# Patient Record
Sex: MALE | HISPANIC OR LATINO | Employment: PART TIME | URBAN - METROPOLITAN AREA
[De-identification: names, ages, dates, MRNs, and addresses within clinical notes are randomized per-mention and may not be internally consistent; named-entity substitution may affect disease eponyms.]

---

## 2017-06-27 ENCOUNTER — HOSPITAL ENCOUNTER (OUTPATIENT)
Dept: LAB | Facility: MEDICAL CENTER | Age: 77
End: 2017-06-27
Attending: FAMILY MEDICINE
Payer: COMMERCIAL

## 2017-06-27 LAB
ALBUMIN SERPL BCP-MCNC: 4.1 G/DL (ref 3.2–4.9)
ALBUMIN/GLOB SERPL: 1.3 G/DL
ALP SERPL-CCNC: 80 U/L (ref 30–99)
ALT SERPL-CCNC: 12 U/L (ref 2–50)
ANION GAP SERPL CALC-SCNC: 8 MMOL/L (ref 0–11.9)
AST SERPL-CCNC: 17 U/L (ref 12–45)
BASOPHILS # BLD AUTO: 0.6 % (ref 0–1.8)
BASOPHILS # BLD: 0.04 K/UL (ref 0–0.12)
BILIRUB SERPL-MCNC: 0.8 MG/DL (ref 0.1–1.5)
BUN SERPL-MCNC: 18 MG/DL (ref 8–22)
CALCIUM SERPL-MCNC: 8.9 MG/DL (ref 8.5–10.5)
CHLORIDE SERPL-SCNC: 100 MMOL/L (ref 96–112)
CHOLEST SERPL-MCNC: 151 MG/DL (ref 100–199)
CO2 SERPL-SCNC: 28 MMOL/L (ref 20–33)
CREAT SERPL-MCNC: 1.06 MG/DL (ref 0.5–1.4)
EOSINOPHIL # BLD AUTO: 0.19 K/UL (ref 0–0.51)
EOSINOPHIL NFR BLD: 2.8 % (ref 0–6.9)
ERYTHROCYTE [DISTWIDTH] IN BLOOD BY AUTOMATED COUNT: 45.4 FL (ref 35.9–50)
EST. AVERAGE GLUCOSE BLD GHB EST-MCNC: 105 MG/DL
GFR SERPL CREATININE-BSD FRML MDRD: >60 ML/MIN/1.73 M 2
GLOBULIN SER CALC-MCNC: 3.2 G/DL (ref 1.9–3.5)
GLUCOSE SERPL-MCNC: 95 MG/DL (ref 65–99)
HBA1C MFR BLD: 5.3 % (ref 0–5.6)
HCT VFR BLD AUTO: 46.2 % (ref 42–52)
HDLC SERPL-MCNC: 45 MG/DL
HGB BLD-MCNC: 15.8 G/DL (ref 14–18)
IMM GRANULOCYTES # BLD AUTO: 0.02 K/UL (ref 0–0.11)
IMM GRANULOCYTES NFR BLD AUTO: 0.3 % (ref 0–0.9)
LDLC SERPL CALC-MCNC: 90 MG/DL
LYMPHOCYTES # BLD AUTO: 2.71 K/UL (ref 1–4.8)
LYMPHOCYTES NFR BLD: 39.6 % (ref 22–41)
MCH RBC QN AUTO: 30.9 PG (ref 27–33)
MCHC RBC AUTO-ENTMCNC: 34.2 G/DL (ref 33.7–35.3)
MCV RBC AUTO: 90.2 FL (ref 81.4–97.8)
MONOCYTES # BLD AUTO: 0.59 K/UL (ref 0–0.85)
MONOCYTES NFR BLD AUTO: 8.6 % (ref 0–13.4)
NEUTROPHILS # BLD AUTO: 3.3 K/UL (ref 1.82–7.42)
NEUTROPHILS NFR BLD: 48.1 % (ref 44–72)
NRBC # BLD AUTO: 0 K/UL
NRBC BLD AUTO-RTO: 0 /100 WBC
PLATELET # BLD AUTO: 230 K/UL (ref 164–446)
PMV BLD AUTO: 11.8 FL (ref 9–12.9)
POTASSIUM SERPL-SCNC: 3.4 MMOL/L (ref 3.6–5.5)
PROT SERPL-MCNC: 7.3 G/DL (ref 6–8.2)
PSA SERPL-MCNC: 1.53 NG/ML (ref 0–4)
RBC # BLD AUTO: 5.12 M/UL (ref 4.7–6.1)
SODIUM SERPL-SCNC: 136 MMOL/L (ref 135–145)
T3FREE SERPL-MCNC: 2.7 PG/ML (ref 2.4–4.2)
T4 FREE SERPL-MCNC: 1.11 NG/DL (ref 0.53–1.43)
TRIGL SERPL-MCNC: 81 MG/DL (ref 0–149)
TSH SERPL DL<=0.005 MIU/L-ACNC: 2.26 UIU/ML (ref 0.3–3.7)
WBC # BLD AUTO: 6.9 K/UL (ref 4.8–10.8)

## 2017-06-27 PROCEDURE — 83036 HEMOGLOBIN GLYCOSYLATED A1C: CPT | Mod: GA

## 2017-06-27 PROCEDURE — 84439 ASSAY OF FREE THYROXINE: CPT

## 2017-06-27 PROCEDURE — 85025 COMPLETE CBC W/AUTO DIFF WBC: CPT

## 2017-06-27 PROCEDURE — 80061 LIPID PANEL: CPT

## 2017-06-27 PROCEDURE — 84481 FREE ASSAY (FT-3): CPT

## 2017-06-27 PROCEDURE — 84443 ASSAY THYROID STIM HORMONE: CPT

## 2017-06-27 PROCEDURE — 36415 COLL VENOUS BLD VENIPUNCTURE: CPT

## 2017-06-27 PROCEDURE — 84153 ASSAY OF PSA TOTAL: CPT | Mod: GA

## 2017-06-27 PROCEDURE — 80053 COMPREHEN METABOLIC PANEL: CPT

## 2018-04-30 ENCOUNTER — HOSPITAL ENCOUNTER (OUTPATIENT)
Dept: LAB | Facility: MEDICAL CENTER | Age: 78
End: 2018-04-30
Attending: FAMILY MEDICINE
Payer: COMMERCIAL

## 2018-04-30 LAB
ALBUMIN SERPL BCP-MCNC: 4 G/DL (ref 3.2–4.9)
ALBUMIN/GLOB SERPL: 1.1 G/DL
ALP SERPL-CCNC: 68 U/L (ref 30–99)
ALT SERPL-CCNC: 13 U/L (ref 2–50)
ANION GAP SERPL CALC-SCNC: 12 MMOL/L (ref 0–11.9)
APPEARANCE UR: CLEAR
AST SERPL-CCNC: 20 U/L (ref 12–45)
BASOPHILS # BLD AUTO: 0.8 % (ref 0–1.8)
BASOPHILS # BLD: 0.06 K/UL (ref 0–0.12)
BILIRUB SERPL-MCNC: 0.8 MG/DL (ref 0.1–1.5)
BILIRUB UR QL STRIP.AUTO: NEGATIVE
BNP SERPL-MCNC: 32 PG/ML (ref 0–100)
BUN SERPL-MCNC: 15 MG/DL (ref 8–22)
CALCIUM SERPL-MCNC: 9.1 MG/DL (ref 8.5–10.5)
CHLORIDE SERPL-SCNC: 95 MMOL/L (ref 96–112)
CHOLEST SERPL-MCNC: 151 MG/DL (ref 100–199)
CO2 SERPL-SCNC: 30 MMOL/L (ref 20–33)
COLOR UR: YELLOW
CREAT SERPL-MCNC: 0.88 MG/DL (ref 0.5–1.4)
EOSINOPHIL # BLD AUTO: 0.24 K/UL (ref 0–0.51)
EOSINOPHIL NFR BLD: 3.4 % (ref 0–6.9)
ERYTHROCYTE [DISTWIDTH] IN BLOOD BY AUTOMATED COUNT: 48.2 FL (ref 35.9–50)
EST. AVERAGE GLUCOSE BLD GHB EST-MCNC: 120 MG/DL
GLOBULIN SER CALC-MCNC: 3.6 G/DL (ref 1.9–3.5)
GLUCOSE SERPL-MCNC: 98 MG/DL (ref 65–99)
GLUCOSE UR STRIP.AUTO-MCNC: NEGATIVE MG/DL
HBA1C MFR BLD: 5.8 % (ref 0–5.6)
HCT VFR BLD AUTO: 48.1 % (ref 42–52)
HDLC SERPL-MCNC: 42 MG/DL
HGB BLD-MCNC: 16 G/DL (ref 14–18)
IMM GRANULOCYTES # BLD AUTO: 0.02 K/UL (ref 0–0.11)
IMM GRANULOCYTES NFR BLD AUTO: 0.3 % (ref 0–0.9)
KETONES UR STRIP.AUTO-MCNC: NEGATIVE MG/DL
LDLC SERPL CALC-MCNC: 87 MG/DL
LEUKOCYTE ESTERASE UR QL STRIP.AUTO: NEGATIVE
LYMPHOCYTES # BLD AUTO: 2.61 K/UL (ref 1–4.8)
LYMPHOCYTES NFR BLD: 36.7 % (ref 22–41)
MCH RBC QN AUTO: 30.2 PG (ref 27–33)
MCHC RBC AUTO-ENTMCNC: 33.3 G/DL (ref 33.7–35.3)
MCV RBC AUTO: 90.8 FL (ref 81.4–97.8)
MICRO URNS: NORMAL
MONOCYTES # BLD AUTO: 0.57 K/UL (ref 0–0.85)
MONOCYTES NFR BLD AUTO: 8 % (ref 0–13.4)
NEUTROPHILS # BLD AUTO: 3.62 K/UL (ref 1.82–7.42)
NEUTROPHILS NFR BLD: 50.8 % (ref 44–72)
NITRITE UR QL STRIP.AUTO: NEGATIVE
NRBC # BLD AUTO: 0 K/UL
NRBC BLD-RTO: 0 /100 WBC
PH UR STRIP.AUTO: 7.5 [PH]
PLATELET # BLD AUTO: 259 K/UL (ref 164–446)
PMV BLD AUTO: 11.4 FL (ref 9–12.9)
POTASSIUM SERPL-SCNC: 3.1 MMOL/L (ref 3.6–5.5)
PROT SERPL-MCNC: 7.6 G/DL (ref 6–8.2)
PROT UR QL STRIP: NEGATIVE MG/DL
PSA SERPL-MCNC: 1.89 NG/ML (ref 0–4)
RBC # BLD AUTO: 5.3 M/UL (ref 4.7–6.1)
RBC UR QL AUTO: NEGATIVE
SODIUM SERPL-SCNC: 137 MMOL/L (ref 135–145)
SP GR UR STRIP.AUTO: 1.01
TRIGL SERPL-MCNC: 108 MG/DL (ref 0–149)
UROBILINOGEN UR STRIP.AUTO-MCNC: 0.2 MG/DL
WBC # BLD AUTO: 7.1 K/UL (ref 4.8–10.8)

## 2018-04-30 PROCEDURE — 80053 COMPREHEN METABOLIC PANEL: CPT

## 2018-04-30 PROCEDURE — 85025 COMPLETE CBC W/AUTO DIFF WBC: CPT

## 2018-04-30 PROCEDURE — 80061 LIPID PANEL: CPT

## 2018-04-30 PROCEDURE — 84153 ASSAY OF PSA TOTAL: CPT | Mod: GA

## 2018-04-30 PROCEDURE — 83880 ASSAY OF NATRIURETIC PEPTIDE: CPT | Mod: GA

## 2018-04-30 PROCEDURE — 36415 COLL VENOUS BLD VENIPUNCTURE: CPT | Mod: GA

## 2018-04-30 PROCEDURE — 83036 HEMOGLOBIN GLYCOSYLATED A1C: CPT | Mod: GA

## 2018-04-30 PROCEDURE — 81003 URINALYSIS AUTO W/O SCOPE: CPT

## 2018-11-29 ENCOUNTER — HOSPITAL ENCOUNTER (OUTPATIENT)
Dept: LAB | Facility: MEDICAL CENTER | Age: 78
End: 2018-11-29
Attending: FAMILY MEDICINE
Payer: MEDICARE

## 2018-11-29 LAB
ALBUMIN SERPL BCP-MCNC: 4.1 G/DL (ref 3.2–4.9)
ALBUMIN/GLOB SERPL: 1.2 G/DL
ALP SERPL-CCNC: 80 U/L (ref 30–99)
ALT SERPL-CCNC: 17 U/L (ref 2–50)
ANION GAP SERPL CALC-SCNC: 10 MMOL/L (ref 0–11.9)
AST SERPL-CCNC: 22 U/L (ref 12–45)
BILIRUB SERPL-MCNC: 0.6 MG/DL (ref 0.1–1.5)
BUN SERPL-MCNC: 16 MG/DL (ref 8–22)
CALCIUM SERPL-MCNC: 9.2 MG/DL (ref 8.5–10.5)
CHLORIDE SERPL-SCNC: 99 MMOL/L (ref 96–112)
CO2 SERPL-SCNC: 27 MMOL/L (ref 20–33)
CREAT SERPL-MCNC: 1.02 MG/DL (ref 0.5–1.4)
EST. AVERAGE GLUCOSE BLD GHB EST-MCNC: 117 MG/DL
GLOBULIN SER CALC-MCNC: 3.5 G/DL (ref 1.9–3.5)
GLUCOSE SERPL-MCNC: 97 MG/DL (ref 65–99)
HBA1C MFR BLD: 5.7 % (ref 0–5.6)
POTASSIUM SERPL-SCNC: 3.1 MMOL/L (ref 3.6–5.5)
PROT SERPL-MCNC: 7.6 G/DL (ref 6–8.2)
SODIUM SERPL-SCNC: 136 MMOL/L (ref 135–145)

## 2018-11-29 PROCEDURE — 36415 COLL VENOUS BLD VENIPUNCTURE: CPT

## 2018-11-29 PROCEDURE — 80053 COMPREHEN METABOLIC PANEL: CPT

## 2018-11-29 PROCEDURE — 83036 HEMOGLOBIN GLYCOSYLATED A1C: CPT | Mod: GA

## 2021-01-14 DIAGNOSIS — Z23 NEED FOR VACCINATION: ICD-10-CM

## 2021-03-31 ENCOUNTER — IMMUNIZATION (OUTPATIENT)
Dept: FAMILY PLANNING/WOMEN'S HEALTH CLINIC | Facility: IMMUNIZATION CENTER | Age: 81
End: 2021-03-31
Attending: INTERNAL MEDICINE
Payer: MEDICARE

## 2021-03-31 DIAGNOSIS — Z23 ENCOUNTER FOR VACCINATION: Primary | ICD-10-CM

## 2021-03-31 DIAGNOSIS — Z23 NEED FOR VACCINATION: ICD-10-CM

## 2021-03-31 PROCEDURE — 0001A PFIZER SARS-COV-2 VACCINE: CPT | Performed by: INTERNAL MEDICINE

## 2021-03-31 PROCEDURE — 91300 PFIZER SARS-COV-2 VACCINE: CPT | Performed by: INTERNAL MEDICINE

## 2021-04-22 ENCOUNTER — PATIENT OUTREACH (OUTPATIENT)
Dept: SCHEDULING | Facility: IMAGING CENTER | Age: 81
End: 2021-04-22

## 2021-04-22 ENCOUNTER — IMMUNIZATION (OUTPATIENT)
Dept: FAMILY PLANNING/WOMEN'S HEALTH CLINIC | Facility: IMMUNIZATION CENTER | Age: 81
End: 2021-04-22
Payer: MEDICARE

## 2021-04-22 DIAGNOSIS — Z23 ENCOUNTER FOR VACCINATION: Primary | ICD-10-CM

## 2021-04-22 PROCEDURE — 91300 PFIZER SARS-COV-2 VACCINE: CPT | Performed by: INTERNAL MEDICINE

## 2021-04-22 PROCEDURE — 0002A PFIZER SARS-COV-2 VACCINE: CPT | Performed by: INTERNAL MEDICINE

## 2021-04-22 NOTE — PROGRESS NOTES
Attempt #1    Outreach for COVID vaccine    [facility] already had 2nd dose    Transfer to [phone] if patient calls back to schedule appointment.

## 2022-10-18 ENCOUNTER — PHARMACY VISIT (OUTPATIENT)
Dept: PHARMACY | Facility: MEDICAL CENTER | Age: 82
End: 2022-10-18
Payer: COMMERCIAL

## 2022-10-18 PROCEDURE — RXMED WILLOW AMBULATORY MEDICATION CHARGE: Performed by: INTERNAL MEDICINE

## 2022-10-18 RX ORDER — BNT162B2 ORIGINAL AND OMICRON BA.4/BA.5 .1125; .1125 MG/2.25ML; MG/2.25ML
0.3 INJECTION, SUSPENSION INTRAMUSCULAR
Qty: 0.3 ML | Refills: 0 | OUTPATIENT
Start: 2022-10-18 | End: 2022-10-19

## 2023-11-10 ENCOUNTER — APPOINTMENT (OUTPATIENT)
Dept: RADIOLOGY | Facility: MEDICAL CENTER | Age: 83
End: 2023-11-10
Attending: EMERGENCY MEDICINE
Payer: MEDICARE

## 2023-11-10 ENCOUNTER — HOSPITAL ENCOUNTER (EMERGENCY)
Facility: MEDICAL CENTER | Age: 83
End: 2023-11-10
Attending: EMERGENCY MEDICINE
Payer: MEDICARE

## 2023-11-10 VITALS
OXYGEN SATURATION: 95 % | BODY MASS INDEX: 26.98 KG/M2 | TEMPERATURE: 98.6 F | SYSTOLIC BLOOD PRESSURE: 168 MMHG | HEART RATE: 58 BPM | HEIGHT: 68 IN | WEIGHT: 178 LBS | RESPIRATION RATE: 16 BRPM | DIASTOLIC BLOOD PRESSURE: 68 MMHG
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
## 2023-11-10 DIAGNOSIS — M79.605 PAIN OF LEFT LOWER EXTREMITY: ICD-10-CM

## 2023-11-10 LAB
ALBUMIN SERPL BCP-MCNC: 4.3 G/DL (ref 3.2–4.9)
ALBUMIN/GLOB SERPL: 1.1 G/DL
ALP SERPL-CCNC: 75 U/L (ref 30–99)
ALT SERPL-CCNC: 24 U/L (ref 2–50)
ANION GAP SERPL CALC-SCNC: 13 MMOL/L (ref 7–16)
AST SERPL-CCNC: 24 U/L (ref 12–45)
BASOPHILS # BLD AUTO: 0.5 % (ref 0–1.8)
BASOPHILS # BLD: 0.05 K/UL (ref 0–0.12)
BILIRUB SERPL-MCNC: 0.7 MG/DL (ref 0.1–1.5)
BUN SERPL-MCNC: 13 MG/DL (ref 8–22)
CALCIUM ALBUM COR SERPL-MCNC: 9.2 MG/DL (ref 8.5–10.5)
CALCIUM SERPL-MCNC: 9.4 MG/DL (ref 8.5–10.5)
CHLORIDE SERPL-SCNC: 100 MMOL/L (ref 96–112)
CO2 SERPL-SCNC: 23 MMOL/L (ref 20–33)
CREAT SERPL-MCNC: 0.88 MG/DL (ref 0.5–1.4)
EOSINOPHIL # BLD AUTO: 0.13 K/UL (ref 0–0.51)
EOSINOPHIL NFR BLD: 1.4 % (ref 0–6.9)
ERYTHROCYTE [DISTWIDTH] IN BLOOD BY AUTOMATED COUNT: 49.1 FL (ref 35.9–50)
GFR SERPLBLD CREATININE-BSD FMLA CKD-EPI: 84 ML/MIN/1.73 M 2
GLOBULIN SER CALC-MCNC: 3.8 G/DL (ref 1.9–3.5)
GLUCOSE SERPL-MCNC: 92 MG/DL (ref 65–99)
HCT VFR BLD AUTO: 50.1 % (ref 42–52)
HGB BLD-MCNC: 17.1 G/DL (ref 14–18)
IMM GRANULOCYTES # BLD AUTO: 0.04 K/UL (ref 0–0.11)
IMM GRANULOCYTES NFR BLD AUTO: 0.4 % (ref 0–0.9)
LYMPHOCYTES # BLD AUTO: 2.64 K/UL (ref 1–4.8)
LYMPHOCYTES NFR BLD: 28.5 % (ref 22–41)
MCH RBC QN AUTO: 30.8 PG (ref 27–33)
MCHC RBC AUTO-ENTMCNC: 34.1 G/DL (ref 32.3–36.5)
MCV RBC AUTO: 90.3 FL (ref 81.4–97.8)
MONOCYTES # BLD AUTO: 0.66 K/UL (ref 0–0.85)
MONOCYTES NFR BLD AUTO: 7.1 % (ref 0–13.4)
NEUTROPHILS # BLD AUTO: 5.74 K/UL (ref 1.82–7.42)
NEUTROPHILS NFR BLD: 62.1 % (ref 44–72)
NRBC # BLD AUTO: 0 K/UL
NRBC BLD-RTO: 0 /100 WBC (ref 0–0.2)
PLATELET # BLD AUTO: 251 K/UL (ref 164–446)
PMV BLD AUTO: 10.6 FL (ref 9–12.9)
POTASSIUM SERPL-SCNC: 3.8 MMOL/L (ref 3.6–5.5)
PROT SERPL-MCNC: 8.1 G/DL (ref 6–8.2)
RBC # BLD AUTO: 5.55 M/UL (ref 4.7–6.1)
SODIUM SERPL-SCNC: 136 MMOL/L (ref 135–145)
WBC # BLD AUTO: 9.3 K/UL (ref 4.8–10.8)

## 2023-11-10 PROCEDURE — 96374 THER/PROPH/DIAG INJ IV PUSH: CPT

## 2023-11-10 PROCEDURE — 36415 COLL VENOUS BLD VENIPUNCTURE: CPT

## 2023-11-10 PROCEDURE — 700111 HCHG RX REV CODE 636 W/ 250 OVERRIDE (IP): Mod: JZ | Performed by: EMERGENCY MEDICINE

## 2023-11-10 PROCEDURE — 80053 COMPREHEN METABOLIC PANEL: CPT

## 2023-11-10 PROCEDURE — 85025 COMPLETE CBC W/AUTO DIFF WBC: CPT

## 2023-11-10 PROCEDURE — 96375 TX/PRO/DX INJ NEW DRUG ADDON: CPT

## 2023-11-10 PROCEDURE — 73552 X-RAY EXAM OF FEMUR 2/>: CPT | Mod: LT

## 2023-11-10 PROCEDURE — 94760 N-INVAS EAR/PLS OXIMETRY 1: CPT

## 2023-11-10 PROCEDURE — 99285 EMERGENCY DEPT VISIT HI MDM: CPT

## 2023-11-10 PROCEDURE — 72170 X-RAY EXAM OF PELVIS: CPT

## 2023-11-10 RX ORDER — ONDANSETRON 2 MG/ML
4 INJECTION INTRAMUSCULAR; INTRAVENOUS ONCE
Status: COMPLETED | OUTPATIENT
Start: 2023-11-10 | End: 2023-11-10

## 2023-11-10 RX ORDER — HYDROMORPHONE HYDROCHLORIDE 1 MG/ML
0.5 INJECTION, SOLUTION INTRAMUSCULAR; INTRAVENOUS; SUBCUTANEOUS
Status: DISCONTINUED | OUTPATIENT
Start: 2023-11-10 | End: 2023-11-10 | Stop reason: HOSPADM

## 2023-11-10 RX ADMIN — ONDANSETRON 4 MG: 2 INJECTION INTRAMUSCULAR; INTRAVENOUS at 14:25

## 2023-11-10 RX ADMIN — HYDROMORPHONE HYDROCHLORIDE 0.5 MG: 1 INJECTION, SOLUTION INTRAMUSCULAR; INTRAVENOUS; SUBCUTANEOUS at 14:24

## 2023-11-10 NOTE — ED PROVIDER NOTES
"ER Provider Note    Scribed for Joseluis Norton M.D. by Femi Avilez. 11/10/2023   1:53 PM    Primary Care Provider: Susi Gregorio M.D.    CHIEF COMPLAINT  Chief Complaint   Patient presents with    Leg Pain     The pt fell off of the 3rd step and landed on left leg. The pt reports left leg pain right above the knee. The pt denies head injury or any other trauma. No loc, no thinners.    Fall     EXTERNAL RECORDS REVIEWED  Other No recent pertinent records available for review.    HPI/ROS  LIMITATION TO HISTORY   Select: : None    OUTSIDE HISTORIAN(S):  None    Victoriano Vasquez is a 83 y.o. male who presents to the ED via EMS for evaluation of left leg pain after a ground level fall, onset prior to arrival. The patient states he was walking on the stairs in his house when he fell off the third step and landed on his left leg. He notes the pain is most significant right above the knee. He denies any loss of consciousness, head injury, chest pain, or shortness of breath. There are no known alleviating factors. He is not currently on blood thinners and reports no major past medical history.    PAST MEDICAL HISTORY  History reviewed. No pertinent past medical history.    SURGICAL HISTORY  History reviewed. No pertinent surgical history.    FAMILY HISTORY  History reviewed. No pertinent family history.    SOCIAL HISTORY   reports that he has never smoked. He has never used smokeless tobacco. He reports that he does not drink alcohol and does not use drugs.    CURRENT MEDICATIONS  Previous Medications    NON SPECIFIED    Medication for high cholersterol        ALLERGIES  No Known Allergies     PHYSICAL EXAM  BP (!) 180/81   Pulse 71   Resp 18   Ht 1.727 m (5' 8\")   Wt 80.7 kg (178 lb)   SpO2 98%   BMI 27.06 kg/m²    Constitutional: Well developed, Well nourished, Moderate distress,    HENT: Normocephalic, Atraumatic   Eyes: PERRLA, EOMI, Conjunctiva normal, No discharge.   Neck: No C-spine tenderness, Supple, " No stridor.   Cardiovascular: Normal heart rate, Normal rhythm.   Thorax & Lungs: Clear to auscultation bilaterally, No respiratory distress, No wheezing, No crackles.   Abdomen: Soft, No tenderness, No masses, No pulsatile masses.   Skin: Warm, Dry, No erythema, No rash.    Musculoskeletal: Tenderness around the left mid femur without any obvious deformity. No major deformities noted.  Intact distal pulses  Neurologic: Awake, alert. Moves all extremities spontaneously.  Psychiatric: Affect normal, Judgment normal, Mood normal.       DIAGNOSTIC STUDIES    Labs:   Results for orders placed or performed during the hospital encounter of 11/10/23   CBC w/ Differential   Result Value Ref Range    WBC 9.3 4.8 - 10.8 K/uL    RBC 5.55 4.70 - 6.10 M/uL    Hemoglobin 17.1 14.0 - 18.0 g/dL    Hematocrit 50.1 42.0 - 52.0 %    MCV 90.3 81.4 - 97.8 fL    MCH 30.8 27.0 - 33.0 pg    MCHC 34.1 32.3 - 36.5 g/dL    RDW 49.1 35.9 - 50.0 fL    Platelet Count 251 164 - 446 K/uL    MPV 10.6 9.0 - 12.9 fL    Neutrophils-Polys 62.10 44.00 - 72.00 %    Lymphocytes 28.50 22.00 - 41.00 %    Monocytes 7.10 0.00 - 13.40 %    Eosinophils 1.40 0.00 - 6.90 %    Basophils 0.50 0.00 - 1.80 %    Immature Granulocytes 0.40 0.00 - 0.90 %    Nucleated RBC 0.00 0.00 - 0.20 /100 WBC    Neutrophils (Absolute) 5.74 1.82 - 7.42 K/uL    Lymphs (Absolute) 2.64 1.00 - 4.80 K/uL    Monos (Absolute) 0.66 0.00 - 0.85 K/uL    Eos (Absolute) 0.13 0.00 - 0.51 K/uL    Baso (Absolute) 0.05 0.00 - 0.12 K/uL    Immature Granulocytes (abs) 0.04 0.00 - 0.11 K/uL    NRBC (Absolute) 0.00 K/uL   Complete Metabolic Panel (CMP)   Result Value Ref Range    Sodium 136 135 - 145 mmol/L    Potassium 3.8 3.6 - 5.5 mmol/L    Chloride 100 96 - 112 mmol/L    Co2 23 20 - 33 mmol/L    Anion Gap 13.0 7.0 - 16.0    Glucose 92 65 - 99 mg/dL    Bun 13 8 - 22 mg/dL    Creatinine 0.88 0.50 - 1.40 mg/dL    Calcium 9.4 8.5 - 10.5 mg/dL    Correct Calcium 9.2 8.5 - 10.5 mg/dL    AST(SGOT) 24 12 -  45 U/L    ALT(SGPT) 24 2 - 50 U/L    Alkaline Phosphatase 75 30 - 99 U/L    Total Bilirubin 0.7 0.1 - 1.5 mg/dL    Albumin 4.3 3.2 - 4.9 g/dL    Total Protein 8.1 6.0 - 8.2 g/dL    Globulin 3.8 (H) 1.9 - 3.5 g/dL    A-G Ratio 1.1 g/dL   ESTIMATED GFR   Result Value Ref Range    GFR (CKD-EPI) 84 >60 mL/min/1.73 m 2       Radiology:   This attending emergency physician has independently interpreted the diagnostic imaging associated with this visit and is awaiting the final reading from the radiologist.   Preliminary interpretation is a follows: No fracture  Radiologist interpretation:   DX-FEMUR-2+ LEFT   Final Result      Negative femur series.      DX-PELVIS-1 OR 2 VIEWS   Final Result      1.  Unremarkable AP view of the pelvis.             COURSE & MEDICAL DECISION MAKING     ED Observation Status?  No  INITIAL ASSESSMENT, COURSE AND PLAN    Care Narrative: Status post fall, leg pain, x-rays negative, patient was able to ambulate with a walker.  We will give the patient a walker, will have the patient follow-up with orthopedics, have the patient return with worsening symptoms.    1:53 PM - Patient was evaluated at bedside. Ordered for DX-Pelvis, DX-Femur 2+ left, CBC with differential, and CMP to evaluate. The patient will be medicated with Dilaudid injection 0.5 mg and Zofran injection 4 mg for his pain and nausea. Patient verbalizes understanding and support with my plan of care.         DISPOSITION AND DISCUSSIONS      Discussion of management with other [facility] or appropriate source(s): Social Work        Barriers to care at this time, including but not limited to:  None known at this time .     Decision tools and prescription drugs considered including, but not limited to: Pain Medications   .     The patient will return for new or worsening symptoms and is stable at the time of discharge.    The patient is referred to a primary physician for blood pressure management, diabetic screening, and for all other  preventative health concerns.        DISPOSITION:  Patient will be discharged home in stable condition.    FOLLOW UP:  [facility], Emergency Dept  [address] [facility] [address] [phone]    If symptoms worsen    Ramon Ma M.D.  [address] [place] [id number] [phone]            OUTPATIENT MEDICATIONS:  New Prescriptions    No medications on file         FINAL DIAGNOSIS  1. Fall, initial encounter    2. Pain of left lower extremity         IFemi (Scribe), am scribing for, and in the presence of, Joseluis Norton M.D..    Electronically signed by: Femi Avilez (Lennieibmarvel), 11/10/2023    IJoseluis M.D. personally performed the services described in this documentation, as scribed by Femi Avilez in my presence, and it is both accurate and complete.      The note accurately reflects work and decisions made by me.  Joseluis Norton M.D.  11/10/2023  4:31 PM

## 2023-11-10 NOTE — ED NOTES
The pt is alert and oriented. Vitals stable on the monitor. The pt reports pain, RN to medicate per mar.

## 2023-11-10 NOTE — ED NOTES
The pt is alert and oriented sitting in bed. The pt reports a pain reduction after medicating per mar. Vitals stable on the monitor. The pt ambulates with a walker down the henry with RN at standby assist. ERP notified

## 2023-11-10 NOTE — ED NOTES
The pt is alert and oriented sitting in bed. The pt reports leg pain. RN hooked up the pt on the monitor, vitals stable on the monitor.

## 2023-11-10 NOTE — ED TRIAGE NOTES
"Chief Complaint   Patient presents with    Leg Pain     The pt fell off of the 3rd step and landed on left leg. The pt reports left leg pain right above the knee. The pt denies head injury or any other trauma. No loc, no thinners.    Fall       BIB EMS to 16, pt on monitor and in gown, labs drawn and sent. Pt consists of: for the above complaint.    Medications given en route: nitrous oxide    BP (!) 180/81   Pulse 71   Resp 18   Ht 1.727 m (5' 8\")   Wt 80.7 kg (178 lb)   SpO2 98%   BMI 27.06 kg/m²     "

## 2024-04-26 ENCOUNTER — APPOINTMENT (OUTPATIENT)
Dept: RADIOLOGY | Facility: MEDICAL CENTER | Age: 84
DRG: 419 | End: 2024-04-26
Attending: EMERGENCY MEDICINE
Payer: MEDICARE

## 2024-04-26 ENCOUNTER — APPOINTMENT (OUTPATIENT)
Dept: RADIOLOGY | Facility: MEDICAL CENTER | Age: 84
DRG: 419 | End: 2024-04-26
Attending: STUDENT IN AN ORGANIZED HEALTH CARE EDUCATION/TRAINING PROGRAM
Payer: MEDICARE

## 2024-04-26 ENCOUNTER — HOSPITAL ENCOUNTER (INPATIENT)
Facility: MEDICAL CENTER | Age: 84
LOS: 4 days | DRG: 419 | End: 2024-04-30
Attending: EMERGENCY MEDICINE | Admitting: STUDENT IN AN ORGANIZED HEALTH CARE EDUCATION/TRAINING PROGRAM
Payer: MEDICARE

## 2024-04-26 DIAGNOSIS — I48.0 PAROXYSMAL ATRIAL FIBRILLATION (HCC): ICD-10-CM

## 2024-04-26 DIAGNOSIS — I35.0 AORTIC STENOSIS, MODERATE: ICD-10-CM

## 2024-04-26 DIAGNOSIS — K81.0 ACUTE CHOLECYSTITIS: ICD-10-CM

## 2024-04-26 DIAGNOSIS — R10.13 EPIGASTRIC PAIN: ICD-10-CM

## 2024-04-26 DIAGNOSIS — K83.8 DILATION OF BILIARY TRACT: ICD-10-CM

## 2024-04-26 DIAGNOSIS — I10 PRIMARY HYPERTENSION: ICD-10-CM

## 2024-04-26 DIAGNOSIS — E78.5 HYPERLIPIDEMIA, UNSPECIFIED HYPERLIPIDEMIA TYPE: ICD-10-CM

## 2024-04-26 PROBLEM — E87.6 HYPOKALEMIA: Status: ACTIVE | Noted: 2024-04-26

## 2024-04-26 PROBLEM — R74.8 ELEVATED LIVER ENZYMES: Status: ACTIVE | Noted: 2024-04-26

## 2024-04-26 PROBLEM — E03.9 HYPOTHYROIDISM: Status: ACTIVE | Noted: 2024-04-26

## 2024-04-26 PROBLEM — Z71.89 ACP (ADVANCE CARE PLANNING): Status: ACTIVE | Noted: 2024-04-26

## 2024-04-26 PROBLEM — I48.91 AF (ATRIAL FIBRILLATION) (HCC): Status: ACTIVE | Noted: 2024-04-26

## 2024-04-26 PROBLEM — R10.9 ABDOMINAL PAIN: Status: ACTIVE | Noted: 2024-04-26

## 2024-04-26 LAB
ALBUMIN SERPL BCP-MCNC: 4.2 G/DL (ref 3.2–4.9)
ALBUMIN/GLOB SERPL: 1.1 G/DL
ALP SERPL-CCNC: 121 U/L (ref 30–99)
ALT SERPL-CCNC: 49 U/L (ref 2–50)
ANION GAP SERPL CALC-SCNC: 14 MMOL/L (ref 7–16)
APPEARANCE UR: CLEAR
AST SERPL-CCNC: 93 U/L (ref 12–45)
BACTERIA #/AREA URNS HPF: NEGATIVE /HPF
BASOPHILS # BLD AUTO: 0.2 % (ref 0–1.8)
BASOPHILS # BLD: 0.02 K/UL (ref 0–0.12)
BILIRUB SERPL-MCNC: 1.3 MG/DL (ref 0.1–1.5)
BILIRUB UR QL STRIP.AUTO: NEGATIVE
BUN SERPL-MCNC: 19 MG/DL (ref 8–22)
CALCIUM ALBUM COR SERPL-MCNC: 9.2 MG/DL (ref 8.5–10.5)
CALCIUM SERPL-MCNC: 9.4 MG/DL (ref 8.5–10.5)
CHLORIDE SERPL-SCNC: 103 MMOL/L (ref 96–112)
CO2 SERPL-SCNC: 20 MMOL/L (ref 20–33)
COLOR UR: YELLOW
CREAT SERPL-MCNC: 0.83 MG/DL (ref 0.5–1.4)
EKG IMPRESSION: NORMAL
EOSINOPHIL # BLD AUTO: 0.07 K/UL (ref 0–0.51)
EOSINOPHIL NFR BLD: 0.8 % (ref 0–6.9)
EPI CELLS #/AREA URNS HPF: NEGATIVE /HPF
ERYTHROCYTE [DISTWIDTH] IN BLOOD BY AUTOMATED COUNT: 53.7 FL (ref 35.9–50)
GFR SERPLBLD CREATININE-BSD FMLA CKD-EPI: 85 ML/MIN/1.73 M 2
GLOBULIN SER CALC-MCNC: 3.7 G/DL (ref 1.9–3.5)
GLUCOSE SERPL-MCNC: 114 MG/DL (ref 65–99)
GLUCOSE UR STRIP.AUTO-MCNC: NEGATIVE MG/DL
HCT VFR BLD AUTO: 49.1 % (ref 42–52)
HGB BLD-MCNC: 16.4 G/DL (ref 14–18)
HYALINE CASTS #/AREA URNS LPF: ABNORMAL /LPF
IMM GRANULOCYTES # BLD AUTO: 0.02 K/UL (ref 0–0.11)
IMM GRANULOCYTES NFR BLD AUTO: 0.2 % (ref 0–0.9)
KETONES UR STRIP.AUTO-MCNC: 15 MG/DL
LEUKOCYTE ESTERASE UR QL STRIP.AUTO: NEGATIVE
LIPASE SERPL-CCNC: 52 U/L (ref 11–82)
LYMPHOCYTES # BLD AUTO: 2.65 K/UL (ref 1–4.8)
LYMPHOCYTES NFR BLD: 30.3 % (ref 22–41)
MCH RBC QN AUTO: 30.4 PG (ref 27–33)
MCHC RBC AUTO-ENTMCNC: 33.4 G/DL (ref 32.3–36.5)
MCV RBC AUTO: 91.1 FL (ref 81.4–97.8)
MICRO URNS: ABNORMAL
MONOCYTES # BLD AUTO: 0.42 K/UL (ref 0–0.85)
MONOCYTES NFR BLD AUTO: 4.8 % (ref 0–13.4)
NEUTROPHILS # BLD AUTO: 5.58 K/UL (ref 1.82–7.42)
NEUTROPHILS NFR BLD: 63.7 % (ref 44–72)
NITRITE UR QL STRIP.AUTO: NEGATIVE
NRBC # BLD AUTO: 0 K/UL
NRBC BLD-RTO: 0 /100 WBC (ref 0–0.2)
PH UR STRIP.AUTO: 6 [PH] (ref 5–8)
PLATELET # BLD AUTO: 259 K/UL (ref 164–446)
PMV BLD AUTO: 10.8 FL (ref 9–12.9)
POTASSIUM SERPL-SCNC: 3.5 MMOL/L (ref 3.6–5.5)
PROT SERPL-MCNC: 7.9 G/DL (ref 6–8.2)
PROT UR QL STRIP: NEGATIVE MG/DL
RBC # BLD AUTO: 5.39 M/UL (ref 4.7–6.1)
RBC # URNS HPF: ABNORMAL /HPF
RBC UR QL AUTO: ABNORMAL
SODIUM SERPL-SCNC: 137 MMOL/L (ref 135–145)
SP GR UR STRIP.AUTO: 1.02
TROPONIN T SERPL-MCNC: 21 NG/L (ref 6–19)
TROPONIN T SERPL-MCNC: 25 NG/L (ref 6–19)
UROBILINOGEN UR STRIP.AUTO-MCNC: 1 MG/DL
WBC # BLD AUTO: 8.8 K/UL (ref 4.8–10.8)
WBC #/AREA URNS HPF: ABNORMAL /HPF

## 2024-04-26 PROCEDURE — 81001 URINALYSIS AUTO W/SCOPE: CPT

## 2024-04-26 PROCEDURE — 84484 ASSAY OF TROPONIN QUANT: CPT | Mod: 91

## 2024-04-26 PROCEDURE — 99285 EMERGENCY DEPT VISIT HI MDM: CPT

## 2024-04-26 PROCEDURE — 85025 COMPLETE CBC W/AUTO DIFF WBC: CPT

## 2024-04-26 PROCEDURE — 700105 HCHG RX REV CODE 258: Performed by: EMERGENCY MEDICINE

## 2024-04-26 PROCEDURE — 700102 HCHG RX REV CODE 250 W/ 637 OVERRIDE(OP): Performed by: STUDENT IN AN ORGANIZED HEALTH CARE EDUCATION/TRAINING PROGRAM

## 2024-04-26 PROCEDURE — 99223 1ST HOSP IP/OBS HIGH 75: CPT | Mod: 25,AI | Performed by: STUDENT IN AN ORGANIZED HEALTH CARE EDUCATION/TRAINING PROGRAM

## 2024-04-26 PROCEDURE — 770006 HCHG ROOM/CARE - MED/SURG/GYN SEMI*

## 2024-04-26 PROCEDURE — 36415 COLL VENOUS BLD VENIPUNCTURE: CPT

## 2024-04-26 PROCEDURE — 83690 ASSAY OF LIPASE: CPT

## 2024-04-26 PROCEDURE — 96374 THER/PROPH/DIAG INJ IV PUSH: CPT

## 2024-04-26 PROCEDURE — 80053 COMPREHEN METABOLIC PANEL: CPT

## 2024-04-26 PROCEDURE — 99497 ADVNCD CARE PLAN 30 MIN: CPT | Performed by: STUDENT IN AN ORGANIZED HEALTH CARE EDUCATION/TRAINING PROGRAM

## 2024-04-26 PROCEDURE — 700111 HCHG RX REV CODE 636 W/ 250 OVERRIDE (IP): Mod: JZ,JG | Performed by: EMERGENCY MEDICINE

## 2024-04-26 PROCEDURE — 700117 HCHG RX CONTRAST REV CODE 255: Performed by: EMERGENCY MEDICINE

## 2024-04-26 PROCEDURE — 93005 ELECTROCARDIOGRAM TRACING: CPT | Performed by: EMERGENCY MEDICINE

## 2024-04-26 PROCEDURE — A9270 NON-COVERED ITEM OR SERVICE: HCPCS | Performed by: STUDENT IN AN ORGANIZED HEALTH CARE EDUCATION/TRAINING PROGRAM

## 2024-04-26 RX ORDER — LEVOTHYROXINE SODIUM 0.1 MG/1
100 TABLET ORAL
Status: DISCONTINUED | OUTPATIENT
Start: 2024-04-26 | End: 2024-04-30 | Stop reason: HOSPADM

## 2024-04-26 RX ORDER — LEVOTHYROXINE SODIUM 0.1 MG/1
100 TABLET ORAL
COMMUNITY

## 2024-04-26 RX ORDER — ACETAMINOPHEN 325 MG/1
650 TABLET ORAL EVERY 6 HOURS PRN
Status: DISCONTINUED | OUTPATIENT
Start: 2024-04-26 | End: 2024-04-30 | Stop reason: HOSPADM

## 2024-04-26 RX ORDER — ACETAMINOPHEN 10 MG/ML
1000 INJECTION, SOLUTION INTRAVENOUS ONCE
Status: COMPLETED | OUTPATIENT
Start: 2024-04-26 | End: 2024-04-26

## 2024-04-26 RX ORDER — METOPROLOL SUCCINATE 25 MG/1
25 TABLET, EXTENDED RELEASE ORAL 2 TIMES DAILY
Status: DISCONTINUED | OUTPATIENT
Start: 2024-04-26 | End: 2024-04-30 | Stop reason: HOSPADM

## 2024-04-26 RX ORDER — POTASSIUM CHLORIDE 20 MEQ/1
40 TABLET, EXTENDED RELEASE ORAL ONCE
Status: COMPLETED | OUTPATIENT
Start: 2024-04-26 | End: 2024-04-26

## 2024-04-26 RX ORDER — SODIUM CHLORIDE, SODIUM LACTATE, POTASSIUM CHLORIDE, CALCIUM CHLORIDE 600; 310; 30; 20 MG/100ML; MG/100ML; MG/100ML; MG/100ML
1000 INJECTION, SOLUTION INTRAVENOUS ONCE
Status: COMPLETED | OUTPATIENT
Start: 2024-04-26 | End: 2024-04-26

## 2024-04-26 RX ORDER — METOPROLOL SUCCINATE 25 MG/1
25 TABLET, EXTENDED RELEASE ORAL 2 TIMES DAILY
COMMUNITY

## 2024-04-26 RX ORDER — ONDANSETRON 4 MG/1
4 TABLET, ORALLY DISINTEGRATING ORAL EVERY 4 HOURS PRN
Status: DISCONTINUED | OUTPATIENT
Start: 2024-04-26 | End: 2024-04-30 | Stop reason: HOSPADM

## 2024-04-26 RX ORDER — HYDROMORPHONE HYDROCHLORIDE 1 MG/ML
0.5 INJECTION, SOLUTION INTRAMUSCULAR; INTRAVENOUS; SUBCUTANEOUS
Status: DISCONTINUED | OUTPATIENT
Start: 2024-04-26 | End: 2024-04-30 | Stop reason: HOSPADM

## 2024-04-26 RX ORDER — AMOXICILLIN 250 MG
2 CAPSULE ORAL EVERY EVENING
Status: DISCONTINUED | OUTPATIENT
Start: 2024-04-26 | End: 2024-04-30 | Stop reason: HOSPADM

## 2024-04-26 RX ORDER — OXYCODONE HYDROCHLORIDE 10 MG/1
10 TABLET ORAL
Status: DISCONTINUED | OUTPATIENT
Start: 2024-04-26 | End: 2024-04-30 | Stop reason: HOSPADM

## 2024-04-26 RX ORDER — LABETALOL HYDROCHLORIDE 5 MG/ML
10 INJECTION, SOLUTION INTRAVENOUS EVERY 4 HOURS PRN
Status: DISCONTINUED | OUTPATIENT
Start: 2024-04-26 | End: 2024-04-30 | Stop reason: HOSPADM

## 2024-04-26 RX ORDER — AMLODIPINE BESYLATE 5 MG/1
5 TABLET ORAL DAILY
Status: DISCONTINUED | OUTPATIENT
Start: 2024-04-26 | End: 2024-04-30 | Stop reason: HOSPADM

## 2024-04-26 RX ORDER — ONDANSETRON 2 MG/ML
4 INJECTION INTRAMUSCULAR; INTRAVENOUS EVERY 4 HOURS PRN
Status: DISCONTINUED | OUTPATIENT
Start: 2024-04-26 | End: 2024-04-30 | Stop reason: HOSPADM

## 2024-04-26 RX ORDER — AMLODIPINE BESYLATE 5 MG/1
5 TABLET ORAL DAILY
COMMUNITY

## 2024-04-26 RX ORDER — OXYCODONE HYDROCHLORIDE 5 MG/1
5 TABLET ORAL
Status: DISCONTINUED | OUTPATIENT
Start: 2024-04-26 | End: 2024-04-30 | Stop reason: HOSPADM

## 2024-04-26 RX ORDER — POLYETHYLENE GLYCOL 3350 17 G/17G
1 POWDER, FOR SOLUTION ORAL
Status: DISCONTINUED | OUTPATIENT
Start: 2024-04-26 | End: 2024-04-30 | Stop reason: HOSPADM

## 2024-04-26 RX ADMIN — POTASSIUM CHLORIDE 40 MEQ: 1500 TABLET, EXTENDED RELEASE ORAL at 11:40

## 2024-04-26 RX ADMIN — ACETAMINOPHEN 1000 MG: 1000 INJECTION INTRAVENOUS at 05:39

## 2024-04-26 RX ADMIN — AMLODIPINE BESYLATE 5 MG: 5 TABLET ORAL at 11:40

## 2024-04-26 RX ADMIN — METOPROLOL SUCCINATE 25 MG: 25 TABLET, FILM COATED, EXTENDED RELEASE ORAL at 11:41

## 2024-04-26 RX ADMIN — IOHEXOL 100 ML: 350 INJECTION, SOLUTION INTRAVENOUS at 05:36

## 2024-04-26 RX ADMIN — SODIUM CHLORIDE, POTASSIUM CHLORIDE, SODIUM LACTATE AND CALCIUM CHLORIDE 1000 ML: 600; 310; 30; 20 INJECTION, SOLUTION INTRAVENOUS at 05:39

## 2024-04-26 RX ADMIN — LEVOTHYROXINE SODIUM 100 MCG: 0.1 TABLET ORAL at 11:41

## 2024-04-26 ASSESSMENT — COGNITIVE AND FUNCTIONAL STATUS - GENERAL
SUGGESTED CMS G CODE MODIFIER DAILY ACTIVITY: CH
MOBILITY SCORE: 24
SUGGESTED CMS G CODE MODIFIER MOBILITY: CH
DAILY ACTIVITIY SCORE: 24

## 2024-04-26 ASSESSMENT — LIFESTYLE VARIABLES
DOES PATIENT WANT TO STOP DRINKING: NO
CONSUMPTION TOTAL: NEGATIVE
EVER FELT BAD OR GUILTY ABOUT YOUR DRINKING: NO
TOTAL SCORE: 0
TOTAL SCORE: 0
HOW MANY TIMES IN THE PAST YEAR HAVE YOU HAD 5 OR MORE DRINKS IN A DAY: 0
TOTAL SCORE: 0
HAVE PEOPLE ANNOYED YOU BY CRITICIZING YOUR DRINKING: NO
ALCOHOL_USE: NO
HAVE YOU EVER FELT YOU SHOULD CUT DOWN ON YOUR DRINKING: NO
EVER HAD A DRINK FIRST THING IN THE MORNING TO STEADY YOUR NERVES TO GET RID OF A HANGOVER: NO
ON A TYPICAL DAY WHEN YOU DRINK ALCOHOL HOW MANY DRINKS DO YOU HAVE: 0
AVERAGE NUMBER OF DAYS PER WEEK YOU HAVE A DRINK CONTAINING ALCOHOL: 0

## 2024-04-26 ASSESSMENT — PATIENT HEALTH QUESTIONNAIRE - PHQ9
2. FEELING DOWN, DEPRESSED, IRRITABLE, OR HOPELESS: NOT AT ALL
SUM OF ALL RESPONSES TO PHQ9 QUESTIONS 1 AND 2: 0
1. LITTLE INTEREST OR PLEASURE IN DOING THINGS: NOT AT ALL

## 2024-04-26 ASSESSMENT — PAIN DESCRIPTION - PAIN TYPE
TYPE: ACUTE PAIN

## 2024-04-26 ASSESSMENT — FIBROSIS 4 INDEX: FIB4 SCORE: 1.68

## 2024-04-26 NOTE — ASSESSMENT & PLAN NOTE
EKG at admission showed A-fib, with rate controlled.  No ischemic ST-T changes.   Continue home metoprolol  [id number] score 4  After discussion started Eliquis  Cardiology consulted, Zio patch on discharge

## 2024-04-26 NOTE — ED NOTES
Unable to complete med rec at this time   Pt unsure of the names of his medications   Home pharmacy is closed at this time

## 2024-04-26 NOTE — ED NOTES
ERP @ bedside    Pt given instructions for clean catch urine sample. Pt verbalized understanding.  Pt ambulatory to bathroom.

## 2024-04-26 NOTE — ED TRIAGE NOTES
"Victorianorafia Vasquez  84 y.o.  male    Chief Complaint   Patient presents with    Abdominal Pain     Periumbilical pain x 4-5 hrs. Pt reports he was sitting on couch watching tv when pain started. Pt denies any abdominal surgeries or constipation. Denies fevers at home, +chills. Reports pain is unchanged with eating food.         Pt to triage for above compliant. ABD protocol ordered.     Pt placed in lobby and educated on triage process. Pt encouraged to alert staff for any changes, pt verbalized understanding.     BP (!) 143/81   Pulse 88   Temp 36.1 °C (96.9 °F) (Temporal)   Resp 14   Ht 1.727 m (5' 8\")   Wt 84.5 kg (186 lb 4.6 oz)   SpO2 95%   BMI 28.33 kg/m²           "

## 2024-04-26 NOTE — ED PROVIDER NOTES
ED Provider Note    CHIEF COMPLAINT  Chief Complaint   Patient presents with    Abdominal Pain     Periumbilical pain x 4-5 hrs. Pt reports he was sitting on couch watching tv when pain started. Pt denies any abdominal surgeries or constipation. Denies fevers at home, +chills. Reports pain is unchanged with eating food.         EXTERNAL RECORDS REVIEWED  Inpatient Notes from prior admission in 2009 for chest pain unremarkable workup with troponins and stress test    HPI/ROS  LIMITATION TO HISTORY   Select: Language Estonian,  Used   OUTSIDE HISTORIAN(S):  none    Victoriano Vasquez is a 84 y.o. male who presents with abdominal pain.  Patient reports that last night around midnight he gradually began noticing some central abdominal pain, seems to be isolated to the upper middle part of his abdomen without radiation to his back or otherwise.  It has been fairly constant without real alleviating or aggravating factors.  He reports no associated nausea or vomiting or diarrhea.  No real change with eating.  He reports no chest pain or shortness of breath, no leg pain or swelling.  No focal weakness or numbness.  He does state he felt cold with the pain but no known fevers.  No urinary complaints    PAST MEDICAL HISTORY       SURGICAL HISTORY  patient denies any surgical history    FAMILY HISTORY  History reviewed. No pertinent family history.    SOCIAL HISTORY  Social History     Tobacco Use    Smoking status: Never    Smokeless tobacco: Never   Vaping Use    Vaping Use: Never used   Substance and Sexual Activity    Alcohol use: Never    Drug use: Never    Sexual activity: Not on file       CURRENT MEDICATIONS  Home Medications       Reviewed by Bora Chacko (Pharmacy Tech) on 04/26/24 at 0934  Med List Status: Unable to Obtain     Medication Last Dose Status   NON SPECIFIED  Active                    ALLERGIES  No Known Allergies    PHYSICAL EXAM  VITAL SIGNS: /55   Pulse 86   Temp 37.3 °C (99.1 °F)  "(Temporal)   Resp 18   Ht 1.727 m (5' 8\")   Wt 84.5 kg (186 lb 4.6 oz)   SpO2 91%   BMI 28.33 kg/m²      Pulse ox interpretation: I interpret this pulse ox as normal.  Constitutional: Alert   HENT: No signs of trauma, Bilateral external ears normal, Nose normal.   Eyes: Pupils are equal and reactive, Conjunctiva normal, Non-icteric.   Neck: Normal range of motion, No tenderness, Supple, No stridor.   Cardiovascular: Regular rate and rhythm, no murmurs.   Thorax & Lungs: Normal breath sounds, No respiratory distress, No wheezing, No chest tenderness.   Abdomen Soft, epigastric tenderness, No masses, No pulsatile masses. No peritoneal signs.  Skin: Warm, Dry, No erythema, No rash.   Back: No bony tenderness, No CVA tenderness.   Extremities: Intact distal pulses, No edema, No tenderness, No cyanosis,  Negative Delfino's sign.   Musculoskeletal: Good range of motion in all major joints. No tenderness to palpation or major deformities noted.   Neurologic: Alert , Normal motor function, Normal sensory function, No focal deficits noted.   Psychiatric: Affect normal, Judgment normal, Mood normal.               EKG/LABS  Results for orders placed or performed during the hospital encounter of 04/26/24   CBC WITH DIFFERENTIAL   Result Value Ref Range    WBC 8.8 4.8 - 10.8 K/uL    RBC 5.39 4.70 - 6.10 M/uL    Hemoglobin 16.4 14.0 - 18.0 g/dL    Hematocrit 49.1 42.0 - 52.0 %    MCV 91.1 81.4 - 97.8 fL    MCH 30.4 27.0 - 33.0 pg    MCHC 33.4 32.3 - 36.5 g/dL    RDW 53.7 (H) 35.9 - 50.0 fL    Platelet Count 259 164 - 446 K/uL    MPV 10.8 9.0 - 12.9 fL    Neutrophils-Polys 63.70 44.00 - 72.00 %    Lymphocytes 30.30 22.00 - 41.00 %    Monocytes 4.80 0.00 - 13.40 %    Eosinophils 0.80 0.00 - 6.90 %    Basophils 0.20 0.00 - 1.80 %    Immature Granulocytes 0.20 0.00 - 0.90 %    Nucleated RBC 0.00 0.00 - 0.20 /100 WBC    Neutrophils (Absolute) 5.58 1.82 - 7.42 K/uL    Lymphs (Absolute) 2.65 1.00 - 4.80 K/uL    Monos (Absolute) 0.42 " 0.00 - 0.85 K/uL    Eos (Absolute) 0.07 0.00 - 0.51 K/uL    Baso (Absolute) 0.02 0.00 - 0.12 K/uL    Immature Granulocytes (abs) 0.02 0.00 - 0.11 K/uL    NRBC (Absolute) 0.00 K/uL   COMP METABOLIC PANEL   Result Value Ref Range    Sodium 137 135 - 145 mmol/L    Potassium 3.5 (L) 3.6 - 5.5 mmol/L    Chloride 103 96 - 112 mmol/L    Co2 20 20 - 33 mmol/L    Anion Gap 14.0 7.0 - 16.0    Glucose 114 (H) 65 - 99 mg/dL    Bun 19 8 - 22 mg/dL    Creatinine 0.83 0.50 - 1.40 mg/dL    Calcium 9.4 8.5 - 10.5 mg/dL    Correct Calcium 9.2 8.5 - 10.5 mg/dL    AST(SGOT) 93 (H) 12 - 45 U/L    ALT(SGPT) 49 2 - 50 U/L    Alkaline Phosphatase 121 (H) 30 - 99 U/L    Total Bilirubin 1.3 0.1 - 1.5 mg/dL    Albumin 4.2 3.2 - 4.9 g/dL    Total Protein 7.9 6.0 - 8.2 g/dL    Globulin 3.7 (H) 1.9 - 3.5 g/dL    A-G Ratio 1.1 g/dL   LIPASE   Result Value Ref Range    Lipase 52 11 - 82 U/L   URINALYSIS    Specimen: Urine   Result Value Ref Range    Color Yellow     Character Clear     Specific Gravity 1.021 <1.035    Ph 6.0 5.0 - 8.0    Glucose Negative Negative mg/dL    Ketones 15 (A) Negative mg/dL    Protein Negative Negative mg/dL    Bilirubin Negative Negative    Urobilinogen, Urine 1.0 Negative    Nitrite Negative Negative    Leukocyte Esterase Negative Negative    Occult Blood Trace (A) Negative    Micro Urine Req Microscopic    ESTIMATED GFR   Result Value Ref Range    GFR (CKD-EPI) 85 >60 mL/min/1.73 m 2   TROPONIN   Result Value Ref Range    Troponin T 25 (H) 6 - 19 ng/L   URINE MICROSCOPIC (W/UA)   Result Value Ref Range    WBC 0-2 (A) /hpf    RBC 5-10 (A) /hpf    Bacteria Negative None /hpf    Epithelial Cells Negative /hpf    Hyaline Cast 0-2 /lpf   TROPONIN   Result Value Ref Range    Troponin T 21 (H) 6 - 19 ng/L   EKG (Now)   Result Value Ref Range    Report       [facility] Emergency Dept.    Test Date:  2024-04-26  Pt Name:    VARGAS WRIGHT               Department: ER  MRN:        [id number]                       Room:       [facility]  Gender:     Male                         Technician: [id number]  :        [id number]                   Requested By:MARILUZ BOYD  Order #:    [id number]                    Reading MD: MARILUZ BOYD MD    Measurements  Intervals                                Axis  Rate:       79                           P:          0  KS:         0                            QRS:        -72  QRSD:       99                           T:          46  QT:         380  QTc:        436    Interpretive Statements  SINUS ARRHYTHMIA  Right ventricular hypertrophy  Inferior infarct, old  Compared to ECG 2009 01:43:46  Right ventricular hypertrophy now present  Myocardial infarct finding now present  Left anterior fascicular block no longer present  Electronically Signed On 2024 09:38:08 PDT by MARILUZ BOYD MD         I have independently interpreted this EKG    RADIOLOGY/PROCEDURES   I have independently interpreted the diagnostic imaging associated with this visit and am waiting the final reading from the radiologist.   My preliminary interpretation is as follows: Gallstones    Radiologist interpretation:  US-RUQ   Final Result         1. Hepatomegaly.   2. Cholelithiasis.   3. There is biliary ductal dilatation. The distal common bile duct and pancreas are obscured by bowel gas. This could be further assessed with ERCP or MRCP.      CT-ABDOMEN-PELVIS WITH   Final Result         1.  Gallbladder distention with calcified gallstones, otherwise unremarkable.   2.  Intrahepatic biliary ductal dilatation, consider causes of biliary obstruction with additional workup as clinically appropriate.   3.  Diverticulosis   4.  Atherosclerosis   5.  Fat-containing bilateral inguinal hernias      [id number]    (Results Pending)       COURSE & MEDICAL DECISION MAKING    ASSESSMENT, COURSE AND PLAN  Care Narrative: 4:51 AM  Patient is evaluated the bedside chart is reviewed.  Differential diagnosis considered as  below.  Order for diagnostic labs, CT, will start IV fluids, offirmiv    Patient is reevaluated, updated on results thus far, pending ultrasound      Patient is reevaluated again, he reports his pain is improved, still mildly present, I discussed all findings and results with him, will plan for hospitalization.    This is discussed with Dr. Choi for admission            PROBLEMS MANAGED  # Epigastric abdominal pain.  Patient with pain beginning last night.  He does not have a surgical abdomen, however does have some abnormalities on his imaging require admission for further evaluation.  Ultrasound did show gallstones, CT with gallstones as well and ductal dilatation is noted or obvious findings of cholecystitis.  There is no elevation in his bilirubin or transaminases and he has no leukocytosis.  While this is considered to be potential gallbladder/biliary in origin, it is unclear the actual source of his pain.  As such patient will be admitted for MRCP as above and further evaluation.  The nature of his pain does not suggest referred cardiac although given his age and risk factors did check ECG troponin x 2 with negative delta    DISPOSITION AND DISCUSSIONS  Patient is admitted in stable condition    FINAL DIAGNOSIS  1. Epigastric pain    2. Dilation of biliary tract           Electronically signed by: Tomas Avila M.D., 4/26/2024 4:50 AM

## 2024-04-26 NOTE — ED NOTES
Bedside report received from off going RN/tech: Paula HAWK , assumed care of patient.  POC discussed with patient. Call light within reach, all needs addressed at this time.       Fall risk interventions in place: Patient's personal possessions are with in their safe reach, Place fall risk sign on patient's door, Give patient urinal if applicable, Keep floor surfaces clean and dry, and Other (comment required) (all applicable per [place] Fall risk assessment)   Continuous monitoring: Cardiac Leads, Pulse Ox, or Blood Pressure  IVF/IV medications: Not Applicable   Oxygen: Room Air  Bedside sitter: Not Applicable   Isolation: Not Applicable    Pts daughter @ bedside

## 2024-04-26 NOTE — H&P
Hospital Medicine History & Physical Note    Date of Service  4/26/2024    Primary Care Physician  Susi Gregorio M.D.    Consultants      Code Status  Full Code    Chief Complaint  Chief Complaint   Patient presents with    Abdominal Pain     Periumbilical pain x 4-5 hrs. Pt reports he was sitting on couch watching tv when pain started. Pt denies any abdominal surgeries or constipation. Denies fevers at home, +chills. Reports pain is unchanged with eating food.         History of Presenting Illness  Victoriano Vasquez is a 84 y.o. male with a history of hypothyroidism, hypertension, who presented 4/26/2024 with abdominal pain.  Patient reported the pain started yesterday, in the middle and lower abdominal area, more on the right side.  Denies nausea vomiting or diarrhea, constipation.     nA 137, K 3.5, , AST/ALT 93/49, total bili 1.3; Wbc 8.8, Hb 16.4, lipase 52  HR 86, /67, RA; Ua negative for UTI    RUQ U/S showed  biliary ductal dilatation. Ct ABD showed Gallbladder distention with calcified gallstones,  Intrahepatic biliary ductal dilatation    I discussed the plan of care with patient, family, and ER physician .    Review of Systems  All 12 systems were reviewed and negative except as mentioned above    Past Medical History   has no past medical history on file.    Surgical History   has no past surgical history on file.     Family History  family history is not on file.   Family history reviewed with patient. There is no family history that is pertinent to the chief complaint.     Social History   reports that he has never smoked. He has never used smokeless tobacco. He reports that he does not drink alcohol and does not use drugs.    Allergies  No Known Allergies    Medications  Prior to Admission Medications   Prescriptions Last Dose Informant Patient Reported? Taking?   NON SPECIFIED   Yes No   Sig: Medication for high cholersterol       Facility-Administered Medications: None       Physical  "Exam  Temp:  [36.1 °C (96.9 °F)-37.3 °C (99.1 °F)] 37.3 °C (99.1 °F)  Pulse:  [] 95  Resp:  [14-26] 15  BP: (110-170)/(53-88) 116/58  SpO2:  [91 %-96 %] 94 %  Blood Pressure : 110/55   Temperature: 37.3 °C (99.1 °F)   Pulse: 86   Respiration: 18   Pulse Oximetry: 91 %       Physical Exam  Constitutional:       Appearance: He is obese. He is ill-appearing.   HENT:      Head: Normocephalic.      Nose: Nose normal.      Mouth/Throat:      Mouth: Mucous membranes are moist.   Eyes:      Extraocular Movements: Extraocular movements intact.      Conjunctiva/sclera: Conjunctivae normal.   Cardiovascular:      Rate and Rhythm: Normal rate. Rhythm irregular.      Pulses: Normal pulses.      Heart sounds: Normal heart sounds.   Pulmonary:      Effort: Pulmonary effort is normal.      Breath sounds: Normal breath sounds. No wheezing or rales.   Abdominal:      General: Bowel sounds are normal.      Palpations: Abdomen is soft.      Tenderness: There is abdominal tenderness.   Musculoskeletal:         General: No swelling or tenderness.      Cervical back: Normal range of motion and neck supple.   Skin:     General: Skin is warm.   Neurological:      Mental Status: He is alert and oriented to person, place, and time. Mental status is at baseline.   Psychiatric:         Mood and Affect: Mood normal.         Laboratory:  Recent Labs     04/26/24 0359   WBC 8.8   RBC 5.39   HEMOGLOBIN 16.4   HEMATOCRIT 49.1   MCV 91.1   MCH 30.4   MCHC 33.4   RDW 53.7*   PLATELETCT 259   MPV 10.8     Recent Labs     04/26/24  0359   SODIUM 137   POTASSIUM 3.5*   CHLORIDE 103   CO2 20   GLUCOSE 114*   BUN 19   CREATININE 0.83   CALCIUM 9.4     Recent Labs     04/26/24  0359   ALTSGPT 49   ASTSGOT 93*   ALKPHOSPHAT 121*   TBILIRUBIN 1.3   LIPASE 52   GLUCOSE 114*         No results for input(s): \"NTPROBNP\" in the last 72 hours.      Recent Labs     04/26/24  0359 04/26/24  0649   TROPONINT 25* 21*       Imaging:  US-RUQ   Final Result       "   1. Hepatomegaly.   2. Cholelithiasis.   3. There is biliary ductal dilatation. The distal common bile duct and pancreas are obscured by bowel gas. This could be further assessed with ERCP or MRCP.      CT-ABDOMEN-PELVIS WITH   Final Result         1.  Gallbladder distention with calcified gallstones, otherwise unremarkable.   2.  Intrahepatic biliary ductal dilatation, consider causes of biliary obstruction with additional workup as clinically appropriate.   3.  Diverticulosis   4.  Atherosclerosis   5.  Fat-containing bilateral inguinal hernias      [id number]    (Results Pending)       X-Ray:  I have personally reviewed the images and compared with prior images.    Assessment/Plan:  Justification for Admission Status  I anticipate this patient will require at least two midnights for appropriate medical management, necessitating inpatient admission because adult attention , suspected choledocholithiasis may need an intervention    Patient will need a Med/Surg bed on EMERGENCY service .  The need is secondary to bile duct dilation suspected choledocholithiasis may need an intervention.    * Abdominal pain- (present on admission)  Assessment & Plan  Abdominal pain for 1 day, more on the RUQ area and RLQ  No nausea vomiting or diarrhea  Trop 25>21; EKG afib, no acute ischemic STT changes    Elevated liver enzymes, AST/ALT 93/49, total bili 1.3  lipase 52      RUQ U/S showed biliary ductal dilatation. The distal common bile duct and pancreas are obscured by bowel gas.   Ct ABD showed gallbladder distention with calcified gallstones, Intrahepatic biliary ductal dilatation    I ordered a MRCP  I ordered a pain management  I ordered am CBC and CMP to monitor      Hypokalemia  Assessment & Plan  Replaced  I ordered mag and Phos    AF (atrial fibrillation) (HCC)  Assessment & Plan  EKG at admission showed A-fib, with rate controlled.  No ischemic ST-T changes.   Continue home metoprolol  [id number] score 3 (age and  hypertension)  Patient is not on home OAC.  Need to discussed with the patient after MRCP done and no procedure planned    Primary hypertension  Assessment & Plan  I resumed the home amlodipine and metoprolol    Hypothyroidism  Assessment & Plan  I resumed the home levothyroxine  I ordered a TSH    Elevated liver enzymes  Assessment & Plan  AST/ALT 93/49, total bili 1.3  Ultrasound and CT showed bile duct dilation and gallbladder stones  MRCP pending  I ordered a.m. CMP to monitor    ACP (advance care planning)  Assessment & Plan  Aox4. patient admitted for abdominal pain, possible choledocholithiasis.  Advanced age of 84.  I discussed the advanced care planning with the patient and the wife at bedside.  We discussed the diagnosis, treatments, benefits and risks.  We discussed the CODE STATUS including CPR and intubation.  The patient is willing to stay full code.  ACP 16 minutes        VTE prophylaxis: SCDs/TEDs  Total time spent on admission 77 minutes.    This included my review with ER physician, review of ED events, patient's history, outside records, recent records, face to face interview, physical examination; my review of lab results (CBC, chemistry panel), imaging review (CXR) and ECG. Also includes counseling patient on admission, treatment plan, and documentation of encounter.

## 2024-04-26 NOTE — ED NOTES
Med Rec completed per patient and home pharmacy ([facility])   Allergies reviewed  No ORAL antibiotics in last 30 days    Patient is not taking anticoagulants     Per [facility] patient is prescribed to takes Metoprolol Succinate TWICE a day

## 2024-04-26 NOTE — CARE PLAN
The patient is Watcher - Medium risk of patient condition declining or worsening    Shift Goals  Clinical Goals: Pt will remain free from pain throughout shift.  Patient Goals: rest  Family Goals: MEET  Pt remained free from falls throughout shift with hourly rounding, use of call light, and treaded socks in place.     Progress made toward(s) clinical / shift goals:    Problem: Pain - Standard  Goal: Alleviation of pain or a reduction in pain to the patient’s comfort goal  Outcome: Progressing  Pt verbalized throughout shift he was not experiencing any pain. Pt able to rest comfortable with even chest rise and fall.      Problem: Knowledge Deficit - Standard  Goal: Patient and family/care givers will demonstrate understanding of plan of care, disease process/condition, diagnostic tests and medications  Outcome: Progressing   Pt verbalized understanding of MRI purpose and his current full liquid diet.     Patient is not progressing towards the following goals:NA

## 2024-04-26 NOTE — ASSESSMENT & PLAN NOTE
Abdominal pain for 1 day, more on the RUQ area and RLQ  No nausea vomiting or diarrhea  Trop 25>21; EKG afib, no acute ischemic STT changes    Elevated liver enzymes, AST/ALT 93/49, total bili 1.3  lipase 52      RUQ U/S showed biliary ductal dilatation. The distal common bile duct and pancreas are obscured by bowel gas.   Ct ABD showed gallbladder distention with calcified gallstones, Intrahepatic biliary ductal dilatation  MRCP possible early cholecystitis  -started rocephin     HIDA positive   General surgery consulted

## 2024-04-26 NOTE — ASSESSMENT & PLAN NOTE
AST/ALT 93/49, total bili 1.3  Ultrasound and CT showed bile duct dilation and gallbladder stones  MRCP pending  I ordered a.m. CMP to monitor

## 2024-04-26 NOTE — ASSESSMENT & PLAN NOTE
Aox4. patient admitted for abdominal pain, possible choledocholithiasis.  Advanced age of 84.  I discussed the advanced care planning with the patient and the wife at bedside.  We discussed the diagnosis, treatments, benefits and risks.  We discussed the CODE STATUS including CPR and intubation.  The patient is willing to stay full code.  ACP 16 minutes

## 2024-04-26 NOTE — PROGRESS NOTES
4 Eyes Skin Assessment Completed by KENN Garcia and KENN Hull.    Head WDL  Ears WDL  Nose WDL  Mouth WDL  Neck WDL  Breast/Chest WDL  Shoulder Blades WDL  Spine WDL  (R) Arm/Elbow/Hand WDL  (L) Arm/Elbow/Hand WDL  Abdomen WDL  Groin WDL  Scrotum/Coccyx/Buttocks WDL  (R) Leg WDL  (L) Leg WDL  (R) Heel/Foot/Toe WDL  (L) Heel/Foot/Toe WDL      Interventions In Place Pillows    Possible Skin Injury No    Pictures Uploaded Into Epic Yes  Wound Consult Placed N/A  RN Wound Prevention Protocol Ordered No

## 2024-04-27 ENCOUNTER — APPOINTMENT (OUTPATIENT)
Dept: RADIOLOGY | Facility: MEDICAL CENTER | Age: 84
DRG: 419 | End: 2024-04-27
Attending: INTERNAL MEDICINE
Payer: MEDICARE

## 2024-04-27 PROBLEM — R17 ELEVATED BILIRUBIN: Status: ACTIVE | Noted: 2024-04-27

## 2024-04-27 LAB
ALBUMIN SERPL BCP-MCNC: 3.4 G/DL (ref 3.2–4.9)
ALBUMIN/GLOB SERPL: 1.2 G/DL
ALP SERPL-CCNC: 143 U/L (ref 30–99)
ALT SERPL-CCNC: 181 U/L (ref 2–50)
ANION GAP SERPL CALC-SCNC: 12 MMOL/L (ref 7–16)
AST SERPL-CCNC: 134 U/L (ref 12–45)
BILIRUB SERPL-MCNC: 3.6 MG/DL (ref 0.1–1.5)
BUN SERPL-MCNC: 15 MG/DL (ref 8–22)
CALCIUM ALBUM COR SERPL-MCNC: 9.3 MG/DL (ref 8.5–10.5)
CALCIUM SERPL-MCNC: 8.8 MG/DL (ref 8.5–10.5)
CHLORIDE SERPL-SCNC: 106 MMOL/L (ref 96–112)
CO2 SERPL-SCNC: 21 MMOL/L (ref 20–33)
CREAT SERPL-MCNC: 0.76 MG/DL (ref 0.5–1.4)
ERYTHROCYTE [DISTWIDTH] IN BLOOD BY AUTOMATED COUNT: 51.9 FL (ref 35.9–50)
GFR SERPLBLD CREATININE-BSD FMLA CKD-EPI: 88 ML/MIN/1.73 M 2
GLOBULIN SER CALC-MCNC: 2.8 G/DL (ref 1.9–3.5)
GLUCOSE SERPL-MCNC: 83 MG/DL (ref 65–99)
HAV IGM SERPL QL IA: NORMAL
HBV CORE IGM SER QL: NORMAL
HBV SURFACE AG SER QL: NORMAL
HCT VFR BLD AUTO: 42.6 % (ref 42–52)
HCV AB SER QL: NORMAL
HGB BLD-MCNC: 14.7 G/DL (ref 14–18)
MAGNESIUM SERPL-MCNC: 1.8 MG/DL (ref 1.5–2.5)
MCH RBC QN AUTO: 30.4 PG (ref 27–33)
MCHC RBC AUTO-ENTMCNC: 34.5 G/DL (ref 32.3–36.5)
MCV RBC AUTO: 88 FL (ref 81.4–97.8)
PHOSPHATE SERPL-MCNC: 3 MG/DL (ref 2.5–4.5)
PLATELET # BLD AUTO: 199 K/UL (ref 164–446)
PMV BLD AUTO: 11.1 FL (ref 9–12.9)
POTASSIUM SERPL-SCNC: 4 MMOL/L (ref 3.6–5.5)
PROT SERPL-MCNC: 6.2 G/DL (ref 6–8.2)
RBC # BLD AUTO: 4.84 M/UL (ref 4.7–6.1)
SODIUM SERPL-SCNC: 139 MMOL/L (ref 135–145)
TSH SERPL DL<=0.005 MIU/L-ACNC: 2.48 UIU/ML (ref 0.38–5.33)
WBC # BLD AUTO: 11.5 K/UL (ref 4.8–10.8)

## 2024-04-27 PROCEDURE — 36415 COLL VENOUS BLD VENIPUNCTURE: CPT

## 2024-04-27 PROCEDURE — 84100 ASSAY OF PHOSPHORUS: CPT

## 2024-04-27 PROCEDURE — 700111 HCHG RX REV CODE 636 W/ 250 OVERRIDE (IP): Mod: JZ,JG | Performed by: RADIOLOGY

## 2024-04-27 PROCEDURE — 82306 VITAMIN D 25 HYDROXY: CPT

## 2024-04-27 PROCEDURE — 99233 SBSQ HOSP IP/OBS HIGH 50: CPT | Performed by: INTERNAL MEDICINE

## 2024-04-27 PROCEDURE — 700102 HCHG RX REV CODE 250 W/ 637 OVERRIDE(OP): Performed by: STUDENT IN AN ORGANIZED HEALTH CARE EDUCATION/TRAINING PROGRAM

## 2024-04-27 PROCEDURE — 700101 HCHG RX REV CODE 250: Performed by: INTERNAL MEDICINE

## 2024-04-27 PROCEDURE — 700111 HCHG RX REV CODE 636 W/ 250 OVERRIDE (IP): Performed by: INTERNAL MEDICINE

## 2024-04-27 PROCEDURE — 85027 COMPLETE CBC AUTOMATED: CPT

## 2024-04-27 PROCEDURE — 83735 ASSAY OF MAGNESIUM: CPT

## 2024-04-27 PROCEDURE — A9270 NON-COVERED ITEM OR SERVICE: HCPCS | Performed by: STUDENT IN AN ORGANIZED HEALTH CARE EDUCATION/TRAINING PROGRAM

## 2024-04-27 PROCEDURE — 84443 ASSAY THYROID STIM HORMONE: CPT

## 2024-04-27 PROCEDURE — 80074 ACUTE HEPATITIS PANEL: CPT

## 2024-04-27 PROCEDURE — 770006 HCHG ROOM/CARE - MED/SURG/GYN SEMI*

## 2024-04-27 PROCEDURE — 99222 1ST HOSP IP/OBS MODERATE 55: CPT | Mod: 57 | Performed by: SURGERY

## 2024-04-27 PROCEDURE — 80053 COMPREHEN METABOLIC PANEL: CPT

## 2024-04-27 RX ORDER — MORPHINE SULFATE 4 MG/ML
3 INJECTION INTRAVENOUS ONCE
Status: COMPLETED | OUTPATIENT
Start: 2024-04-27 | End: 2024-04-27

## 2024-04-27 RX ORDER — MORPHINE SULFATE 4 MG/ML
INJECTION INTRAVENOUS
Status: DISPENSED
Start: 2024-04-27 | End: 2024-04-28

## 2024-04-27 RX ADMIN — METOPROLOL SUCCINATE 25 MG: 25 TABLET, FILM COATED, EXTENDED RELEASE ORAL at 16:41

## 2024-04-27 RX ADMIN — CEFTRIAXONE SODIUM 2000 MG: 10 INJECTION, POWDER, FOR SOLUTION INTRAVENOUS at 12:18

## 2024-04-27 RX ADMIN — LEVOTHYROXINE SODIUM 100 MCG: 0.1 TABLET ORAL at 05:01

## 2024-04-27 RX ADMIN — MORPHINE SULFATE 3 MG: 4 INJECTION, SOLUTION INTRAMUSCULAR; INTRAVENOUS at 15:24

## 2024-04-27 ASSESSMENT — ENCOUNTER SYMPTOMS
NAUSEA: 0
MYALGIAS: 0
VOMITING: 0
HEADACHES: 0
DEPRESSION: 0
CHILLS: 0
FEVER: 0
DIZZINESS: 0
ABDOMINAL PAIN: 1
SHORTNESS OF BREATH: 0

## 2024-04-27 ASSESSMENT — FIBROSIS 4 INDEX
FIB4 SCORE: 4.3
FIB4 SCORE: 4.3

## 2024-04-27 NOTE — CARE PLAN
The patient is Stable - Low risk of patient condition declining or worsening    Shift Goals  Clinical Goals: HIDA scan, labs  Patient Goals: go home olu.  Family Goals: none present    Progress made toward(s) clinical / shift goals:  pt. Received resting comfortably in bed, has complaints of abdominal pain when moving but otherwise comfortable while resting. HIDA scan done awaiting results. Educated about falls, kept npo for scan. Rocephin iv abx started per order.  services utilized.      Problem: Pain - Standard  Goal: Alleviation of pain or a reduction in pain to the patient’s comfort goal  Outcome: Progressing     Problem: Knowledge Deficit - Standard  Goal: Patient and family/care givers will demonstrate understanding of plan of care, disease process/condition, diagnostic tests and medications  Outcome: Progressing       Patient is not progressing towards the following goals:

## 2024-04-27 NOTE — ASSESSMENT & PLAN NOTE
New Total bili 3.1 as of this morning    MRCP negative yesterday   Discussed with GI, will get HIDA scan

## 2024-04-27 NOTE — PROGRESS NOTES
Hospital Medicine Daily Progress Note    Date of Service  4/27/2024    Chief Complaint  Victoriano Vasquez is a 84 y.o. male admitted 4/26/2024 with abdominal pain     Hospital Course  84 y.o. male with a history of hypothyroidism, hypertension, who presented 4/26/2024 with abdominal pain.  Patient reported the pain started yesterday, in the middle and lower abdominal area, more on the right side.  Denies nausea vomiting or diarrhea, constipation.      nA 137, K 3.5, , AST/ALT 93/49, total bili 1.3; Wbc 8.8, Hb 16.4, lipase 52  HR 86, /67, RA; Ua negative for UTI. RUQ U/S showed  biliary ductal dilatation. Ct ABD showed Gallbladder distention with calcified gallstones,  Intrahepatic biliary ductal dilatation       Interval Problem Update  MRCP showed normal CBD, cholelithiasis and mild gallbladder wall thickening without pericholecystic fluid, possible early cholecystitis   WBC 11.5 this morning. Vitals stable   Liver enzymes worsening, , , t bili 3.6   Patient reports he is mostly having pain with movement in the RUQ. Denies any alcohol history. No fever, chills    Discussed with GI recommended HIDA scan which is pending.     I have discussed this patient's plan of care and discharge plan at IDT rounds today with Case Management, Nursing, Nursing leadership, and other members of the IDT team.    Consultants/Specialty  none    Code Status  Full Code    Disposition  The patient is not medically cleared for discharge to home or a post-acute facility.  Anticipate discharge to: home with close outpatient follow-up    I have placed the appropriate orders for post-discharge needs.    Review of Systems  Review of Systems   Constitutional:  Negative for chills and fever.   Respiratory:  Negative for shortness of breath.    Cardiovascular:  Negative for chest pain.   Gastrointestinal:  Positive for abdominal pain. Negative for nausea and vomiting.   Musculoskeletal:  Negative for myalgias.   Skin:   Negative for rash.   Neurological:  Negative for dizziness and headaches.   Psychiatric/Behavioral:  Negative for depression.    All other systems reviewed and are negative.       Physical Exam  Temp:  [36.1 °C (97 °F)-36.6 °C (97.9 °F)] 36.4 °C (97.6 °F)  Pulse:  [56-82] 56  Resp:  [16-18] 17  BP: ()/(45-66) 111/47  SpO2:  [93 %-95 %] 93 %    Physical Exam  Vitals and nursing note reviewed.   Constitutional:       General: He is not in acute distress.     Appearance: Normal appearance. He is not ill-appearing.   HENT:      Head: Normocephalic and atraumatic.   Eyes:      Conjunctiva/sclera: Conjunctivae normal.      Pupils: Pupils are equal, round, and reactive to light.   Cardiovascular:      Rate and Rhythm: Normal rate and regular rhythm.      Pulses: Normal pulses.   Pulmonary:      Effort: Pulmonary effort is normal. No respiratory distress.      Breath sounds: Normal breath sounds. No wheezing.   Abdominal:      General: Abdomen is flat. There is no distension.      Palpations: Abdomen is soft.      Tenderness: There is abdominal tenderness.   Musculoskeletal:         General: No swelling. Normal range of motion.      Cervical back: Normal range of motion and neck supple.   Skin:     General: Skin is warm and dry.      Findings: No rash.   Neurological:      General: No focal deficit present.      Mental Status: He is alert and oriented to person, place, and time.      Cranial Nerves: No cranial nerve deficit.   Psychiatric:         Mood and Affect: Mood normal.         Behavior: Behavior normal.         Fluids    Intake/Output Summary (Last 24 hours) at 4/27/2024 1408  Last data filed at 4/27/2024 0502  Gross per 24 hour   Intake 150 ml   Output --   Net 150 ml       Laboratory  Recent Labs     04/26/24  0359 04/27/24  0200   WBC 8.8 11.5*   RBC 5.39 4.84   HEMOGLOBIN 16.4 14.7   HEMATOCRIT 49.1 42.6   MCV 91.1 88.0   MCH 30.4 30.4   MCHC 33.4 34.5   RDW 53.7* 51.9*   PLATELETCT 259 199   MPV 10.8 11.1      Recent Labs     04/26/24  0359 04/27/24  0200   SODIUM 137 139   POTASSIUM 3.5* 4.0   CHLORIDE 103 106   CO2 20 21   GLUCOSE 114* 83   BUN 19 15   CREATININE 0.83 0.76   CALCIUM 9.4 8.8                   Imaging  [id number]   Final Result      1.  Normal CBD size for age. No intrahepatic biliary dilatation.   2.  Allowing for limitations of motion degradation, no CBD stones detected.   3.  Cholelithiasis and mild gallbladder wall thickening, without pericholecystic fluid. Early acute cholecystitis can be considered in the appropriate clinical settings.   4.  L2 compression fracture, new since 2015. Please correlate with back pain.         US-RUQ   Final Result         1. Hepatomegaly.   2. Cholelithiasis.   3. There is biliary ductal dilatation. The distal common bile duct and pancreas are obscured by bowel gas. This could be further assessed with ERCP or MRCP.      CT-ABDOMEN-PELVIS WITH   Final Result         1.  Gallbladder distention with calcified gallstones, otherwise unremarkable.   2.  Intrahepatic biliary ductal dilatation, consider causes of biliary obstruction with additional workup as clinically appropriate.   3.  Diverticulosis   4.  Atherosclerosis   5.  Fat-containing bilateral inguinal hernias      NM-BILIARY (HIDA) SCAN WITH CCK    (Results Pending)        Assessment/Plan  * Abdominal pain- (present on admission)  Assessment & Plan  Abdominal pain for 1 day, more on the RUQ area and RLQ  No nausea vomiting or diarrhea  Trop 25>21; EKG afib, no acute ischemic STT changes    Elevated liver enzymes, AST/ALT 93/49, total bili 1.3  lipase 52      RUQ U/S showed biliary ductal dilatation. The distal common bile duct and pancreas are obscured by bowel gas.   Ct ABD showed gallbladder distention with calcified gallstones, Intrahepatic biliary ductal dilatation  MRCP possible early cholecystitis  -started rocephin     HIDA pending       Elevated bilirubin  Assessment & Plan  New Total bili 3.1 as of  this morning    MRCP negative yesterday   Discussed with GI, will get HIDA scan       Hypokalemia  Assessment & Plan  Replaced  I ordered mag and Phos    AF (atrial fibrillation) (HCC)  Assessment & Plan  EKG at admission showed A-fib, with rate controlled.  No ischemic ST-T changes.   Continue home metoprolol  [id number] score 3 (age and hypertension)  Patient is not on home OAC.  Need to discussed with the patient after MRCP done and no procedure planned    Primary hypertension  Assessment & Plan  I resumed the home amlodipine and metoprolol    Hypothyroidism  Assessment & Plan  I resumed the home levothyroxine  I ordered a TSH    Elevated liver enzymes  Assessment & Plan  AST/ALT 93/49, total bili 1.3  Ultrasound and CT showed bile duct dilation and gallbladder stones  MRCP pending  I ordered a.m. CMP to monitor    ACP (advance care planning)  Assessment & Plan  Aox4. patient admitted for abdominal pain, possible choledocholithiasis.  Advanced age of 84.  I discussed the advanced care planning with the patient and the wife at bedside.  We discussed the diagnosis, treatments, benefits and risks.  We discussed the CODE STATUS including CPR and intubation.  The patient is willing to stay full code.  ACP 16 minutes      Total time spent in chart review, at bedside with the patient, discussing with consultants, nursing and case management: 55 minutes    VTE prophylaxis: SCDs/TEDs    I have performed a physical exam and reviewed and updated ROS and Plan today (4/27/2024). In review of yesterday's note (4/26/2024), there are no changes except as documented above.

## 2024-04-27 NOTE — DOCUMENTATION QUERY
[facility]                                                                       Query Response Note      PATIENT:               VARGAS WRIGHT  ACCT #:                  [id number]  MRN:                     [id number]  :                      1940  ADMIT DATE:       2024 4:10 AM  DISCH DATE:          RESPONDING  PROVIDER #:        [id number]           QUERY TEXT:    Atrial fibrillation is documented in the Medical Record.      Please specify the type.      The patient's Clinical Indicators include:  A fib noted in H/P with patient on Metoprolol at home  EKG shows A fib, Pulse   can a fib be further specified?  Options provided:   -- Paroxysmal atrial fibrillation (self-terminating or intermittent spontaneously or with intervention within 7 days of onset)   -- Permanent atrial fibrillation (persistent or longstanding persistent AF where cardioversion cannot or will not be performed   -- Other persistent atrial fibrillation (AF that does not terminate within 7 days or that requires repeat pharmacological or electrical cardioversion.   -- Longstanding persistent atrial fibrillation (AF that is persistent and continuous, lasting longer than 1 year)   -- Chronic atrial fibrillation, unspecified (may refer to persistent, longstanding persistent or permanent AF.  A more specific descriptive term is preferred over the nonspecific AF   -- Atrial fibrillation unspecified   -- Other explanation, (please specify other explanation)      Query created by: Angely Soto on 2024 1:06 PM    RESPONSE TEXT:    Paroxysmal atrial fibrillation (self-terminating or intermittent spontaneously or with intervention within 7 days of onset)          Electronically signed by:  SHEREE CHOWDHURY MD 2024 6:50 PM

## 2024-04-27 NOTE — CARE PLAN
The patient is Watcher - Medium risk of patient condition declining or worsening    Shift Goals  Clinical Goals: Pain control  Patient Goals: go home olu.  Family Goals: none present    Progress made toward(s) clinical / shift goals: Slightly tender abdomen, however, patient denies any pain and discomfort. Encouraged to verbalize feelings. Provided comfort and safety measures.    Patient is not progressing towards the following goals: N/A

## 2024-04-28 ENCOUNTER — ANESTHESIA EVENT (OUTPATIENT)
Dept: SURGERY | Facility: MEDICAL CENTER | Age: 84
End: 2024-04-28
Payer: MEDICARE

## 2024-04-28 ENCOUNTER — APPOINTMENT (OUTPATIENT)
Dept: CARDIOLOGY | Facility: MEDICAL CENTER | Age: 84
DRG: 419 | End: 2024-04-28
Attending: INTERNAL MEDICINE
Payer: MEDICARE

## 2024-04-28 ENCOUNTER — ANESTHESIA (OUTPATIENT)
Dept: SURGERY | Facility: MEDICAL CENTER | Age: 84
End: 2024-04-28
Payer: MEDICARE

## 2024-04-28 ENCOUNTER — APPOINTMENT (OUTPATIENT)
Dept: RADIOLOGY | Facility: MEDICAL CENTER | Age: 84
DRG: 419 | End: 2024-04-28
Attending: SURGERY
Payer: MEDICARE

## 2024-04-28 PROBLEM — Z01.818 ENCOUNTER FOR PERIOPERATIVE CONSULTATION: Status: ACTIVE | Noted: 2024-04-28

## 2024-04-28 PROBLEM — K81.0 ACUTE CHOLECYSTITIS: Status: ACTIVE | Noted: 2024-04-28

## 2024-04-28 LAB
25(OH)D3 SERPL-MCNC: 33 NG/ML (ref 30–100)
ALBUMIN SERPL BCP-MCNC: 3.3 G/DL (ref 3.2–4.9)
ALBUMIN/GLOB SERPL: 1.1 G/DL
ALP SERPL-CCNC: 153 U/L (ref 30–99)
ALT SERPL-CCNC: 114 U/L (ref 2–50)
ANION GAP SERPL CALC-SCNC: 13 MMOL/L (ref 7–16)
AST SERPL-CCNC: 64 U/L (ref 12–45)
BILIRUB SERPL-MCNC: 1.4 MG/DL (ref 0.1–1.5)
BUN SERPL-MCNC: 14 MG/DL (ref 8–22)
CALCIUM ALBUM COR SERPL-MCNC: 9 MG/DL (ref 8.5–10.5)
CALCIUM SERPL-MCNC: 8.4 MG/DL (ref 8.5–10.5)
CHLORIDE SERPL-SCNC: 101 MMOL/L (ref 96–112)
CO2 SERPL-SCNC: 22 MMOL/L (ref 20–33)
CREAT SERPL-MCNC: 0.76 MG/DL (ref 0.5–1.4)
ERYTHROCYTE [DISTWIDTH] IN BLOOD BY AUTOMATED COUNT: 53.4 FL (ref 35.9–50)
GFR SERPLBLD CREATININE-BSD FMLA CKD-EPI: 88 ML/MIN/1.73 M 2
GLOBULIN SER CALC-MCNC: 2.9 G/DL (ref 1.9–3.5)
GLUCOSE SERPL-MCNC: 73 MG/DL (ref 65–99)
HCT VFR BLD AUTO: 43.1 % (ref 42–52)
HGB BLD-MCNC: 14.6 G/DL (ref 14–18)
MCH RBC QN AUTO: 30.2 PG (ref 27–33)
MCHC RBC AUTO-ENTMCNC: 33.9 G/DL (ref 32.3–36.5)
MCV RBC AUTO: 89 FL (ref 81.4–97.8)
PLATELET # BLD AUTO: 199 K/UL (ref 164–446)
PMV BLD AUTO: 11 FL (ref 9–12.9)
POTASSIUM SERPL-SCNC: 3.8 MMOL/L (ref 3.6–5.5)
PROT SERPL-MCNC: 6.2 G/DL (ref 6–8.2)
RBC # BLD AUTO: 4.84 M/UL (ref 4.7–6.1)
SODIUM SERPL-SCNC: 136 MMOL/L (ref 135–145)
WBC # BLD AUTO: 9.2 K/UL (ref 4.8–10.8)

## 2024-04-28 PROCEDURE — 700117 HCHG RX CONTRAST REV CODE 255: Performed by: SURGERY

## 2024-04-28 PROCEDURE — 160002 HCHG RECOVERY MINUTES (STAT): Performed by: SURGERY

## 2024-04-28 PROCEDURE — 160035 HCHG PACU - 1ST 60 MINS PHASE I: Performed by: SURGERY

## 2024-04-28 PROCEDURE — 160048 HCHG OR STATISTICAL LEVEL 1-5: Performed by: SURGERY

## 2024-04-28 PROCEDURE — 700111 HCHG RX REV CODE 636 W/ 250 OVERRIDE (IP): Mod: JZ | Performed by: ANESTHESIOLOGY

## 2024-04-28 PROCEDURE — 160036 HCHG PACU - EA ADDL 30 MINS PHASE I: Performed by: SURGERY

## 2024-04-28 PROCEDURE — 85027 COMPLETE CBC AUTOMATED: CPT

## 2024-04-28 PROCEDURE — 88304 TISSUE EXAM BY PATHOLOGIST: CPT

## 2024-04-28 PROCEDURE — 99233 SBSQ HOSP IP/OBS HIGH 50: CPT | Performed by: INTERNAL MEDICINE

## 2024-04-28 PROCEDURE — A9270 NON-COVERED ITEM OR SERVICE: HCPCS | Performed by: ANESTHESIOLOGY

## 2024-04-28 PROCEDURE — 700102 HCHG RX REV CODE 250 W/ 637 OVERRIDE(OP): Performed by: STUDENT IN AN ORGANIZED HEALTH CARE EDUCATION/TRAINING PROGRAM

## 2024-04-28 PROCEDURE — 700111 HCHG RX REV CODE 636 W/ 250 OVERRIDE (IP): Performed by: INTERNAL MEDICINE

## 2024-04-28 PROCEDURE — 80053 COMPREHEN METABOLIC PANEL: CPT

## 2024-04-28 PROCEDURE — 160041 HCHG SURGERY MINUTES - EA ADDL 1 MIN LEVEL 4: Performed by: SURGERY

## 2024-04-28 PROCEDURE — 110371 HCHG SHELL REV 272: Performed by: SURGERY

## 2024-04-28 PROCEDURE — 47562 LAPAROSCOPIC CHOLECYSTECTOMY: CPT | Mod: 22 | Performed by: SURGERY

## 2024-04-28 PROCEDURE — A9270 NON-COVERED ITEM OR SERVICE: HCPCS | Performed by: STUDENT IN AN ORGANIZED HEALTH CARE EDUCATION/TRAINING PROGRAM

## 2024-04-28 PROCEDURE — 700101 HCHG RX REV CODE 250: Performed by: ANESTHESIOLOGY

## 2024-04-28 PROCEDURE — 700101 HCHG RX REV CODE 250: Performed by: SURGERY

## 2024-04-28 PROCEDURE — 700102 HCHG RX REV CODE 250 W/ 637 OVERRIDE(OP): Performed by: ANESTHESIOLOGY

## 2024-04-28 PROCEDURE — 700101 HCHG RX REV CODE 250: Performed by: INTERNAL MEDICINE

## 2024-04-28 PROCEDURE — 160029 HCHG SURGERY MINUTES - 1ST 30 MINS LEVEL 4: Performed by: SURGERY

## 2024-04-28 PROCEDURE — 700105 HCHG RX REV CODE 258: Performed by: ANESTHESIOLOGY

## 2024-04-28 PROCEDURE — 160009 HCHG ANES TIME/MIN: Performed by: SURGERY

## 2024-04-28 PROCEDURE — 36415 COLL VENOUS BLD VENIPUNCTURE: CPT

## 2024-04-28 PROCEDURE — 0FT44ZZ RESECTION OF GALLBLADDER, PERCUTANEOUS ENDOSCOPIC APPROACH: ICD-10-PCS | Performed by: SURGERY

## 2024-04-28 PROCEDURE — 770006 HCHG ROOM/CARE - MED/SURG/GYN SEMI*

## 2024-04-28 PROCEDURE — 47562 LAPAROSCOPIC CHOLECYSTECTOMY: CPT | Mod: AS | Performed by: NURSE PRACTITIONER

## 2024-04-28 RX ORDER — DIPHENHYDRAMINE HYDROCHLORIDE 50 MG/ML
12.5 INJECTION INTRAMUSCULAR; INTRAVENOUS
Status: DISCONTINUED | OUTPATIENT
Start: 2024-04-28 | End: 2024-04-28 | Stop reason: HOSPADM

## 2024-04-28 RX ORDER — METOPROLOL TARTRATE 1 MG/ML
INJECTION, SOLUTION INTRAVENOUS PRN
Status: DISCONTINUED | OUTPATIENT
Start: 2024-04-28 | End: 2024-04-28 | Stop reason: SURG

## 2024-04-28 RX ORDER — HYDRALAZINE HYDROCHLORIDE 20 MG/ML
5 INJECTION INTRAMUSCULAR; INTRAVENOUS
Status: DISCONTINUED | OUTPATIENT
Start: 2024-04-28 | End: 2024-04-28 | Stop reason: HOSPADM

## 2024-04-28 RX ORDER — CEFOTETAN DISODIUM 2 G/20ML
INJECTION, POWDER, FOR SOLUTION INTRAMUSCULAR; INTRAVENOUS PRN
Status: DISCONTINUED | OUTPATIENT
Start: 2024-04-28 | End: 2024-04-28 | Stop reason: SURG

## 2024-04-28 RX ORDER — LIDOCAINE HYDROCHLORIDE 20 MG/ML
INJECTION, SOLUTION EPIDURAL; INFILTRATION; INTRACAUDAL; PERINEURAL PRN
Status: DISCONTINUED | OUTPATIENT
Start: 2024-04-28 | End: 2024-04-28 | Stop reason: SURG

## 2024-04-28 RX ORDER — ACETAMINOPHEN 500 MG
1000 TABLET ORAL ONCE
Status: COMPLETED | OUTPATIENT
Start: 2024-04-28 | End: 2024-04-28

## 2024-04-28 RX ORDER — EPHEDRINE SULFATE 50 MG/ML
5 INJECTION, SOLUTION INTRAVENOUS
Status: DISCONTINUED | OUTPATIENT
Start: 2024-04-28 | End: 2024-04-28 | Stop reason: HOSPADM

## 2024-04-28 RX ORDER — OXYCODONE HCL 5 MG/5 ML
5 SOLUTION, ORAL ORAL
Status: COMPLETED | OUTPATIENT
Start: 2024-04-28 | End: 2024-04-28

## 2024-04-28 RX ORDER — ROCURONIUM BROMIDE 10 MG/ML
INJECTION, SOLUTION INTRAVENOUS PRN
Status: DISCONTINUED | OUTPATIENT
Start: 2024-04-28 | End: 2024-04-28 | Stop reason: SURG

## 2024-04-28 RX ORDER — HYDROMORPHONE HYDROCHLORIDE 1 MG/ML
0.1 INJECTION, SOLUTION INTRAMUSCULAR; INTRAVENOUS; SUBCUTANEOUS
Status: DISCONTINUED | OUTPATIENT
Start: 2024-04-28 | End: 2024-04-28 | Stop reason: HOSPADM

## 2024-04-28 RX ORDER — ONDANSETRON 2 MG/ML
4 INJECTION INTRAMUSCULAR; INTRAVENOUS
Status: DISCONTINUED | OUTPATIENT
Start: 2024-04-28 | End: 2024-04-28 | Stop reason: HOSPADM

## 2024-04-28 RX ORDER — SODIUM CHLORIDE, SODIUM LACTATE, POTASSIUM CHLORIDE, CALCIUM CHLORIDE 600; 310; 30; 20 MG/100ML; MG/100ML; MG/100ML; MG/100ML
INJECTION, SOLUTION INTRAVENOUS
Status: DISCONTINUED | OUTPATIENT
Start: 2024-04-28 | End: 2024-04-28 | Stop reason: SURG

## 2024-04-28 RX ORDER — HYDROMORPHONE HYDROCHLORIDE 1 MG/ML
0.4 INJECTION, SOLUTION INTRAMUSCULAR; INTRAVENOUS; SUBCUTANEOUS
Status: DISCONTINUED | OUTPATIENT
Start: 2024-04-28 | End: 2024-04-28 | Stop reason: HOSPADM

## 2024-04-28 RX ORDER — SODIUM CHLORIDE, SODIUM LACTATE, POTASSIUM CHLORIDE, CALCIUM CHLORIDE 600; 310; 30; 20 MG/100ML; MG/100ML; MG/100ML; MG/100ML
INJECTION, SOLUTION INTRAVENOUS CONTINUOUS
Status: DISCONTINUED | OUTPATIENT
Start: 2024-04-28 | End: 2024-04-28 | Stop reason: HOSPADM

## 2024-04-28 RX ORDER — IODIXANOL 270 MG/ML
INJECTION, SOLUTION INTRAVASCULAR
Status: DISCONTINUED | OUTPATIENT
Start: 2024-04-28 | End: 2024-04-28 | Stop reason: HOSPADM

## 2024-04-28 RX ORDER — HYDROMORPHONE HYDROCHLORIDE 1 MG/ML
0.2 INJECTION, SOLUTION INTRAMUSCULAR; INTRAVENOUS; SUBCUTANEOUS
Status: DISCONTINUED | OUTPATIENT
Start: 2024-04-28 | End: 2024-04-28 | Stop reason: HOSPADM

## 2024-04-28 RX ORDER — HALOPERIDOL 5 MG/ML
1 INJECTION INTRAMUSCULAR
Status: DISCONTINUED | OUTPATIENT
Start: 2024-04-28 | End: 2024-04-28 | Stop reason: HOSPADM

## 2024-04-28 RX ORDER — DEXAMETHASONE SODIUM PHOSPHATE 4 MG/ML
INJECTION, SOLUTION INTRA-ARTICULAR; INTRALESIONAL; INTRAMUSCULAR; INTRAVENOUS; SOFT TISSUE PRN
Status: DISCONTINUED | OUTPATIENT
Start: 2024-04-28 | End: 2024-04-28 | Stop reason: SURG

## 2024-04-28 RX ORDER — OXYCODONE HCL 5 MG/5 ML
10 SOLUTION, ORAL ORAL
Status: COMPLETED | OUTPATIENT
Start: 2024-04-28 | End: 2024-04-28

## 2024-04-28 RX ORDER — ONDANSETRON 2 MG/ML
INJECTION INTRAMUSCULAR; INTRAVENOUS PRN
Status: DISCONTINUED | OUTPATIENT
Start: 2024-04-28 | End: 2024-04-28 | Stop reason: SURG

## 2024-04-28 RX ORDER — BUPIVACAINE HYDROCHLORIDE AND EPINEPHRINE 5; 5 MG/ML; UG/ML
INJECTION, SOLUTION EPIDURAL; INTRACAUDAL; PERINEURAL
Status: DISCONTINUED | OUTPATIENT
Start: 2024-04-28 | End: 2024-04-28 | Stop reason: HOSPADM

## 2024-04-28 RX ORDER — EPHEDRINE SULFATE 50 MG/ML
INJECTION, SOLUTION INTRAVENOUS PRN
Status: DISCONTINUED | OUTPATIENT
Start: 2024-04-28 | End: 2024-04-28 | Stop reason: SURG

## 2024-04-28 RX ADMIN — PROPOFOL 100 MG: 10 INJECTION, EMULSION INTRAVENOUS at 09:37

## 2024-04-28 RX ADMIN — ROCURONIUM BROMIDE 10 MG: 50 INJECTION, SOLUTION INTRAVENOUS at 09:58

## 2024-04-28 RX ADMIN — FENTANYL CITRATE 50 MCG: 50 INJECTION, SOLUTION INTRAMUSCULAR; INTRAVENOUS at 09:29

## 2024-04-28 RX ADMIN — CEFOTETAN DISODIUM 2 G: 2 INJECTION, POWDER, FOR SOLUTION INTRAMUSCULAR; INTRAVENOUS at 09:48

## 2024-04-28 RX ADMIN — DEXAMETHASONE SODIUM PHOSPHATE 4 MG: 4 INJECTION INTRA-ARTICULAR; INTRALESIONAL; INTRAMUSCULAR; INTRAVENOUS; SOFT TISSUE at 09:42

## 2024-04-28 RX ADMIN — FENTANYL CITRATE 50 MCG: 50 INJECTION, SOLUTION INTRAMUSCULAR; INTRAVENOUS at 10:20

## 2024-04-28 RX ADMIN — PROPOFOL 25 MG: 10 INJECTION, EMULSION INTRAVENOUS at 11:12

## 2024-04-28 RX ADMIN — ROCURONIUM BROMIDE 10 MG: 50 INJECTION, SOLUTION INTRAVENOUS at 10:00

## 2024-04-28 RX ADMIN — OXYCODONE HYDROCHLORIDE 10 MG: 5 SOLUTION ORAL at 12:03

## 2024-04-28 RX ADMIN — FENTANYL CITRATE 50 MCG: 50 INJECTION, SOLUTION INTRAMUSCULAR; INTRAVENOUS at 09:58

## 2024-04-28 RX ADMIN — SUGAMMADEX 200 MG: 100 INJECTION, SOLUTION INTRAVENOUS at 11:12

## 2024-04-28 RX ADMIN — ACETAMINOPHEN 1000 MG: 500 TABLET, FILM COATED ORAL at 08:36

## 2024-04-28 RX ADMIN — LIDOCAINE HYDROCHLORIDE 80 MG: 20 INJECTION, SOLUTION EPIDURAL; INFILTRATION; INTRACAUDAL at 09:37

## 2024-04-28 RX ADMIN — CEFTRIAXONE SODIUM 2000 MG: 10 INJECTION, POWDER, FOR SOLUTION INTRAVENOUS at 05:58

## 2024-04-28 RX ADMIN — EPHEDRINE SULFATE 10 MG: 50 INJECTION, SOLUTION INTRAVENOUS at 09:48

## 2024-04-28 RX ADMIN — FENTANYL CITRATE 50 MCG: 50 INJECTION, SOLUTION INTRAMUSCULAR; INTRAVENOUS at 11:28

## 2024-04-28 RX ADMIN — METOPROLOL TARTRATE 1 MG: 5 INJECTION INTRAVENOUS at 10:49

## 2024-04-28 RX ADMIN — LEVOTHYROXINE SODIUM 100 MCG: 0.1 TABLET ORAL at 05:57

## 2024-04-28 RX ADMIN — METOPROLOL TARTRATE 1 MG: 5 INJECTION INTRAVENOUS at 10:17

## 2024-04-28 RX ADMIN — METOPROLOL SUCCINATE 25 MG: 25 TABLET, FILM COATED, EXTENDED RELEASE ORAL at 17:39

## 2024-04-28 RX ADMIN — OXYCODONE HYDROCHLORIDE 10 MG: 10 TABLET ORAL at 15:23

## 2024-04-28 RX ADMIN — ONDANSETRON 4 MG: 2 INJECTION INTRAMUSCULAR; INTRAVENOUS at 11:03

## 2024-04-28 RX ADMIN — SENNOSIDES AND DOCUSATE SODIUM 2 TABLET: 50; 8.6 TABLET ORAL at 17:31

## 2024-04-28 RX ADMIN — METOPROLOL TARTRATE 1 MG: 5 INJECTION INTRAVENOUS at 10:22

## 2024-04-28 RX ADMIN — SODIUM CHLORIDE, POTASSIUM CHLORIDE, SODIUM LACTATE AND CALCIUM CHLORIDE: 600; 310; 30; 20 INJECTION, SOLUTION INTRAVENOUS at 09:19

## 2024-04-28 RX ADMIN — AMLODIPINE BESYLATE 5 MG: 5 TABLET ORAL at 05:57

## 2024-04-28 RX ADMIN — METOPROLOL SUCCINATE 25 MG: 25 TABLET, FILM COATED, EXTENDED RELEASE ORAL at 05:57

## 2024-04-28 RX ADMIN — EPHEDRINE SULFATE 5 MG: 50 INJECTION, SOLUTION INTRAVENOUS at 10:06

## 2024-04-28 RX ADMIN — EPHEDRINE SULFATE 5 MG: 50 INJECTION, SOLUTION INTRAVENOUS at 10:03

## 2024-04-28 RX ADMIN — ROCURONIUM BROMIDE 40 MG: 50 INJECTION, SOLUTION INTRAVENOUS at 09:37

## 2024-04-28 ASSESSMENT — ENCOUNTER SYMPTOMS
MYALGIAS: 0
SHORTNESS OF BREATH: 0
DIZZINESS: 0
VOMITING: 0
FEVER: 0
DEPRESSION: 0
ABDOMINAL PAIN: 1
NAUSEA: 0
CHILLS: 0
HEADACHES: 0

## 2024-04-28 ASSESSMENT — PAIN SCALES - GENERAL: PAIN_LEVEL: 2

## 2024-04-28 ASSESSMENT — PATIENT HEALTH QUESTIONNAIRE - PHQ9
2. FEELING DOWN, DEPRESSED, IRRITABLE, OR HOPELESS: NOT AT ALL
1. LITTLE INTEREST OR PLEASURE IN DOING THINGS: NOT AT ALL
SUM OF ALL RESPONSES TO PHQ9 QUESTIONS 1 AND 2: 0

## 2024-04-28 ASSESSMENT — PAIN DESCRIPTION - PAIN TYPE
TYPE: SURGICAL PAIN
TYPE: ACUTE PAIN;SURGICAL PAIN
TYPE: ACUTE PAIN;SURGICAL PAIN
TYPE: ACUTE PAIN
TYPE: SURGICAL PAIN
TYPE: SURGICAL PAIN
TYPE: ACUTE PAIN;SURGICAL PAIN

## 2024-04-28 NOTE — ASSESSMENT & PLAN NOTE
Admit to:    Orthopedic/Med-Surg floor since no major co-morbidities.     Cardiovascular:   Patient does not have history of CHF  Pre-op EKG: Yes    Pulmonary:  Incentive Spirometer    GI:   No history of cirrhosis. Standard bowel regimen. Hold for loose stools.    Musculoskeletal:   Check 25 OH vitamin D level. If 31-40 pg/mL, consider starting vitamin D3 1000 IU PO daily. If 20-30 pg/mL, consider vitamin D3 2000 IU PO daily. If <20 pg/mL, vitamin D2 50,000 IU weekly x 8 weeks then 2000 IU PO daily.    Neurologic:   Pain Control: Neuro checks every 4 hours  Avoid fentanyl (short-acting)  Acetaminophen 1000 mg PO TID; 650 mg PO TID if liver problems    Hematologic:  Plan on pharmacologic DVT prophylaxis post operative day #1. Hold for decreasing hemoglobin. Notify provider for hemoglobin less than 8.  Order preoperative type and cross.   If patient was on anticoagulation prior to arrival risks and benefits will be weighed by teams including surgery, hospitalist, geriatrics, and anesthesia for delaying surgery more than 24 hours.   On anticoagulation prior to arrival: No    Medical Assessment Risk:  Low    Surgical Risk:   Low

## 2024-04-28 NOTE — PROGRESS NOTES
Received report from night shift RN, pt care assumed. Pt is A&Ox4, denies any pain, VSS. Updated on POC, verbalized understanding. Pt left unit with pt transport in stable condition to pre op.

## 2024-04-28 NOTE — ANESTHESIA PREPROCEDURE EVALUATION
Case: [id number] Date/Time: 04/28/24 0930    Procedures:       CHOLECYSTECTOMY, LAPAROSCOPIC (Abdomen)      CHOLANGIOGRAM    Anesthesia type: General    Location: [place] [address] / SURGERY [facility]    Surgeons: Tomas Rodrigues M.D.          85 yo male  Admitted with abd pain  Cholelithiasis and dialated duct    P Med Hx:  A.Fib  HTN  Hypothyroid  Elevated LFT's    P Surg Hx:  None listed    Non-smoker    NPO  Relevant Problems   CARDIAC   (positive) AF (atrial fibrillation) (HCC)   (positive) Primary hypertension      ENDO   (positive) Hypothyroidism       Physical Exam    Airway   Mallampati: II  TM distance: >3 FB  Neck ROM: full       Cardiovascular - normal exam  Rhythm: regular  Rate: normal  (-) murmur     Dental - normal exam           Pulmonary - normal exam  Breath sounds clear to auscultation     Abdominal    Neurological - normal exam                   Anesthesia Plan    ASA 2       Plan - general       Airway plan will be ETT          Induction: intravenous    Postoperative Plan: Postoperative administration of opioids is intended.    Pertinent diagnostic labs and testing reviewed    Informed Consent:    Anesthetic plan and risks discussed with patient.    Use of blood products discussed with: patient whom consented to blood products.

## 2024-04-28 NOTE — CARE PLAN
The patient is Stable - Low risk of patient condition declining or worsening    Shift Goals  Clinical Goals: NPO, surgery, pain management  Patient Goals: go home  Family Goals: none present    Progress made toward(s) clinical / shift goals:      Patient is not progressing towards the following goals:      Problem: Pain - Standard  Goal: Alleviation of pain or a reduction in pain to the patient’s comfort goal  Description: Target End Date:  Prior to discharge or change in level of care    Document on Vitals flowsheet    1.  Document pain using the appropriate pain scale per order or unit policy  2.  Educate and implement non-pharmacologic comfort measures (i.e. relaxation, distraction, massage, cold/heat therapy, etc.)  3.  Pain management medications as ordered  4.  Reassess pain after pain med administration per policy  5.  If opiods administered assess patient's response to pain medication is appropriate per POSS sedation scale  6.  Follow pain management plan developed in collaboration with patient and interdisciplinary team (including palliative care or pain specialists if applicable)  Outcome: Not Progressing  Note: Pt is A&Ox4, VSS on RA. Lap. Maura done, 3 lap sites noted on pt's abdomen. Dng CDI, no drainage noted. Pt c/o abdominal pain 9/10, PRN oxy 10 mg administered. Pt will report tolerable pain with ambulation. Denies N/V, abdomen is tender, bowel sounds active x 4qs. Reinforced the use of call light when assistance needed, pt verbalized understanding.

## 2024-04-28 NOTE — ANESTHESIA TIME REPORT
Anesthesia Start and Stop Event Times       Date Time Event    4/28/2024 0842 Ready for Procedure     0928 Anesthesia Start     1131 Anesthesia Stop          Responsible Staff  04/28/24      Name Role Begin End    Dwain Dunbar M.D. Anesth 0928 1131          Overtime Reason:  no overtime (within assigned shift)    Comments:                                                           Detail Level: Detailed Spironolactone Counseling: Patient advised regarding risks of diarrhea, abdominal pain, hyperkalemia, birth defects (for female patients), liver toxicity and renal toxicity. The patient may need blood work to monitor liver and kidney function and potassium levels while on therapy. The patient verbalized understanding of the proper use and possible adverse effects of spironolactone.  All of the patient's questions and concerns were addressed. Azelaic Acid Pregnancy And Lactation Text: This medication is considered safe during pregnancy and breast feeding. Topical Clindamycin Counseling: Patient counseled that this medication may cause skin irritation or allergic reactions.  In the event of skin irritation, the patient was advised to reduce the amount of the drug applied or use it less frequently.   The patient verbalized understanding of the proper use and possible adverse effects of clindamycin.  All of the patient's questions and concerns were addressed. Isotretinoin Pregnancy And Lactation Text: This medication is Pregnancy Category X and is considered extremely dangerous during pregnancy. It is unknown if it is excreted in breast milk. Sarecycline Counseling: Patient advised regarding possible photosensitivity and discoloration of the teeth, skin, lips, tongue and gums.  Patient instructed to avoid sunlight, if possible.  When exposed to sunlight, patients should wear protective clothing, sunglasses, and sunscreen.  The patient was instructed to call the office immediately if the following severe adverse effects occur:  hearing changes, easy bruising/bleeding, severe headache, or vision changes.  The patient verbalized understanding of the proper use and possible adverse effects of sarecycline.  All of the patient's questions and concerns were addressed. Aklief Pregnancy And Lactation Text: It is unknown if this medication is safe to use during pregnancy.  It is unknown if this medication is excreted in breast milk.  Breastfeeding women should use the topical cream on the smallest area of the skin for the shortest time needed while breastfeeding.  Do not apply to nipple and areola. Tazorac Counseling:  Patient advised that medication is irritating and drying.  Patient may need to apply sparingly and wash off after an hour before eventually leaving it on overnight.  The patient verbalized understanding of the proper use and possible adverse effects of tazorac.  All of the patient's questions and concerns were addressed. Erythromycin Pregnancy And Lactation Text: This medication is Pregnancy Category B and is considered safe during pregnancy. It is also excreted in breast milk. Birth Control Pills Counseling: Birth Control Pill Counseling: I discussed with the patient the potential side effects of OCPs including but not limited to increased risk of stroke, heart attack, thrombophlebitis, deep venous thrombosis, hepatic adenomas, breast changes, GI upset, headaches, and depression.  The patient verbalized understanding of the proper use and possible adverse effects of OCPs. All of the patient's questions and concerns were addressed. Winlevi Pregnancy And Lactation Text: This medication is considered safe during pregnancy and breastfeeding. Minocycline Counseling: Patient advised regarding possible photosensitivity and discoloration of the teeth, skin, lips, tongue and gums.  Patient instructed to avoid sunlight, if possible.  When exposed to sunlight, patients should wear protective clothing, sunglasses, and sunscreen.  The patient was instructed to call the office immediately if the following severe adverse effects occur:  hearing changes, easy bruising/bleeding, severe headache, or vision changes.  The patient verbalized understanding of the proper use and possible adverse effects of minocycline.  All of the patient's questions and concerns were addressed. Tetracycline Pregnancy And Lactation Text: This medication is Pregnancy Category D and not consider safe during pregnancy. It is also excreted in breast milk. Topical Retinoid counseling:  Patient advised to apply a pea-sized amount only at bedtime and wait 30 minutes after washing their face before applying.  If too drying, patient may add a non-comedogenic moisturizer. The patient verbalized understanding of the proper use and possible adverse effects of retinoids.  All of the patient's questions and concerns were addressed. Topical Sulfur Applications Pregnancy And Lactation Text: This medication is Pregnancy Category C and has an unknown safety profile during pregnancy. It is unknown if this topical medication is excreted in breast milk. Doxycycline Pregnancy And Lactation Text: This medication is Pregnancy Category D and not consider safe during pregnancy. It is also excreted in breast milk but is considered safe for shorter treatment courses. Bactrim Counseling:  I discussed with the patient the risks of sulfa antibiotics including but not limited to GI upset, allergic reaction, drug rash, diarrhea, dizziness, photosensitivity, and yeast infections.  Rarely, more serious reactions can occur including but not limited to aplastic anemia, agranulocytosis, methemoglobinemia, blood dyscrasias, liver or kidney failure, lung infiltrates or desquamative/blistering drug rashes. Dapsone Pregnancy And Lactation Text: This medication is Pregnancy Category C and is not considered safe during pregnancy or breast feeding. Azithromycin Counseling:  I discussed with the patient the risks of azithromycin including but not limited to GI upset, allergic reaction, drug rash, diarrhea, and yeast infections. High Dose Vitamin A Counseling: Side effects reviewed, pt to contact office should one occur. Isotretinoin Counseling: Patient should get monthly blood tests, not donate blood, not drive at night if vision affected, not share medication, and not undergo elective surgery for 6 months after tx completed. Side effects reviewed, pt to contact office should one occur. Spironolactone Pregnancy And Lactation Text: This medication can cause feminization of the male fetus and should be avoided during pregnancy. The active metabolite is also found in breast milk. Benzoyl Peroxide Counseling: Patient counseled that medicine may cause skin irritation and bleach clothing.  In the event of skin irritation, the patient was advised to reduce the amount of the drug applied or use it less frequently.   The patient verbalized understanding of the proper use and possible adverse effects of benzoyl peroxide.  All of the patient's questions and concerns were addressed. Topical Clindamycin Pregnancy And Lactation Text: This medication is Pregnancy Category B and is considered safe during pregnancy. It is unknown if it is excreted in breast milk. Include Pregnancy/Lactation Warning?: No Birth Control Pills Pregnancy And Lactation Text: This medication should be avoided if pregnant and for the first 30 days post-partum. Azelaic Acid Counseling: Patient counseled that medicine may cause skin irritation and to avoid applying near the eyes.  In the event of skin irritation, the patient was advised to reduce the amount of the drug applied or use it less frequently.   The patient verbalized understanding of the proper use and possible adverse effects of azelaic acid.  All of the patient's questions and concerns were addressed. Tazorac Pregnancy And Lactation Text: This medication is not safe during pregnancy. It is unknown if this medication is excreted in breast milk. Bactrim Pregnancy And Lactation Text: This medication is Pregnancy Category D and is known to cause fetal risk.  It is also excreted in breast milk. Erythromycin Counseling:  I discussed with the patient the risks of erythromycin including but not limited to GI upset, allergic reaction, drug rash, diarrhea, increase in liver enzymes, and yeast infections. Azithromycin Pregnancy And Lactation Text: This medication is considered safe during pregnancy and is also secreted in breast milk. Aklief counseling:  Patient advised to apply a pea-sized amount only at bedtime and wait 30 minutes after washing their face before applying.  If too drying, patient may add a non-comedogenic moisturizer.  The most commonly reported side effects including irritation, redness, scaling, dryness, stinging, burning, itching, and increased risk of sunburn.  The patient verbalized understanding of the proper use and possible adverse effects of retinoids.  All of the patient's questions and concerns were addressed. Topical Retinoid Pregnancy And Lactation Text: This medication is Pregnancy Category C. It is unknown if this medication is excreted in breast milk. Winlevi Counseling:  I discussed with the patient the risks of topical clascoterone including but not limited to erythema, scaling, itching, and stinging. Patient voiced their understanding. Tetracycline Counseling: Patient counseled regarding possible photosensitivity and increased risk for sunburn.  Patient instructed to avoid sunlight, if possible.  When exposed to sunlight, patients should wear protective clothing, sunglasses, and sunscreen.  The patient was instructed to call the office immediately if the following severe adverse effects occur:  hearing changes, easy bruising/bleeding, severe headache, or vision changes.  The patient verbalized understanding of the proper use and possible adverse effects of tetracycline.  All of the patient's questions and concerns were addressed. Patient understands to avoid pregnancy while on therapy due to potential birth defects. Benzoyl Peroxide Pregnancy And Lactation Text: This medication is Pregnancy Category C. It is unknown if benzoyl peroxide is excreted in breast milk. Topical Sulfur Applications Counseling: Topical Sulfur Counseling: Patient counseled that this medication may cause skin irritation or allergic reactions.  In the event of skin irritation, the patient was advised to reduce the amount of the drug applied or use it less frequently.   The patient verbalized understanding of the proper use and possible adverse effects of topical sulfur application.  All of the patient's questions and concerns were addressed. Doxycycline Counseling:  Patient counseled regarding possible photosensitivity and increased risk for sunburn.  Patient instructed to avoid sunlight, if possible.  When exposed to sunlight, patients should wear protective clothing, sunglasses, and sunscreen.  The patient was instructed to call the office immediately if the following severe adverse effects occur:  hearing changes, easy bruising/bleeding, severe headache, or vision changes.  The patient verbalized understanding of the proper use and possible adverse effects of doxycycline.  All of the patient's questions and concerns were addressed. High Dose Vitamin A Pregnancy And Lactation Text: High dose vitamin A therapy is contraindicated during pregnancy and breast feeding. Dapsone Counseling: I discussed with the patient the risks of dapsone including but not limited to hemolytic anemia, agranulocytosis, rashes, methemoglobinemia, kidney failure, peripheral neuropathy, headaches, GI upset, and liver toxicity.  Patients who start dapsone require monitoring including baseline LFTs and weekly CBCs for the first month, then every month thereafter.  The patient verbalized understanding of the proper use and possible adverse effects of dapsone.  All of the patient's questions and concerns were addressed.

## 2024-04-28 NOTE — OP REPORT
DATE OF OPERATION:   4/28/2024     PREOPERATIVE DIAGNOSIS: Right upper quadrant pain, cholelithiasis, and acute cholecystitis.    POSTOPERATIVE DIAGNOSIS: Right upper quadrant pain, cholelithiasis, and acute cholecystitis.    PROCEDURE PERFORMED: Laparoscopic cholecystectomy and attempted cholangiogram    SURGEON:    Tomas Rodrigues M.D.    ASSISTANT:    ISREAL [place].     ANESTHESIOLOGIST:  Dwain Dunbar M.D.    ANESTHESIA:   General endotracheal anesthesia.    ASA CLASSIFICATION:  II.    INDICATIONS: The patient is a 84 year-old elderly man with clinical and radiographic findings of Right upper quadrant pain, cholelithiasis, and acute cholecystitis. He is taken to the operating room for a laparoscopic cholecystectomy.     The nature of the surgical procedure warranted additional skilled operative assistance from an Advanced Registered Nurse Practitioner (ARNP). The assistant was present during the entire operation. The surgical assistant performed the following: provided assistance with optimal surgical exposure of the operative field, provided high complexity, subspecialty decision making input, operated the camera for the laparoscopic portion of the procedure, assisted with the surgical resection, and assisted with the fascial closure.     FINDINGS:  Dense omental adhesions,  intraabdominal adhesions right upper quadrant and right lobe of liver. Gallbladder wall thickening and acute inflammation. Fatty appearing liver. Gallbladder mostly intrahepatic.    WOUND CLASSIFICATION:  Class III, Contaminated.    SPECIMEN:    Gallbladder.    ESTIMATED BLOOD LOSS:  30 mL.    PROCEDURE: Following informed consent consent, the patient was properly identified, taken to the operating room and placed in supine position where general endotracheal anesthesia was administered. Intravenous antibiotics were administered by the anesthesiologist in correct time interval. The patient voided prior to surgery. A  urinary catheter was not placed. Sequential compression devices were employed. The abdomen was prepped and draped into a sterile field. A timeout was conducted with the full attention and participation of all operating room personnel.    Marcaine 0.5% was used to infiltrate the port sites. A supraumbilical incision was made and the subcutaneous tissues spread bluntly. The  fascia was incised and a 5 mm port without a trocar was inserted. Carbon dioxide pneumoperitoneum was instilled.  A 5 mm 30 degree lens and camera was passed into the peritoneal cavity. An 11 mm port was placed in the epigastric midline under direct vision. Two 5 mm right subcostal ports were placed under direct vision.     The gallbladder was identified and elevated. Dissection was carried out to completely expose and delineate the hepatocystic triangle. The critical view was achieved definitively identifying the single cystic duct and single cystic artery entering the gallbladder. These structures were multiply clipped proximally, once distally and divided. The gallbladder was dissected free from the undersurface of the liver using electrocautery and placed within a laparoscopic specimen retrieval bag. The gallbladder was delivered intact from the abdominal cavity and submitted for pathology.  The gallbladder fossa was irrigated. All excess irrigant was evacuated from the abdominal cavity.  The gallbladder fossa was inspected. Hemostasis was satisfactory. The gallbladder fossa was inspected. Hemostasis was controlled with electrocautery and application of SURGICEL® Powder Absorbable Hemostat. A 10 mm flat fluted drain was placed within the gallbladder fossa and secured to the skin at the right lateral port site with a 2-0 ETHILON® Nylon suture. The drain was connected to closed bulb suction. The ports were removed and the abdomen desufflated. The enlarged epigastric port site fascia was approximated using a trocar site closure device with a 0  VICRYL® Plus Antibacterial subcuticular suture. The port sites were closed with interrupted 4-0 VICRYL® Plus Antibacterial subcuticular sutures. A DERMABOND ADVANCED® Topical Skin Adhesive dressing was applied.    The patient tolerated the procedure well, and there were no apparent complications. All sponge, needle, and instrument counts were correct on 2 separate occasions. The patient was awakened, extubated, and transferred to  to the post anesthesia care unit (PACU) in satisfactory condition.         ____________________________________     Tomas Rodrigues M.D.    DD: 4/28/2024  11:29 AM

## 2024-04-28 NOTE — CARE PLAN
The patient is Watcher - Medium risk of patient condition declining or worsening    Shift Goals  Clinical Goals: Will place patient on NPO post mn for Sx olu.  Patient Goals: go home  Family Goals: none present    Progress made toward(s) clinical / shift goals: Patient schedule for Lap. Maura. with intra-op cholangiogram at 0715. Patient denies any pain and discomfort. Instructed patient on NPO post midnight.    Patient is not progressing towards the following goals: N/A

## 2024-04-28 NOTE — PROGRESS NOTES
Hospital Medicine Daily Progress Note    Date of Service  4/28/2024    Chief Complaint  Victoriano Vasquez is a 84 y.o. male admitted 4/26/2024 with abdominal pain     Hospital Course  84 y.o. male with a history of hypothyroidism, hypertension, who presented 4/26/2024 with abdominal pain.  Patient reported the pain started yesterday, in the middle and lower abdominal area, more on the right side.  Denies nausea vomiting or diarrhea, constipation.      nA 137, K 3.5, , AST/ALT 93/49, total bili 1.3; Wbc 8.8, Hb 16.4, lipase 52  HR 86, /67, RA; Ua negative for UTI. RUQ U/S showed  biliary ductal dilatation. Ct ABD showed Gallbladder distention with calcified gallstones,  Intrahepatic biliary ductal dilatation       Interval Problem Update  4/27 MRCP showed normal CBD, cholelithiasis and mild gallbladder wall thickening without pericholecystic fluid, possible early cholecystitis   WBC 11.5 this morning. Vitals stable   Liver enzymes worsening, , , t bili 3.6   Patient reports he is mostly having pain with movement in the RUQ. Denies any alcohol history. No fever, chills    Discussed with GI recommended HIDA scan which is pending.     4/28 Vitals stable   AST 64, , tbili 1.4   HIDA positive for acute cholecystitis   Discussed with surgery plan for OR today   Perioperative risk assessment performed below     I have discussed this patient's plan of care and discharge plan at IDT rounds today with Case Management, Nursing, Nursing leadership, and other members of the IDT team.    Consultants/Specialty  General surgery     Code Status  Full Code    Disposition  The patient is not medically cleared for discharge to home or a post-acute facility.  Anticipate discharge to: home with close outpatient follow-up    I have placed the appropriate orders for post-discharge needs.    Review of Systems  Review of Systems   Constitutional:  Negative for chills and fever.   Respiratory:  Negative for  shortness of breath.    Cardiovascular:  Negative for chest pain.   Gastrointestinal:  Positive for abdominal pain. Negative for nausea and vomiting.   Musculoskeletal:  Negative for myalgias.   Skin:  Negative for rash.   Neurological:  Negative for dizziness and headaches.   Psychiatric/Behavioral:  Negative for depression.    All other systems reviewed and are negative.       Physical Exam  Temp:  [36.1 °C (97 °F)-36.9 °C (98.5 °F)] 36.3 °C (97.4 °F)  Pulse:  [61-72] 72  Resp:  [16-18] 16  BP: (121-146)/(50-65) 146/65  SpO2:  [95 %-97 %] 95 %    Physical Exam  Vitals and nursing note reviewed.   Constitutional:       General: He is not in acute distress.     Appearance: Normal appearance. He is not ill-appearing.   HENT:      Head: Normocephalic and atraumatic.   Eyes:      Conjunctiva/sclera: Conjunctivae normal.      Pupils: Pupils are equal, round, and reactive to light.   Cardiovascular:      Rate and Rhythm: Normal rate and regular rhythm.      Pulses: Normal pulses.   Pulmonary:      Effort: Pulmonary effort is normal. No respiratory distress.      Breath sounds: Normal breath sounds. No wheezing.   Abdominal:      General: Abdomen is flat. There is no distension.      Palpations: Abdomen is soft.      Tenderness: There is abdominal tenderness.   Musculoskeletal:         General: No swelling. Normal range of motion.      Cervical back: Normal range of motion and neck supple.   Skin:     General: Skin is warm and dry.      Findings: No rash.   Neurological:      General: No focal deficit present.      Mental Status: He is alert and oriented to person, place, and time.      Cranial Nerves: No cranial nerve deficit.   Psychiatric:         Mood and Affect: Mood normal.         Behavior: Behavior normal.         Fluids    Intake/Output Summary (Last 24 hours) at 4/28/2024 0905  Last data filed at 4/27/2024 2200  Gross per 24 hour   Intake 180 ml   Output --   Net 180 ml       Laboratory  Recent Labs      04/26/24  0359 04/27/24  0200 04/28/24  0138   WBC 8.8 11.5* 9.2   RBC 5.39 4.84 4.84   HEMOGLOBIN 16.4 14.7 14.6   HEMATOCRIT 49.1 42.6 43.1   MCV 91.1 88.0 89.0   MCH 30.4 30.4 30.2   MCHC 33.4 34.5 33.9   RDW 53.7* 51.9* 53.4*   PLATELETCT 259 199 199   MPV 10.8 11.1 11.0     Recent Labs     04/26/24  0359 04/27/24  0200 04/28/24  0138   SODIUM 137 139 136   POTASSIUM 3.5* 4.0 3.8   CHLORIDE 103 106 101   CO2 20 21 22   GLUCOSE 114* 83 73   BUN 19 15 14   CREATININE 0.83 0.76 0.76   CALCIUM 9.4 8.8 8.4*                   Imaging  NM-HEPATOBILIARY SCAN   Final Result      1.  Nonvisualization of the gallbladder initially and after morphine administration which would be consistent with acute cholecystitis.   2.  Persistent of activity within the liver throughout the examination with delayed activity in the bile duct consistent with underlying hepatocellular disease.      [id number]   Final Result      1.  Normal CBD size for age. No intrahepatic biliary dilatation.   2.  Allowing for limitations of motion degradation, no CBD stones detected.   3.  Cholelithiasis and mild gallbladder wall thickening, without pericholecystic fluid. Early acute cholecystitis can be considered in the appropriate clinical settings.   4.  L2 compression fracture, new since 2015. Please correlate with back pain.         US-RUQ   Final Result         1. Hepatomegaly.   2. Cholelithiasis.   3. There is biliary ductal dilatation. The distal common bile duct and pancreas are obscured by bowel gas. This could be further assessed with ERCP or MRCP.      CT-ABDOMEN-PELVIS WITH   Final Result         1.  Gallbladder distention with calcified gallstones, otherwise unremarkable.   2.  Intrahepatic biliary ductal dilatation, consider causes of biliary obstruction with additional workup as clinically appropriate.   3.  Diverticulosis   4.  Atherosclerosis   5.  Fat-containing bilateral inguinal hernias      DX-PORTABLE FLUORO > 1 HOUR    (Results  Pending)   [id number] VIEW    (Results Pending)        Assessment/Plan  * Abdominal pain- (present on admission)  Assessment & Plan  Abdominal pain for 1 day, more on the RUQ area and RLQ  No nausea vomiting or diarrhea  Trop 25>21; EKG afib, no acute ischemic STT changes    Elevated liver enzymes, AST/ALT 93/49, total bili 1.3  lipase 52      RUQ U/S showed biliary ductal dilatation. The distal common bile duct and pancreas are obscured by bowel gas.   Ct ABD showed gallbladder distention with calcified gallstones, Intrahepatic biliary ductal dilatation  MRCP possible early cholecystitis  -started rocephin     HIDA positive   General surgery consulted     Acute cholecystitis- (present on admission)  Assessment & Plan  HIDA positive for acute cholecystitis  Continue IV antibiotics  Surgery consulted, plan for cholecystectomy today    Encounter for perioperative consultation  Assessment & Plan  Admit to:    Orthopedic/Med-Surg floor since no major co-morbidities.     Cardiovascular:   Patient does not have history of CHF  Pre-op EKG: Yes    Pulmonary:  Incentive Spirometer    GI:   No history of cirrhosis. Standard bowel regimen. Hold for loose stools.    Musculoskeletal:   Check 25 OH vitamin D level. If 31-40 pg/mL, consider starting vitamin D3 1000 IU PO daily. If 20-30 pg/mL, consider vitamin D3 2000 IU PO daily. If <20 pg/mL, vitamin D2 50,000 IU weekly x 8 weeks then 2000 IU PO daily.    Neurologic:   Pain Control: Neuro checks every 4 hours  Avoid fentanyl (short-acting)  Acetaminophen 1000 mg PO TID; 650 mg PO TID if liver problems    Hematologic:  Plan on pharmacologic DVT prophylaxis post operative day #1. Hold for decreasing hemoglobin. Notify provider for hemoglobin less than 8.  Order preoperative type and cross.   If patient was on anticoagulation prior to arrival risks and benefits will be weighed by teams including surgery, hospitalist, geriatrics, and anesthesia for delaying surgery more than 24  hours.   On anticoagulation prior to arrival: No    Medical Assessment Risk:  Low    Surgical Risk:   Low     Elevated bilirubin  Assessment & Plan  New Total bili 3.1 as of this morning    MRCP negative yesterday   Discussed with GI, will get HIDA scan       Hypokalemia  Assessment & Plan  Replaced  I ordered mag and Phos    AF (atrial fibrillation) (HCC)  Assessment & Plan  EKG at admission showed A-fib, with rate controlled.  No ischemic ST-T changes.   Continue home metoprolol  [id number] score 3 (age and hypertension)  Patient is not on home OAC.  Need to discussed with the patient after MRCP done and no procedure planned    Primary hypertension  Assessment & Plan  I resumed the home amlodipine and metoprolol    Hypothyroidism  Assessment & Plan  I resumed the home levothyroxine  I ordered a TSH    Elevated liver enzymes  Assessment & Plan  AST/ALT 93/49, total bili 1.3  Ultrasound and CT showed bile duct dilation and gallbladder stones  MRCP pending  I ordered a.m. CMP to monitor    ACP (advance care planning)  Assessment & Plan  Aox4. patient admitted for abdominal pain, possible choledocholithiasis.  Advanced age of 84.  I discussed the advanced care planning with the patient and the wife at bedside.  We discussed the diagnosis, treatments, benefits and risks.  We discussed the CODE STATUS including CPR and intubation.  The patient is willing to stay full code.  ACP 16 minutes      Total time spent in chart review, at bedside with the patient, discussing with consultants, nursing and case management: 53 minutes    VTE prophylaxis: SCDs/TEDs    I have performed a physical exam and reviewed and updated ROS and Plan today (4/28/2024). In review of yesterday's note (4/27/2024), there are no changes except as documented above.

## 2024-04-28 NOTE — OR NURSING
1127- Pt arrives to PACU from OR on 6L of oxygen via mask. Report received. Dressing to abd CDI. Pt medicated for pain upon arrival.     1154- Pt declining further pain medication at this time. Tolerating sips of water.     1159- Attempt to call pt wife Breonna, no answer at this time.     1204- Pt medicated for pain per MAR.    1216- Report called to Edna HAWK.

## 2024-04-28 NOTE — ASSESSMENT & PLAN NOTE
HIDA positive for acute cholecystitis  Continue IV antibiotics  Surgery consulted, plan for cholecystectomy today

## 2024-04-28 NOTE — PROGRESS NOTES
PREOPERATIVE NOTE: I have seen and examined the patient today.  Critical physical examination findings, laboratory indices, and radiographic studies reviewed.  I recommend laparoscopic cholecystectomy, possible cholangiogram, possible open cholecystectomy.    The operative strategy was explained and reviewed. Alternatives discussed. Potential complications including, but not limited to, infection, bleeding, damage to adjacent structures, and anesthetic complications were discussed. Operative consent signed.        ____________________________________     Tomas Rodrigues M.D.    DD: 4/28/2024  7:50 AM

## 2024-04-28 NOTE — ANESTHESIA POSTPROCEDURE EVALUATION
Patient: Victoriano Vasquez    Procedure Summary       Date: 04/28/24 Room / Location: [place] [address] / SURGERY [facility]    Anesthesia Start: 0928 Anesthesia Stop: 1131    Procedures:       CHOLECYSTECTOMY, LAPAROSCOPIC (Abdomen)      ATTEMPTED INTRA-OPERATIVE CHOLANGIOGRAM (Abdomen) Diagnosis: (acute cholecystitis and hyperbilirubinemia)    Surgeons: Tomas Rodrigues M.D. Responsible Provider: Dwain Dunbar M.D.    Anesthesia Type: general ASA Status: 2            Final Anesthesia Type: general  Last vitals  BP   Blood Pressure : 134/57    Temp   36.6 °C (97.9 °F)    Pulse   69   Resp   19    SpO2   97 %      Anesthesia Post Evaluation    Patient location during evaluation: PACU  Patient participation: complete - patient participated  Level of consciousness: awake and alert  Pain score: 2    Airway patency: patent  Anesthetic complications: no  Cardiovascular status: hemodynamically stable  Respiratory status: acceptable  Hydration status: euvolemic    PONV: none          No notable events documented.     Nurse Pain Score: 5 (NPRS)

## 2024-04-28 NOTE — HOSPITAL COURSE
84 y.o. male with a history of hypothyroidism, hypertension, who presented 4/26/2024 with RUQ abdominal pain.     In the ER Na 137, K 3.5, , AST/ALT 93/49, total bili 1.3; Wbc 8.8, Hb 16.4, lipase 52.   HR 86, /67, RA; Ua negative for UTI. RUQ U/S showed  biliary ductal dilatation. Ct ABD showed Gallbladder distention with calcified gallstones, intrahepatic biliary ductal dilatation. MRCP showed normal CBD, cholelithiasis and mild gallbladder wall thickening without pericholecystic fluid, possible early cholecystitis. Patient started on IV abx for concern for cholecystitis. Liver enzymes increased, , , t bili 3.6. Discussed with GI who recommended HIDA and if goes for surgery recommended IOC. HIDA positive for acute cholecystitis. General surgery consulted and patient taken for cholecystectomy on 4/28, TJ drain placed during surgery.      On admission patient noted to be in rate controlled afib on admission. Restarted on home metoprolol. Patient denies history of A-fib, echo showed EF preserved, moderate to severe aortic valve stenosis.  Discussed anticoagulation as [id number] score 4, patient agreeable started Eliquis.  Cardiology consulted recommended follow up in the outpatient clinic and charles on discharge.

## 2024-04-28 NOTE — ANESTHESIA PROCEDURE NOTES
Airway    Date/Time: 4/28/2024 9:37 AM    Performed by: Dwain Dunbar M.D.  Authorized by: Dwain Dunbar M.D.    Location:  OR  Urgency:  Elective  Indications for Airway Management:  Anesthesia      Spontaneous Ventilation: absent    Sedation Level:  Deep  Preoxygenated: Yes    Patient Position:  Sniffing  Final Airway Type:  Endotracheal airway  Final Endotracheal Airway:  ETT  Cuffed: Yes    Technique Used for Successful ETT Placement:  Direct laryngoscopy    Insertion Site:  Oral  Blade Type:  Carmen  Laryngoscope Blade/Videolaryngoscope Blade Size:  3  ETT Size (mm):  7.5  Measured from:  Lips  ETT to Lips (cm):  22  Placement Verified by: auscultation and capnometry    Cormack-Lehane Classification:  Grade I - full view of glottis  Number of Attempts at Approach:  1

## 2024-04-28 NOTE — CONSULTS
"    DATE OF CONSULTATION:  4/27/2024     REFERRING PHYSICIAN:   Felecia Bell D.O.     CONSULTING PHYSICIAN:  Jamie Lopez M.D.     REASON FOR CONSULTATION:  I have been asked by Dr. Bell to see the patient in surgical consultation for evaluation of cholecystitis.      HISTORY OF PRESENT ILLNESS: The patient is an 84 year-old  elderly man admitted to the hospital yesterday with abdominal pain. He reports a 6-hour history of severe right upper quadrant  abdominal pain. His pain has now resolved. The pain is associated with nausea and vomiting. He denies any food intolerance or temporal relationship between symptoms and eating.  He denies a family history of gallstones. The patient denies any recent or intercurrent illness. The patient denies any history of previous abdominal surgery.    PAST MEDICAL HISTORY:  has no past medical history on file.    PAST SURGICAL HISTORY:  has no past surgical history on file.    ALLERGIES: No Known Allergies    CURRENT MEDICATIONS:    Home Medications       Reviewed by Bora Chacko (Pharmacy Tech) on 04/26/24 at 1024  Med List Status: Complete     Medication Last Dose Status   amLODIPine (NORVASC) 5 MG Tab 4/25/2024 Active   levothyroxine (SYNTHROID) 100 MCG Tab 4/25/2024 Active   metoprolol SR (TOPROL XL) 25 MG TABLET SR 24 HR 4/25/2024 Active                FAMILY HISTORY: family history is not on file.    SOCIAL HISTORY:  reports that he has never smoked. He has never used smokeless tobacco. He reports that he does not drink alcohol and does not use drugs.    REVIEW OF SYSTEMS: Comprehensive review of systems is negative with the exception of the aforementioned HPI, PMH, and PSH bullets in accordance with CMS guidelines.    PHYSICAL EXAMINATION:      Constitutional:     Vital Signs: /50   Pulse 69   Temp 36.9 °C (98.5 °F) (Temporal)   Resp 17   Ht 1.727 m (5' 8\")   Wt 84.7 kg (186 lb 11.7 oz)   SpO2 96%    General Appearance: appears stated age, is " in no apparent distress.  HEENT: The pupils are equal, round, and reactive to light bilaterally. The extraocular muscles are intact bilaterally. The sclera are icteric. Nares and oropharynx are clear.   Neck: Supple. No adenopathy.  Respiratory:   Inspection: Unlabored respirations, no intercostal retractions, paradoxical motion, or accessory muscle use.   Auscultation: clear to auscultation.  Cardiovascular:   Inspection: The skin is warm, dry, and well purfused.  Auscultation: Regular rate and rhythm.   Peripheral Pulses: Normal.   Abdomen:  Inspection: Abdominal inspection reveals  no abdominal distension.   Palpation: Palpation is remarkable for mild tenderness in the right subcostal region. No abdominal wall hernias.  Extremities:   Examination of the upper and lower extremities demonstrates no cyanosis edema or clubbing.  Neurologic:   Alert & oriented to person, time and place. Normal motor function. Normal sensory function. No focal deficits noted.    LABORATORY VALUES:   Recent Labs     04/26/24  0359 04/27/24 0200   WBC 8.8 11.5*   RBC 5.39 4.84   HEMOGLOBIN 16.4 14.7   HEMATOCRIT 49.1 42.6   MCV 91.1 88.0   MCH 30.4 30.4   MCHC 33.4 34.5   RDW 53.7* 51.9*   PLATELETCT 259 199   MPV 10.8 11.1     Recent Labs     04/26/24  0359 04/27/24  0200   SODIUM 137 139   POTASSIUM 3.5* 4.0   CHLORIDE 103 106   CO2 20 21   GLUCOSE 114* 83   BUN 19 15   CREATININE 0.83 0.76   CALCIUM 9.4 8.8     Recent Labs     04/26/24  0359 04/27/24  0200   ASTSGOT 93* 134*   ALTSGPT 49 181*   TBILIRUBIN 1.3 3.6*   ALKPHOSPHAT 121* 143*   GLOBULIN 3.7* 2.8     IMAGING:   NM-HEPATOBILIARY SCAN   Final Result      1.  Nonvisualization of the gallbladder initially and after morphine administration which would be consistent with acute cholecystitis.   2.  Persistent of activity within the liver throughout the examination with delayed activity in the bile duct consistent with underlying hepatocellular disease.      [id number]   Final  Result      1.  Normal CBD size for age. No intrahepatic biliary dilatation.   2.  Allowing for limitations of motion degradation, no CBD stones detected.   3.  Cholelithiasis and mild gallbladder wall thickening, without pericholecystic fluid. Early acute cholecystitis can be considered in the appropriate clinical settings.   4.  L2 compression fracture, new since 2015. Please correlate with back pain.         US-RUQ   Final Result         1. Hepatomegaly.   2. Cholelithiasis.   3. There is biliary ductal dilatation. The distal common bile duct and pancreas are obscured by bowel gas. This could be further assessed with ERCP or MRCP.      CT-ABDOMEN-PELVIS WITH   Final Result         1.  Gallbladder distention with calcified gallstones, otherwise unremarkable.   2.  Intrahepatic biliary ductal dilatation, consider causes of biliary obstruction with additional workup as clinically appropriate.   3.  Diverticulosis   4.  Atherosclerosis   5.  Fat-containing bilateral inguinal hernias        ASSESSMENT AND PLAN:   The patient has Grade I (mild) acute cholecystitis and hyperbilirubinemia. Plan: Laparoscopic cholecystectomy and intraoperative cholangiogram.    The patient will be taken to the operating room tomorrow morning with Dr Rodrigues for a laparoscopic cholecystectomy and intraoperative cholangiogram.     DISPOSITION: Medical evaluation and admission. The patient was admitted to the Medical Service prior to surgical consultation. [facility] [place] Service will follow.         ____________________________________     Jamie Lopez M.D.    DD: 4/27/2024  6:18 PM

## 2024-04-29 ENCOUNTER — APPOINTMENT (OUTPATIENT)
Dept: CARDIOLOGY | Facility: MEDICAL CENTER | Age: 84
DRG: 419 | End: 2024-04-29
Attending: INTERNAL MEDICINE
Payer: MEDICARE

## 2024-04-29 PROBLEM — I35.0 NONRHEUMATIC AORTIC VALVE STENOSIS: Status: ACTIVE | Noted: 2024-04-29

## 2024-04-29 LAB
ALBUMIN SERPL BCP-MCNC: 3.6 G/DL (ref 3.2–4.9)
ALBUMIN/GLOB SERPL: 1.1 G/DL
ALP SERPL-CCNC: 169 U/L (ref 30–99)
ALT SERPL-CCNC: 124 U/L (ref 2–50)
ANION GAP SERPL CALC-SCNC: 16 MMOL/L (ref 7–16)
AST SERPL-CCNC: 81 U/L (ref 12–45)
BILIRUB SERPL-MCNC: 0.9 MG/DL (ref 0.1–1.5)
BUN SERPL-MCNC: 14 MG/DL (ref 8–22)
CALCIUM ALBUM COR SERPL-MCNC: 8.9 MG/DL (ref 8.5–10.5)
CALCIUM SERPL-MCNC: 8.6 MG/DL (ref 8.5–10.5)
CHLORIDE SERPL-SCNC: 99 MMOL/L (ref 96–112)
CO2 SERPL-SCNC: 18 MMOL/L (ref 20–33)
CREAT SERPL-MCNC: 0.87 MG/DL (ref 0.5–1.4)
EKG IMPRESSION: NORMAL
ERYTHROCYTE [DISTWIDTH] IN BLOOD BY AUTOMATED COUNT: 51.1 FL (ref 35.9–50)
GFR SERPLBLD CREATININE-BSD FMLA CKD-EPI: 84 ML/MIN/1.73 M 2
GLOBULIN SER CALC-MCNC: 3.3 G/DL (ref 1.9–3.5)
GLUCOSE SERPL-MCNC: 114 MG/DL (ref 65–99)
HCT VFR BLD AUTO: 44.9 % (ref 42–52)
HGB BLD-MCNC: 15.2 G/DL (ref 14–18)
LV EJECT FRACT  99904: 65
LV EJECT FRACT MOD 2C 99903: 64.26
LV EJECT FRACT MOD 4C 99902: 77.83
LV EJECT FRACT MOD BP 99901: 72.71
MCH RBC QN AUTO: 30 PG (ref 27–33)
MCHC RBC AUTO-ENTMCNC: 33.9 G/DL (ref 32.3–36.5)
MCV RBC AUTO: 88.6 FL (ref 81.4–97.8)
PATHOLOGY CONSULT NOTE: NORMAL
PLATELET # BLD AUTO: 233 K/UL (ref 164–446)
PMV BLD AUTO: 11.1 FL (ref 9–12.9)
POTASSIUM SERPL-SCNC: 4 MMOL/L (ref 3.6–5.5)
PROT SERPL-MCNC: 6.9 G/DL (ref 6–8.2)
RBC # BLD AUTO: 5.07 M/UL (ref 4.7–6.1)
SODIUM SERPL-SCNC: 133 MMOL/L (ref 135–145)
WBC # BLD AUTO: 11.2 K/UL (ref 4.8–10.8)

## 2024-04-29 PROCEDURE — 700102 HCHG RX REV CODE 250 W/ 637 OVERRIDE(OP): Performed by: STUDENT IN AN ORGANIZED HEALTH CARE EDUCATION/TRAINING PROGRAM

## 2024-04-29 PROCEDURE — 700111 HCHG RX REV CODE 636 W/ 250 OVERRIDE (IP): Performed by: INTERNAL MEDICINE

## 2024-04-29 PROCEDURE — 80053 COMPREHEN METABOLIC PANEL: CPT

## 2024-04-29 PROCEDURE — 93306 TTE W/DOPPLER COMPLETE: CPT

## 2024-04-29 PROCEDURE — 770006 HCHG ROOM/CARE - MED/SURG/GYN SEMI*

## 2024-04-29 PROCEDURE — 93005 ELECTROCARDIOGRAM TRACING: CPT | Performed by: NURSE PRACTITIONER

## 2024-04-29 PROCEDURE — 93010 ELECTROCARDIOGRAM REPORT: CPT | Performed by: INTERNAL MEDICINE

## 2024-04-29 PROCEDURE — 85027 COMPLETE CBC AUTOMATED: CPT

## 2024-04-29 PROCEDURE — 700101 HCHG RX REV CODE 250: Performed by: INTERNAL MEDICINE

## 2024-04-29 PROCEDURE — 99233 SBSQ HOSP IP/OBS HIGH 50: CPT | Performed by: INTERNAL MEDICINE

## 2024-04-29 PROCEDURE — 700102 HCHG RX REV CODE 250 W/ 637 OVERRIDE(OP): Performed by: INTERNAL MEDICINE

## 2024-04-29 PROCEDURE — A9270 NON-COVERED ITEM OR SERVICE: HCPCS | Performed by: INTERNAL MEDICINE

## 2024-04-29 PROCEDURE — 93306 TTE W/DOPPLER COMPLETE: CPT | Mod: 26 | Performed by: INTERNAL MEDICINE

## 2024-04-29 PROCEDURE — 99223 1ST HOSP IP/OBS HIGH 75: CPT | Mod: FS | Performed by: INTERNAL MEDICINE

## 2024-04-29 PROCEDURE — A9270 NON-COVERED ITEM OR SERVICE: HCPCS | Performed by: STUDENT IN AN ORGANIZED HEALTH CARE EDUCATION/TRAINING PROGRAM

## 2024-04-29 PROCEDURE — 99024 POSTOP FOLLOW-UP VISIT: CPT | Performed by: SURGERY

## 2024-04-29 PROCEDURE — 700117 HCHG RX CONTRAST REV CODE 255: Performed by: INTERNAL MEDICINE

## 2024-04-29 RX ADMIN — AMLODIPINE BESYLATE 5 MG: 5 TABLET ORAL at 04:40

## 2024-04-29 RX ADMIN — METOPROLOL SUCCINATE 25 MG: 25 TABLET, FILM COATED, EXTENDED RELEASE ORAL at 17:18

## 2024-04-29 RX ADMIN — HUMAN ALBUMIN MICROSPHERES AND PERFLUTREN 3 ML: 10; .22 INJECTION, SOLUTION INTRAVENOUS at 12:45

## 2024-04-29 RX ADMIN — CEFTRIAXONE SODIUM 2000 MG: 10 INJECTION, POWDER, FOR SOLUTION INTRAVENOUS at 04:40

## 2024-04-29 RX ADMIN — SENNOSIDES AND DOCUSATE SODIUM 2 TABLET: 50; 8.6 TABLET ORAL at 17:18

## 2024-04-29 RX ADMIN — LEVOTHYROXINE SODIUM 100 MCG: 0.1 TABLET ORAL at 04:40

## 2024-04-29 RX ADMIN — APIXABAN 5 MG: 5 TABLET, FILM COATED ORAL at 17:18

## 2024-04-29 RX ADMIN — METOPROLOL SUCCINATE 25 MG: 25 TABLET, FILM COATED, EXTENDED RELEASE ORAL at 04:40

## 2024-04-29 ASSESSMENT — ENCOUNTER SYMPTOMS
HEADACHES: 0
MYALGIAS: 0
DIZZINESS: 0
CHILLS: 0
FEVER: 0
DEPRESSION: 0
SHORTNESS OF BREATH: 0
ABDOMINAL PAIN: 1
NAUSEA: 0
VOMITING: 0

## 2024-04-29 ASSESSMENT — CHA2DS2 SCORE
VASCULAR DISEASE: NO
DIABETES: NO
SEX: MALE
PRIOR STROKE OR TIA OR THROMBOEMBOLISM: NO
CHF OR LEFT VENTRICULAR DYSFUNCTION: NO
HYPERTENSION: YES
CHA2DS2 VASC SCORE: 3
AGE 75 OR GREATER: YES
AGE 65 TO 74: NO

## 2024-04-29 ASSESSMENT — PAIN DESCRIPTION - PAIN TYPE: TYPE: ACUTE PAIN

## 2024-04-29 NOTE — CONSULTS
Cardiology Initial Consultation    Date of Service  4/29/2024    Referring Physician  Felecia Bell D.O.    Reason for Consultation  Afib and aortic stenosis    History of Presenting Illness  Victoriano Vasquez is a 84 y.o. male with a past medical history of past medical history of hypertension and hypothyroid who presented 4/26/2024 with abdominal pain s/p cholecystectomy on 4/28/2024.    Patient denies tobacco abuse, EtOH abuse, recreational drug use.    Overall, patient feels well.  Notes intermittent bilateral lower extremity edema after traveling.  He denies chest pain, shortness of breath, palpitations, tachycardia, dizziness/lightheadedness, orthopnea, PND.  Patient reports he was brought into the hospital after a fall.  The patient reports this was mechanical.  Patient denies any presyncopal or syncope.    EKG personally reviewed by myself as sinus rhythm with PAC    Echo shows moderate AS.      Patient appears regular on exam    Review of Systems  Review of Systems Complete review of systems negative except as noted in HPI/subjective    Past Medical History   has no past medical history on file.  Per above  Surgical History   has a past surgical history that includes jeronimo by laparoscopy (N/A, 4/28/2024) and cholangiograms (N/A, 4/28/2024).    Family History  family history is not on file.  Patient denies family cardiac history of MI or sudden cardiac death    Social History   reports that he has never smoked. He has never used smokeless tobacco. He reports that he does not drink alcohol and does not use drugs.    Medications  Prior to Admission Medications   Prescriptions Last Dose Informant Patient Reported? Taking?   amLODIPine (NORVASC) 5 MG Tab 4/25/2024 at AM Patient, Patient's Home Pharmacy Yes Yes   Sig: Take 5 mg by mouth every day.   levothyroxine (SYNTHROID) 100 MCG Tab 4/25/2024 at AM Patient, Patient's Home Pharmacy Yes Yes   Sig: Take 100 mcg by mouth every morning on an empty stomach.    metoprolol SR (TOPROL XL) 25 MG TABLET SR 24 HR 4/25/2024 at PM Patient, Patient's Home Pharmacy Yes Yes   Sig: Take 25 mg by mouth 2 times a day.      Facility-Administered Medications: None       Allergies  No Known Allergies    Vital signs in last 24 hours  Temp:  [36.1 °C (97 °F)-36.8 °C (98.2 °F)] 36.7 °C (98.1 °F)  Pulse:  [63-76] 66  Resp:  [17-18] 18  BP: (125-145)/(58-82) 133/73  SpO2:  [93 %-96 %] 95 %    Physical Exam  Physical Exam  Vitals and nursing note reviewed.   Constitutional:       General: He is not in acute distress.  Cardiovascular:      Rate and Rhythm: Normal rate and regular rhythm.      Pulses: Normal pulses.      Heart sounds: Murmur heard.      Systolic murmur is present with a grade of 3/6.   Pulmonary:      Effort: Pulmonary effort is normal. No respiratory distress.      Breath sounds: Normal breath sounds.   Abdominal:      General: Bowel sounds are normal. There is distension.      Palpations: Abdomen is soft.   Musculoskeletal:         General: No swelling.      Cervical back: Normal range of motion.      Right lower leg: No edema.      Left lower leg: No edema.   Skin:     General: Skin is warm and dry.   Neurological:      General: No focal deficit present.      Mental Status: He is alert and oriented to person, place, and time. Mental status is at baseline.   Psychiatric:         Mood and Affect: Mood normal.         Behavior: Behavior normal.         Lab Review  Lab Results   Component Value Date/Time    WBC 11.2 (H) 04/29/2024 01:27 AM    RBC 5.07 04/29/2024 01:27 AM    HEMOGLOBIN 15.2 04/29/2024 01:27 AM    HEMATOCRIT 44.9 04/29/2024 01:27 AM    MCV 88.6 04/29/2024 01:27 AM    MCH 30.0 04/29/2024 01:27 AM    MCHC 33.9 04/29/2024 01:27 AM    MPV 11.1 04/29/2024 01:27 AM      Lab Results   Component Value Date/Time    SODIUM 133 (L) 04/29/2024 01:27 AM    POTASSIUM 4.0 04/29/2024 01:27 AM    CHLORIDE 99 04/29/2024 01:27 AM    CO2 18 (L) 04/29/2024 01:27 AM    GLUCOSE 114 (H)  "04/29/2024 01:27 AM    BUN 14 04/29/2024 01:27 AM    CREATININE 0.87 04/29/2024 01:27 AM      Lab Results   Component Value Date/Time    ASTSGOT 81 (H) 04/29/2024 01:27 AM    ALTSGPT 124 (H) 04/29/2024 01:27 AM     Lab Results   Component Value Date/Time    CHOLSTRLTOT 151 04/30/2018 09:35 AM    LDL 87 04/30/2018 09:35 AM    HDL 42 04/30/2018 09:35 AM    TRIGLYCERIDE 108 04/30/2018 09:35 AM    TROPONINT 21 (H) 04/26/2024 06:49 AM        Latest Reference Range & Units 04/27/24 02:00   TSH 0.380 - 5.330 uIU/mL 2.480     No results for input(s): \"NTPROBNP\" in the last 72 hours.    Cardiac Imaging and Procedures Review  Echocardiogram (4/29/2024):    Normal left ventricular chamber size.  Moderate to severe aortic valve stenosis. Transvalvular gradients are -   Peak: 59 mmHg,  Mean: 31 Vmax is 3.9  m/s.  Mild to moderate aortic insufficiency.     Imaging  CT abdomen and pelvis with contrast (4/26/2024):  Vasculature: Atherosclerotic changes are seen.     Assessment/Plan  New onset Afib  -On admit EKG?  -regular on exam. Repeat EKG pending  -Likely stress induced. Normal TSH  -[id number] 4  -cardiac event monitoring at discharge to determine burden  -Continue eliquis for now    Stage B; Moderate AS  -asymptomic  -follow up outpatient    HTN; Aortic atherosclerosis  -Continue amlodipine and metop  -check lipids    Thank you for allowing me to participate in the care of this patient.    Cardiology will sign off on this patient  Future Appointments   Date Time Provider Department Center   5/29/2024  3:15 PM [facility] None     Please contact me with any questions.    Please see Dr. Sepulveda's attestation for further details and MDM.     I personally spent a total of 45 minutes which includes face-to-face time and non-face-to-face time spent on preparing to see the patient, reviewing hospital notes and tests, obtaining history from the patient, performing a medically appropriate exam, counseling " and educating the patient, ordering medications/tests/procedures/referrals as clinically indicated, and documenting information in the electronic medical record.    SUHAS Ocampo, HF-CERT   [facility]  [phone]

## 2024-04-29 NOTE — PROGRESS NOTES
Hospital Medicine Daily Progress Note    Date of Service  4/29/2024    Chief Complaint  Victoriano Vasquez is a 84 y.o. male admitted 4/26/2024 with abdominal pain     Hospital Course  84 y.o. male with a history of hypothyroidism, hypertension, who presented 4/26/2024 with RUQ abdominal pain.     In the ER Na 137, K 3.5, , AST/ALT 93/49, total bili 1.3; Wbc 8.8, Hb 16.4, lipase 52.   HR 86, /67, RA; Ua negative for UTI. RUQ U/S showed  biliary ductal dilatation. Ct ABD showed Gallbladder distention with calcified gallstones, intrahepatic biliary ductal dilatation. MRCP showed normal CBD, cholelithiasis and mild gallbladder wall thickening without pericholecystic fluid, possible early cholecystitis. Patient started on IV abx for concern for cholecystitis. Liver enzymes increased, , , t bili 3.6. Discussed with GI who recommended HIDA and if goes for surgery recommended IOC. HIDA positive for acute cholecystitis. General surgery consulted and patient taken for cholecystectomy on 4/28, TJ drain placed during surgery.     On admission patient noted to be in rate controlled afib on admission. Restarted on home metoprolol. Patient denies history of A-fib, echo showed EF preserved, moderate to severe aortic valve stenosis.  Discussed anticoagulation as [id number] score 4, patient agreeable started Eliquis.  Cardiology consulted.       Interval Problem Update  4/29 No acute events overnight  TJ drain with 45 ml out since placement   Per surgery continue drain for 1 more day, increased diet to gi soft   AST 81, , alk phos 169   Echo shows EF  preserved with moderate to severe aortic valve stenosis  Extensive discussion with patient and his wife at bedside about benefits versus risk of anticoagulation.  He is agreeable to starting Eliquis.  Due to new finding of aortic stenosis discussed case with cardiology  -Recommended Zio patch on discharge to look for burden will need outpatient  follow-up  Patient reports his pain is overall improving, anxious to discharge.     services were used in the patient's primary language of [place]ian.   Name or Number: Aldo  Mode of interpretation: Inhouse   Content of Interpretation:  As above    I have discussed this patient's plan of care and discharge plan at IDT rounds today with Case Management, Nursing, Nursing leadership, and other members of the IDT team.    Consultants/Specialty  General surgery     Code Status  Full Code    Disposition  The patient is not medically cleared for discharge to home or a post-acute facility.  Anticipate discharge to: home with close outpatient follow-up    I have placed the appropriate orders for post-discharge needs.    Review of Systems  Review of Systems   Constitutional:  Negative for chills and fever.   Respiratory:  Negative for shortness of breath.    Cardiovascular:  Negative for chest pain.   Gastrointestinal:  Positive for abdominal pain. Negative for nausea and vomiting.   Musculoskeletal:  Negative for myalgias.   Skin:  Negative for rash.   Neurological:  Negative for dizziness and headaches.   Psychiatric/Behavioral:  Negative for depression.    All other systems reviewed and are negative.       Physical Exam  Temp:  [36.1 °C (97 °F)-36.8 °C (98.2 °F)] 36.6 °C (97.8 °F)  Pulse:  [63-76] 68  Resp:  [17-18] 18  BP: (129-145)/(58-82) 130/60  SpO2:  [94 %-96 %] 94 %    Physical Exam  Vitals and nursing note reviewed.   Constitutional:       General: He is not in acute distress.     Appearance: Normal appearance. He is not ill-appearing.   HENT:      Head: Normocephalic and atraumatic.   Eyes:      Conjunctiva/sclera: Conjunctivae normal.   Cardiovascular:      Rate and Rhythm: Normal rate and regular rhythm.      Pulses: Normal pulses.   Pulmonary:      Effort: Pulmonary effort is normal. No respiratory distress.      Breath sounds: Normal breath sounds. No wheezing.   Abdominal:       General: There is no distension.      Palpations: Abdomen is soft.      Tenderness: There is abdominal tenderness.      Comments: TJ drain in place   Musculoskeletal:         General: No swelling. Normal range of motion.      Cervical back: Normal range of motion and neck supple.   Skin:     General: Skin is warm and dry.      Findings: No rash.   Neurological:      General: No focal deficit present.      Mental Status: He is alert and oriented to person, place, and time.      Cranial Nerves: No cranial nerve deficit.   Psychiatric:         Mood and Affect: Mood normal.         Behavior: Behavior normal.         Fluids    Intake/Output Summary (Last 24 hours) at 4/29/2024 1533  Last data filed at 4/29/2024 0600  Gross per 24 hour   Intake 200 ml   Output 25 ml   Net 175 ml       Laboratory  Recent Labs     04/27/24  0200 04/28/24  0138 04/29/24  0127   WBC 11.5* 9.2 11.2*   RBC 4.84 4.84 5.07   HEMOGLOBIN 14.7 14.6 15.2   HEMATOCRIT 42.6 43.1 44.9   MCV 88.0 89.0 88.6   MCH 30.4 30.2 30.0   MCHC 34.5 33.9 33.9   RDW 51.9* 53.4* 51.1*   PLATELETCT 199 199 233   MPV 11.1 11.0 11.1     Recent Labs     04/27/24  0200 04/28/24  0138 04/29/24  0127   SODIUM 139 136 133*   POTASSIUM 4.0 3.8 4.0   CHLORIDE 106 101 99   CO2 21 22 18*   GLUCOSE 83 73 114*   BUN 15 14 14   CREATININE 0.76 0.76 0.87   CALCIUM 8.8 8.4* 8.6                   Imaging  EC-ECHOCARDIOGRAM COMPLETE W/ CONT   Final Result      NM-HEPATOBILIARY SCAN   Final Result      1.  Nonvisualization of the gallbladder initially and after morphine administration which would be consistent with acute cholecystitis.   2.  Persistent of activity within the liver throughout the examination with delayed activity in the bile duct consistent with underlying hepatocellular disease.      [id number]-H/O   Final Result      1.  Normal CBD size for age. No intrahepatic biliary dilatation.   2.  Allowing for limitations of motion degradation, no CBD stones detected.   3.   Cholelithiasis and mild gallbladder wall thickening, without pericholecystic fluid. Early acute cholecystitis can be considered in the appropriate clinical settings.   4.  L2 compression fracture, new since 2015. Please correlate with back pain.         US-RUQ   Final Result         1. Hepatomegaly.   2. Cholelithiasis.   3. There is biliary ductal dilatation. The distal common bile duct and pancreas are obscured by bowel gas. This could be further assessed with ERCP or MRCP.      CT-ABDOMEN-PELVIS WITH   Final Result         1.  Gallbladder distention with calcified gallstones, otherwise unremarkable.   2.  Intrahepatic biliary ductal dilatation, consider causes of biliary obstruction with additional workup as clinically appropriate.   3.  Diverticulosis   4.  Atherosclerosis   5.  Fat-containing bilateral inguinal hernias           Assessment/Plan  * Abdominal pain- (present on admission)  Assessment & Plan  Abdominal pain for 1 day, more on the RUQ area and RLQ  No nausea vomiting or diarrhea  Trop 25>21; EKG afib, no acute ischemic STT changes    Elevated liver enzymes, AST/ALT 93/49, total bili 1.3  lipase 52      RUQ U/S showed biliary ductal dilatation. The distal common bile duct and pancreas are obscured by bowel gas.   Ct ABD showed gallbladder distention with calcified gallstones, Intrahepatic biliary ductal dilatation  MRCP possible early cholecystitis  -started rocephin     HIDA positive   General surgery consulted     Nonrheumatic aortic valve stenosis  Assessment & Plan  Moderate to severe aortic stenosis on echo, new finding for the patient  Cardiology consulted, referral placed to follow-up outpatient    Acute cholecystitis- (present on admission)  Assessment & Plan  HIDA positive for acute cholecystitis  Continue IV antibiotics  Surgery consulted, plan for cholecystectomy today    Encounter for perioperative consultation  Assessment & Plan  Admit to:    Orthopedic/Med-Surg floor since no major  co-morbidities.     Cardiovascular:   Patient does not have history of CHF  Pre-op EKG: Yes    Pulmonary:  Incentive Spirometer    GI:   No history of cirrhosis. Standard bowel regimen. Hold for loose stools.    Musculoskeletal:   Check 25 OH vitamin D level. If 31-40 pg/mL, consider starting vitamin D3 1000 IU PO daily. If 20-30 pg/mL, consider vitamin D3 2000 IU PO daily. If <20 pg/mL, vitamin D2 50,000 IU weekly x 8 weeks then 2000 IU PO daily.    Neurologic:   Pain Control: Neuro checks every 4 hours  Avoid fentanyl (short-acting)  Acetaminophen 1000 mg PO TID; 650 mg PO TID if liver problems    Hematologic:  Plan on pharmacologic DVT prophylaxis post operative day #1. Hold for decreasing hemoglobin. Notify provider for hemoglobin less than 8.  Order preoperative type and cross.   If patient was on anticoagulation prior to arrival risks and benefits will be weighed by teams including surgery, hospitalist, geriatrics, and anesthesia for delaying surgery more than 24 hours.   On anticoagulation prior to arrival: No    Medical Assessment Risk:  Low    Surgical Risk:   Low     Elevated bilirubin  Assessment & Plan  New Total bili 3.1 as of this morning    MRCP negative yesterday   Discussed with GI, will get HIDA scan       Hypokalemia  Assessment & Plan  Replaced  I ordered mag and Phos    AF (atrial fibrillation) (HCC)  Assessment & Plan  EKG at admission showed A-fib, with rate controlled.  No ischemic ST-T changes.   Continue home metoprolol  [id number] score 4  After discussion started Eliquis  Cardiology consulted, Zio patch on discharge    Primary hypertension  Assessment & Plan  I resumed the home amlodipine and metoprolol    Hypothyroidism  Assessment & Plan  I resumed the home levothyroxine  I ordered a TSH    Elevated liver enzymes  Assessment & Plan  AST/ALT 93/49, total bili 1.3  Ultrasound and CT showed bile duct dilation and gallbladder stones  MRCP pending  I ordered a.m. CMP to monitor    ACP  (advance care planning)  Assessment & Plan  Aox4. patient admitted for abdominal pain, possible choledocholithiasis.  Advanced age of 84.  I discussed the advanced care planning with the patient and the wife at bedside.  We discussed the diagnosis, treatments, benefits and risks.  We discussed the CODE STATUS including CPR and intubation.  The patient is willing to stay full code.  ACP 16 minutes      Total time spent in chart review, at bedside with the patient, discussing with consultants, nursing and case management: 59 minutes    VTE prophylaxis: SCDs/TEDs    I have performed a physical exam and reviewed and updated ROS and Plan today (4/29/2024). In review of yesterday's note (4/28/2024), there are no changes except as documented above.

## 2024-04-29 NOTE — PROGRESS NOTES
"  DATE: 4/29/2024    Post Operative Day  1 laparoscopic cholecystectomy.    INTERVAL EVENTS:  Drain with serosang output. No bile in drain. Patient has not eaten yet.  Says he feels better.    PHYSICAL EXAMINATION:  Vital Signs: /73   Pulse 66   Temp 36.7 °C (98.1 °F) (Temporal)   Resp 18   Ht 1.727 m (5' 8\")   Wt 82.6 kg (182 lb 1.6 oz)   SpO2 95%     Incisions c/d/I, drain with minimal output.    LABORATORY VALUES:  Recent Labs     04/27/24  0200 04/28/24 0138 04/29/24  0127   WBC 11.5* 9.2 11.2*   RBC 4.84 4.84 5.07   HEMOGLOBIN 14.7 14.6 15.2   HEMATOCRIT 42.6 43.1 44.9   MCV 88.0 89.0 88.6   MCH 30.4 30.2 30.0   MCHC 34.5 33.9 33.9   RDW 51.9* 53.4* 51.1*   PLATELETCT 199 199 233   MPV 11.1 11.0 11.1     Recent Labs     04/27/24  0200 04/28/24 0138 04/29/24  0127   SODIUM 139 136 133*   POTASSIUM 4.0 3.8 4.0   CHLORIDE 106 101 99   CO2 21 22 18*   GLUCOSE 83 73 114*   BUN 15 14 14   CREATININE 0.76 0.76 0.87   CALCIUM 8.8 8.4* 8.6     Recent Labs     04/27/24  0200 04/28/24 0138 04/29/24  0127   ASTSGOT 134* 64* 81*   ALTSGPT 181* 114* 124*   TBILIRUBIN 3.6* 1.4 0.9   ALKPHOSPHAT 143* 153* 169*   GLOBULIN 2.8 2.9 3.3            IMAGING:  NM-HEPATOBILIARY SCAN   Final Result      1.  Nonvisualization of the gallbladder initially and after morphine administration which would be consistent with acute cholecystitis.   2.  Persistent of activity within the liver throughout the examination with delayed activity in the bile duct consistent with underlying hepatocellular disease.      [id number]/O   Final Result      1.  Normal CBD size for age. No intrahepatic biliary dilatation.   2.  Allowing for limitations of motion degradation, no CBD stones detected.   3.  Cholelithiasis and mild gallbladder wall thickening, without pericholecystic fluid. Early acute cholecystitis can be considered in the appropriate clinical settings.   4.  L2 compression fracture, new since 2015. Please correlate with back " pain.         US-RUQ   Final Result         1. Hepatomegaly.   2. Cholelithiasis.   3. There is biliary ductal dilatation. The distal common bile duct and pancreas are obscured by bowel gas. This could be further assessed with ERCP or MRCP.      CT-ABDOMEN-PELVIS WITH   Final Result         1.  Gallbladder distention with calcified gallstones, otherwise unremarkable.   2.  Intrahepatic biliary ductal dilatation, consider causes of biliary obstruction with additional workup as clinically appropriate.   3.  Diverticulosis   4.  Atherosclerosis   5.  Fat-containing bilateral inguinal hernias      EC-ECHOCARDIOGRAM COMPLETE W/O CONT    (Results Pending)       ASSESSMENT AND PLAN:  Acute cholecystitis- (present on admission)  Assessment & Plan  S/p lap jeronimo on 4/28.  Advance diet as tolerated.   Drain one more day as diet progresses.              ____________________________________     Hien Burnette M.D.    DD: 4/29/2024  10:13 AM

## 2024-04-29 NOTE — PROGRESS NOTES
Assumed care of patient. Assessment performed. Patient reports no pain unless he is actively moving; does not request pain medication at this time. He has been reminded to use the call light before ambulating. Call light and belongings within reach. All needs at this time.

## 2024-04-29 NOTE — CARE PLAN
The patient is Stable - Low risk of patient condition declining or worsening    Shift Goals  Clinical Goals: Pain control, no falls, mobility  Patient Goals: Rest  Family Goals: none present    Progress made toward(s) clinical / shift goals:      Problem: Pain - Standard  Goal: Alleviation of pain or a reduction in pain to the patient’s comfort goal  Description: Target End Date:  Prior to discharge or change in level of care    Document on Vitals flowsheet    1.  Document pain using the appropriate pain scale per order or unit policy  2.  Educate and implement non-pharmacologic comfort measures (i.e. relaxation, distraction, massage, cold/heat therapy, etc.)  3.  Pain management medications as ordered  4.  Reassess pain after pain med administration per policy  5.  If opiods administered assess patient's response to pain medication is appropriate per POSS sedation scale  6.  Follow pain management plan developed in collaboration with patient and interdisciplinary team (including palliative care or pain specialists if applicable)  Outcome: Progressing  Note: This shift, pain will be controlled at a level tolerable to the patient.      Problem: Knowledge Deficit - Standard  Goal: Patient and family/care givers will demonstrate understanding of plan of care, disease process/condition, diagnostic tests and medications  Description: Target End Date:  1-3 days or as soon as patient condition allows    Document in Patient Education    1.  Patient and family/caregiver oriented to unit, equipment, visitation policy and means for communicating concern  2.  Complete/review Learning Assessment  3.  Assess knowledge level of disease process/condition, treatment plan, diagnostic tests and medications  4.  Explain disease process/condition, treatment plan, diagnostic tests and medications  Outcome: Progressing     Problem: Bowel Elimination  Goal: Establish and maintain regular bowel function  Description: Target End Date:  Prior  to discharge or change in level of care    1.   Note date of last BM  2.   Educate about diet, fluid intake, medication and activity to promote bowel function  3.   Educate signs and symptoms of constipation and interventions to implement  4.   Pharmacologic bowel management per provider order  5.   Regular toileting schedule  6.   Upright position for toileting  7.   High fiber diet  8.   Encourage hydration  9.   Collaborate with Clinical Dietician  10. Care and maintenance of ostomy if applicable  Outcome: Progressing  Note: Patient will have BM by morning.      Patient is not progressing towards the following goals:

## 2024-04-29 NOTE — ASSESSMENT & PLAN NOTE
S/p lap jeronimo on 4/28.  Advance diet as tolerated.   Drain with less output, but still appears purulent, ok to go home with drain and have the drain removed in clinic.

## 2024-04-30 ENCOUNTER — PHARMACY VISIT (OUTPATIENT)
Dept: PHARMACY | Facility: MEDICAL CENTER | Age: 84
End: 2024-04-30
Payer: COMMERCIAL

## 2024-04-30 VITALS
DIASTOLIC BLOOD PRESSURE: 68 MMHG | TEMPERATURE: 97.6 F | WEIGHT: 182.1 LBS | OXYGEN SATURATION: 96 % | HEART RATE: 65 BPM | RESPIRATION RATE: 15 BRPM | HEIGHT: 68 IN | SYSTOLIC BLOOD PRESSURE: 154 MMHG | BODY MASS INDEX: 27.6 KG/M2

## 2024-04-30 LAB
ALBUMIN SERPL BCP-MCNC: 3.2 G/DL (ref 3.2–4.9)
ALBUMIN/GLOB SERPL: 1 G/DL
ALP SERPL-CCNC: 137 U/L (ref 30–99)
ALT SERPL-CCNC: 81 U/L (ref 2–50)
ANION GAP SERPL CALC-SCNC: 15 MMOL/L (ref 7–16)
AST SERPL-CCNC: 39 U/L (ref 12–45)
BILIRUB SERPL-MCNC: 1.1 MG/DL (ref 0.1–1.5)
BUN SERPL-MCNC: 14 MG/DL (ref 8–22)
CALCIUM ALBUM COR SERPL-MCNC: 9.2 MG/DL (ref 8.5–10.5)
CALCIUM SERPL-MCNC: 8.6 MG/DL (ref 8.5–10.5)
CHLORIDE SERPL-SCNC: 101 MMOL/L (ref 96–112)
CHOLEST SERPL-MCNC: 196 MG/DL (ref 100–199)
CO2 SERPL-SCNC: 20 MMOL/L (ref 20–33)
CREAT SERPL-MCNC: 0.75 MG/DL (ref 0.5–1.4)
GFR SERPLBLD CREATININE-BSD FMLA CKD-EPI: 88 ML/MIN/1.73 M 2
GLOBULIN SER CALC-MCNC: 3.2 G/DL (ref 1.9–3.5)
GLUCOSE SERPL-MCNC: 85 MG/DL (ref 65–99)
HDLC SERPL-MCNC: 41 MG/DL
LDLC SERPL CALC-MCNC: 138 MG/DL
POTASSIUM SERPL-SCNC: 3.4 MMOL/L (ref 3.6–5.5)
PROT SERPL-MCNC: 6.4 G/DL (ref 6–8.2)
SODIUM SERPL-SCNC: 136 MMOL/L (ref 135–145)
TRIGL SERPL-MCNC: 85 MG/DL (ref 0–149)

## 2024-04-30 PROCEDURE — 99024 POSTOP FOLLOW-UP VISIT: CPT | Performed by: SURGERY

## 2024-04-30 PROCEDURE — A9270 NON-COVERED ITEM OR SERVICE: HCPCS | Performed by: STUDENT IN AN ORGANIZED HEALTH CARE EDUCATION/TRAINING PROGRAM

## 2024-04-30 PROCEDURE — 700102 HCHG RX REV CODE 250 W/ 637 OVERRIDE(OP): Performed by: INTERNAL MEDICINE

## 2024-04-30 PROCEDURE — 99239 HOSP IP/OBS DSCHRG MGMT >30: CPT | Performed by: STUDENT IN AN ORGANIZED HEALTH CARE EDUCATION/TRAINING PROGRAM

## 2024-04-30 PROCEDURE — RXMED WILLOW AMBULATORY MEDICATION CHARGE: Performed by: STUDENT IN AN ORGANIZED HEALTH CARE EDUCATION/TRAINING PROGRAM

## 2024-04-30 PROCEDURE — A9270 NON-COVERED ITEM OR SERVICE: HCPCS | Performed by: INTERNAL MEDICINE

## 2024-04-30 PROCEDURE — 700102 HCHG RX REV CODE 250 W/ 637 OVERRIDE(OP): Performed by: STUDENT IN AN ORGANIZED HEALTH CARE EDUCATION/TRAINING PROGRAM

## 2024-04-30 RX ORDER — AMOXICILLIN AND CLAVULANATE POTASSIUM 875; 125 MG/1; MG/1
1 TABLET, FILM COATED ORAL 2 TIMES DAILY
Qty: 14 TABLET | Refills: 0 | Status: ACTIVE | OUTPATIENT
Start: 2024-04-30 | End: 2024-05-07

## 2024-04-30 RX ORDER — ATORVASTATIN CALCIUM 40 MG/1
40 TABLET, FILM COATED ORAL EVERY EVENING
Status: DISCONTINUED | OUTPATIENT
Start: 2024-04-30 | End: 2024-04-30 | Stop reason: HOSPADM

## 2024-04-30 RX ORDER — POTASSIUM CHLORIDE 20 MEQ/1
40 TABLET, EXTENDED RELEASE ORAL ONCE
Status: COMPLETED | OUTPATIENT
Start: 2024-04-30 | End: 2024-04-30

## 2024-04-30 RX ORDER — ATORVASTATIN CALCIUM 40 MG/1
40 TABLET, FILM COATED ORAL EVERY EVENING
Qty: 30 TABLET | Refills: 0 | Status: SHIPPED | OUTPATIENT
Start: 2024-04-30 | End: 2024-05-30

## 2024-04-30 RX ORDER — OXYCODONE HYDROCHLORIDE 5 MG/1
5 TABLET ORAL EVERY 8 HOURS PRN
Qty: 15 TABLET | Refills: 0 | Status: SHIPPED | OUTPATIENT
Start: 2024-04-30 | End: 2024-05-05

## 2024-04-30 RX ADMIN — APIXABAN 5 MG: 5 TABLET, FILM COATED ORAL at 04:53

## 2024-04-30 RX ADMIN — AMLODIPINE BESYLATE 5 MG: 5 TABLET ORAL at 04:53

## 2024-04-30 RX ADMIN — METOPROLOL SUCCINATE 25 MG: 25 TABLET, FILM COATED, EXTENDED RELEASE ORAL at 04:53

## 2024-04-30 RX ADMIN — POTASSIUM CHLORIDE 40 MEQ: 1500 TABLET, EXTENDED RELEASE ORAL at 09:46

## 2024-04-30 RX ADMIN — OXYCODONE HYDROCHLORIDE 10 MG: 10 TABLET ORAL at 00:50

## 2024-04-30 RX ADMIN — LEVOTHYROXINE SODIUM 100 MCG: 0.1 TABLET ORAL at 04:53

## 2024-04-30 RX ADMIN — OXYCODONE 5 MG: 5 TABLET ORAL at 07:41

## 2024-04-30 ASSESSMENT — PAIN DESCRIPTION - PAIN TYPE
TYPE: ACUTE PAIN;SURGICAL PAIN
TYPE: ACUTE PAIN
TYPE: ACUTE PAIN

## 2024-04-30 NOTE — PROGRESS NOTES
PIVs removed with tips intact and sites covered with gauze and coban. Armband removed. Discussed dc instructions including drain care, medications, and follow-up appointments. Meds to beds delivered.

## 2024-04-30 NOTE — DISCHARGE SUMMARY
Discharge Summary    CHIEF COMPLAINT ON ADMISSION  Chief Complaint   Patient presents with    Abdominal Pain     Periumbilical pain x 4-5 hrs. Pt reports he was sitting on couch watching tv when pain started. Pt denies any abdominal surgeries or constipation. Denies fevers at home, +chills. Reports pain is unchanged with eating food.         Reason for Admission  Abd. Pain     Admission Date  4/26/2024    CODE STATUS  Full Code    HPI & HOSPITAL COURSE  84 y.o. male with a history of hypothyroidism, hypertension, who presented 4/26/2024 with RUQ abdominal pain. Elevated liver enzyme and bilirubin went up to 3.6     RUQ U/S showed  biliary ductal dilatation. Ct ABD showed Gallbladder distention with calcified gallstones, intrahepatic biliary ductal dilatation. MRCP showed normal CBD, cholelithiasis and mild gallbladder wall thickening without pericholecystic fluid, possible early cholecystitis. Acute cholecystitis was confirmed by HIDA.     General surgery consulted and patient underwent cholecystectomy on 4/28, TJ drain placed during surgery. At discharge,  less output of the TJ drain, but still appears purulent. I discussed with jaylene Lemus to go home with drain and have the drain removed in clinic in 2 wks. patient was advised to call the surgeon office for questions about the drain.     Patient has been treated with IV antibiotics. will continue  Augmentin for 1 week.  His liver enzymes trending down , and bilirubin normalized postsurgery.     On admission patient noted to be in rate controlled afib . Restarted on home metoprolol. Patient denies history of A-fib, echo showed EF preserved, moderate to severe aortic valve stenosis.  Patient initially started on Eliquis.  Cardiology consulted.  Recommended to discontinue Eliquis given no more atrial fibrillation events on telemetry monitor after admission.  Zio patch placed patient is referred to cardiology clinic.     Therefore, he is discharged in good and  stable condition to home with close outpatient follow-up.    The patient met 2-midnight criteria for an inpatient stay at the time of discharge.    Discharge Date  4/30/2024    FOLLOW UP ITEMS POST DISCHARGE  - Follow up with primary care physician in 1 week.   - Follow up with surgeon for drain removal, office number [phone]    - Please take the medications as instructed    - Go to the local Emergency Department if you have any worsening condition.       DISCHARGE DIAGNOSES  Principal Problem:    Abdominal pain (POA: Yes)  Active Problems:    Elevated liver enzymes (POA: Unknown)    Hypothyroidism (POA: Unknown)    Primary hypertension (POA: Unknown)    AF (atrial fibrillation) (HCC) (POA: Unknown)    Hypokalemia (POA: Unknown)    Elevated bilirubin (POA: Unknown)    Encounter for perioperative consultation (POA: Unknown)    Acute cholecystitis (POA: Yes)    Nonrheumatic aortic valve stenosis (POA: Unknown)    ACP (advance care planning) (POA: Unknown)  Resolved Problems:    * No resolved hospital problems. *      FOLLOW UP  Future Appointments   Date Time Provider Department Center   5/29/2024  3:15 PM TAB Angulo CARCB None     [facility]  [address]  [facility] [address]  [phone]  Follow up in 2 week(s)        MEDICATIONS ON DISCHARGE     Medication List        START taking these medications        Instructions   amoxicillin-clavulanate 875-125 MG Tabs  Commonly known as: Augmentin   [place] 1 tableta por vía oral 2 veces al día por 7 días.  (Take 1 Tablet by mouth 2 times a day for 7 days.)  Dose: 1 Tablet     atorvastatin 40 MG Tabs  Commonly known as: Lipitor   Take 1 Tablet by mouth every evening for 30 days.  Dose: 40 mg     oxyCODONE immediate-release 5 MG Tabs  Commonly known as: Roxicodone   Take 1 Tablet by mouth every 8 hours as needed for Severe Pain for up to 5 days.  Dose: 5 mg            CONTINUE taking these medications        Instructions    amLODIPine 5 MG Tabs  Commonly known as: Norvasc   Take 5 mg by mouth every day.  Dose: 5 mg     levothyroxine 100 MCG Tabs  Commonly known as: Synthroid   Take 100 mcg by mouth every morning on an empty stomach.  Dose: 100 mcg     metoprolol SR 25 MG Tb24  Commonly known as: Toprol XL   Take 25 mg by mouth 2 times a day.  Dose: 25 mg              Allergies  No Known Allergies    DIET  Orders Placed This Encounter   Procedures    Diet Order Diet: Low Fiber(GI Soft)     Standing Status:   Standing     Number of Occurrences:   1     Order Specific Question:   Diet:     Answer:   Low Fiber(GI Soft) [2]       ACTIVITY  As tolerated.  Weight bearing as tolerated    CONSULTATIONS   General surgeon, cardiology    PROCEDURES   laparoscopic cholecystectomy 4/29/2024    LABORATORY  Lab Results   Component Value Date    SODIUM 136 04/30/2024    POTASSIUM 3.4 (L) 04/30/2024    CHLORIDE 101 04/30/2024    CO2 20 04/30/2024    GLUCOSE 85 04/30/2024    BUN 14 04/30/2024    CREATININE 0.75 04/30/2024        Lab Results   Component Value Date    WBC 11.2 (H) 04/29/2024    HEMOGLOBIN 15.2 04/29/2024    HEMATOCRIT 44.9 04/29/2024    PLATELETCT 233 04/29/2024        Total time of the discharge process exceeds 34 minutes.

## 2024-04-30 NOTE — PROGRESS NOTES
"  DATE: 4/30/2024    Post Operative Day  2 laparoscopic cholecystectomy.    INTERVAL EVENTS:  Tolerating diet. Pain well controlled.     PHYSICAL EXAMINATION:  Vital Signs: BP (!) 154/68   Pulse 65   Temp 36.4 °C (97.6 °F) (Temporal)   Resp 15   Ht 1.727 m (5' 8\")   Wt 82.6 kg (182 lb 1.6 oz)   SpO2 96%     Incisions c/d/I, drain with minimal purulent output.    LABORATORY VALUES:  Recent Labs     04/28/24  0138 04/29/24 0127   WBC 9.2 11.2*   RBC 4.84 5.07   HEMOGLOBIN 14.6 15.2   HEMATOCRIT 43.1 44.9   MCV 89.0 88.6   MCH 30.2 30.0   MCHC 33.9 33.9   RDW 53.4* 51.1*   PLATELETCT 199 233   MPV 11.0 11.1     Recent Labs     04/28/24  0138 04/29/24  0127 04/30/24  0559   SODIUM 136 133* 136   POTASSIUM 3.8 4.0 3.4*   CHLORIDE 101 99 101   CO2 22 18* 20   GLUCOSE 73 114* 85   BUN 14 14 14   CREATININE 0.76 0.87 0.75   CALCIUM 8.4* 8.6 8.6     Recent Labs     04/28/24 0138 04/29/24  0127 04/30/24  0559   ASTSGOT 64* 81* 39   ALTSGPT 114* 124* 81*   TBILIRUBIN 1.4 0.9 1.1   ALKPHOSPHAT 153* 169* 137*   GLOBULIN 2.9 3.3 3.2            IMAGING:  EC-ECHOCARDIOGRAM COMPLETE W/ CONT   Final Result      NM-HEPATOBILIARY SCAN   Final Result      1.  Nonvisualization of the gallbladder initially and after morphine administration which would be consistent with acute cholecystitis.   2.  Persistent of activity within the liver throughout the examination with delayed activity in the bile duct consistent with underlying hepatocellular disease.      [id number]-I/O   Final Result      1.  Normal CBD size for age. No intrahepatic biliary dilatation.   2.  Allowing for limitations of motion degradation, no CBD stones detected.   3.  Cholelithiasis and mild gallbladder wall thickening, without pericholecystic fluid. Early acute cholecystitis can be considered in the appropriate clinical settings.   4.  L2 compression fracture, new since 2015. Please correlate with back pain.         US-RUQ   Final Result         1. Hepatomegaly. "   2. Cholelithiasis.   3. There is biliary ductal dilatation. The distal common bile duct and pancreas are obscured by bowel gas. This could be further assessed with ERCP or MRCP.      CT-ABDOMEN-PELVIS WITH   Final Result         1.  Gallbladder distention with calcified gallstones, otherwise unremarkable.   2.  Intrahepatic biliary ductal dilatation, consider causes of biliary obstruction with additional workup as clinically appropriate.   3.  Diverticulosis   4.  Atherosclerosis   5.  Fat-containing bilateral inguinal hernias          ASSESSMENT AND PLAN:  Acute cholecystitis- (present on admission)  Assessment & Plan  S/p lap jeronimo on 4/28.  Advance diet as tolerated.   Drain with less output, but still appears purulent, ok to go home with drain and have the drain removed in clinic.            ____________________________________     Hien Burnette M.D.    DD: 4/29/2024  10:13 AM

## 2024-04-30 NOTE — DISCHARGE PLANNING
Case Management Discharge Planning    Admission Date: 4/26/2024  GMLOS: 2.3  ALOS: 4    6-Clicks ADL Score: 24  6-Clicks Mobility Score: 24      Anticipated Discharge Dispo: Discharge Disposition: Discharged to home/self care (01)    DME Needed: No    Action(s) Taken: LMSW met with the pt and wife at bedside to complete IMM. IMM given to the pt in [place]. Pt and wife will need a cab called for time of DC.     Escalations Completed: None    Medically Clear: Yes    Next Steps: Pt to DC home with support from family.     Barriers to Discharge: Medical clearance and Transportation    Is the patient up for discharge tomorrow: No        No

## 2024-04-30 NOTE — PROGRESS NOTES
Name:  Arpan   ID Number:  [id number]    Content Discussed:  TJ home care.  RN demonstrated TJ drain care for home.  Patient and wife repeat demonstrated back to RN how to empty TJ drain and record output volume and color.  Patient and wife verbalized that they are comfortable with caring for TJ drain at home.  Extra dressings and measuring container provided to patient.

## 2024-04-30 NOTE — CARE PLAN
The patient is Stable - Low risk of patient condition declining or worsening    Shift Goals  Clinical Goals: Patient will ambulate hallway and sit in chair for meals  Patient Goals: wants to go home  Family Goals: none present    Progress made toward(s) clinical / shift goals:  Patient ambulated to bathroom and sat in chair for breakfast      Problem: Pain - Standard  Goal: Alleviation of pain or a reduction in pain to the patient’s comfort goal  Description: Target End Date:  Prior to discharge or change in level of care    Document on Vitals flowsheet    1.  Document pain using the appropriate pain scale per order or unit policy  2.  Educate and implement non-pharmacologic comfort measures (i.e. relaxation, distraction, massage, cold/heat therapy, etc.)  3.  Pain management medications as ordered  4.  Reassess pain after pain med administration per policy  5.  If opiods administered assess patient's response to pain medication is appropriate per POSS sedation scale  6.  Follow pain management plan developed in collaboration with patient and interdisciplinary team (including palliative care or pain specialists if applicable)  Outcome: Progressing       Problem: Knowledge Deficit - Standard  Goal: Patient and family/care givers will demonstrate understanding of plan of care, disease process/condition, diagnostic tests and medications  Description: Target End Date:  1-3 days or as soon as patient condition allows    Document in Patient Education    1.  Patient and family/caregiver oriented to unit, equipment, visitation policy and means for communicating concern  2.  Complete/review Learning Assessment  3.  Assess knowledge level of disease process/condition, treatment plan, diagnostic tests and medications  4.  Explain disease process/condition, treatment plan, diagnostic tests and medications  Outcome: Progressing     Patient is not progressing towards the following goals:

## 2024-04-30 NOTE — CARE PLAN
The patient is Stable - Low risk of patient condition declining or worsening    Shift Goals  Clinical Goals: Pain management, monitor TJ drain, safety  Patient Goals: Rest,go home  Family Goals: none present    Progress made toward(s) clinical / shift goals:  Safety    Patient is not progressing towards the following goals:    Pt care assumed, no c/o pain voiced throughout shift. Pt encouraged to ambulate, pt refuses despite education provided. Medicated per MAR, tolerated well. Reinforced the use of call light when assistance is needed. Safety measures in place.

## 2024-04-30 NOTE — CARE PLAN
The patient is Watcher - Medium risk of patient condition declining or worsening    Shift Goals  Clinical Goals: By 1am, patient wiill report tolerable level of pain 3/10.  Patient Goals: wants to go home  Family Goals: none present    Progress made toward(s) clinical / shift goals: Patient reported RLQ pain 8/10, Oxycodone 10mg tab. PO given (see MAR). Encouraged verbalization of feelings about pain and comfort measures provided.      Patient is not progressing towards the following goals: N/A

## 2024-05-13 NOTE — PROGRESS NOTES
HISTORY OF PRESENT ILLNESS: The patient is a 84 year-old elderly man who returns to the [facility] for routine follow-up after undergoing a laparoscopic cholecystectomy for acute cholecystitis by Tomas Rodrigues MD on 4/28/2024. He was discharge with TJ drain in place and prescribed 7 day course of Augmentin. Since surgery, drain output has slowed and his abdominal pain has resolved. Reports low appetite, but no nausea, vomiting, or change in bowel movements.     CURRENT MEDICATIONS:  Current Outpatient Medications   Medication Sig    atorvastatin (LIPITOR) 40 MG Tab Take 1 Tablet by mouth every evening for 30 days.    levothyroxine (SYNTHROID) 100 MCG Tab Take 100 mcg by mouth every morning on an empty stomach.    amLODIPine (NORVASC) 5 MG Tab Take 5 mg by mouth every day.    metoprolol SR (TOPROL XL) 25 MG TABLET SR 24 HR Take 25 mg by mouth 2 times a day.       PHYSICAL EXAMINATION:  Vital Signs: There were no vitals taken for this visit.  Physical Exam  Vitals and nursing note reviewed. Exam conducted with a chaperone present.   Constitutional:       General: He is not in acute distress.     Appearance: He is not ill-appearing.   HENT:      Head: Normocephalic.   Eyes:      General: No scleral icterus.  Cardiovascular:      Rate and Rhythm: Normal rate.   Pulmonary:      Effort: Pulmonary effort is normal. No respiratory distress.   Abdominal:      General: There is no distension.      Palpations: Abdomen is soft.      Tenderness: There is no abdominal tenderness. There is no guarding.      Comments: Port sites approximated with steri strips and well healing   Drain in place RUQ with scant serous drainage    Skin:     General: Skin is warm and dry.      Coloration: Skin is not jaundiced.   Neurological:      Mental Status: He is alert.         LABORATORY VALUES:  Lab Results   Component Value Date/Time    WBC 11.2 (H) 04/29/2024 01:27 AM    RBC 5.07 04/29/2024 01:27 AM    HEMOGLOBIN  15.2 04/29/2024 01:27 AM    HEMATOCRIT 44.9 04/29/2024 01:27 AM    MCV 88.6 04/29/2024 01:27 AM    MCH 30.0 04/29/2024 01:27 AM    MCHC 33.9 04/29/2024 01:27 AM    MPV 11.1 04/29/2024 01:27 AM    NEUTSPOLYS 63.70 04/26/2024 03:59 AM    LYMPHOCYTES 30.30 04/26/2024 03:59 AM    MONOCYTES 4.80 04/26/2024 03:59 AM    EOSINOPHILS 0.80 04/26/2024 03:59 AM    BASOPHILS 0.20 04/26/2024 03:59 AM     Lab Results   Component Value Date/Time    SODIUM 136 04/30/2024 05:59 AM    POTASSIUM 3.4 (L) 04/30/2024 05:59 AM    CHLORIDE 101 04/30/2024 05:59 AM    CO2 20 04/30/2024 05:59 AM    GLUCOSE 85 04/30/2024 05:59 AM    BUN 14 04/30/2024 05:59 AM    CREATININE 0.75 04/30/2024 05:59 AM     Lab Results   Component Value Date/Time    PROTHROMBTM 10.4 06/01/2009 03:00 AM    INR 0.90 06/01/2009 03:00 AM       IMAGING:  No orders to display       PATHOLOGY: Final pathology demonstrated Gallbladder:         Chronic cholecystitis.          Cholelithiasis.          Cholesterolosis.    ASSESSMENT AND PLAN:  Satisfactory progress following a laparoscopic cholecystectomy and attempted intra-operative cholangiogram.   Final postoperative instructions provided. Lifting restrictions reviewed. Discharge from the [facility]. Follow up as needed.       ____________________________________     Amelie Burrows P.A.-C.    DD: 5/14/2024  1:27 PM

## 2024-05-14 ENCOUNTER — OFFICE VISIT (OUTPATIENT)
Dept: SURGERY | Facility: MEDICAL CENTER | Age: 84
End: 2024-05-14
Attending: STUDENT IN AN ORGANIZED HEALTH CARE EDUCATION/TRAINING PROGRAM
Payer: MEDICARE

## 2024-05-14 VITALS
SYSTOLIC BLOOD PRESSURE: 120 MMHG | HEART RATE: 78 BPM | RESPIRATION RATE: 16 BRPM | OXYGEN SATURATION: 96 % | DIASTOLIC BLOOD PRESSURE: 72 MMHG | WEIGHT: 170 LBS | HEIGHT: 64 IN | BODY MASS INDEX: 29.02 KG/M2

## 2024-05-14 DIAGNOSIS — Z90.49 STATUS POST LAPAROSCOPIC CHOLECYSTECTOMY: ICD-10-CM

## 2024-05-14 PROCEDURE — 99024 POSTOP FOLLOW-UP VISIT: CPT | Performed by: PHYSICIAN ASSISTANT

## 2024-05-14 ASSESSMENT — FIBROSIS 4 INDEX: FIB4 SCORE: 1.6

## 2024-05-24 ENCOUNTER — TELEPHONE (OUTPATIENT)
Dept: CARDIOLOGY | Facility: MEDICAL CENTER | Age: 84
End: 2024-05-24
Payer: MEDICARE

## 2024-05-24 DIAGNOSIS — I48.0 PAROXYSMAL ATRIAL FIBRILLATION (HCC): ICD-10-CM

## 2024-05-24 NOTE — TELEPHONE ENCOUNTER
AJCK EOS to 's nurse, Danni, on 5/24/2024  Monitor noted:  2 runs of VT, 176-231 with an avg rate of 197 BPM  99 runs of SVT,  with an avg rate of 125 BPM  Sinus rhythm,  with an avg rate of 73 BPM  2.3% PAC burden  No patient events recorded

## 2024-05-29 ENCOUNTER — OFFICE VISIT (OUTPATIENT)
Dept: CARDIOLOGY | Facility: MEDICAL CENTER | Age: 84
End: 2024-05-29
Payer: MEDICARE

## 2024-05-29 ENCOUNTER — TELEPHONE (OUTPATIENT)
Dept: CARDIOLOGY | Facility: MEDICAL CENTER | Age: 84
End: 2024-05-29

## 2024-05-29 VITALS
OXYGEN SATURATION: 98 % | HEART RATE: 86 BPM | RESPIRATION RATE: 16 BRPM | BODY MASS INDEX: 25.98 KG/M2 | HEIGHT: 68 IN | WEIGHT: 171.4 LBS | DIASTOLIC BLOOD PRESSURE: 60 MMHG | SYSTOLIC BLOOD PRESSURE: 110 MMHG

## 2024-05-29 DIAGNOSIS — I47.29 NSVT (NONSUSTAINED VENTRICULAR TACHYCARDIA) (HCC): ICD-10-CM

## 2024-05-29 DIAGNOSIS — I10 PRIMARY HYPERTENSION: ICD-10-CM

## 2024-05-29 DIAGNOSIS — I35.0 NONRHEUMATIC AORTIC VALVE STENOSIS: ICD-10-CM

## 2024-05-29 ASSESSMENT — ENCOUNTER SYMPTOMS
SHORTNESS OF BREATH: 0
MUSCULOSKELETAL NEGATIVE: 1
NEUROLOGICAL NEGATIVE: 1
EYES NEGATIVE: 1
NERVOUS/ANXIOUS: 0
ABDOMINAL PAIN: 1
PND: 0
ORTHOPNEA: 0
PALPITATIONS: 0
DEPRESSION: 0
CONSTITUTIONAL NEGATIVE: 1

## 2024-05-29 ASSESSMENT — FIBROSIS 4 INDEX: FIB4 SCORE: 1.6

## 2024-06-24 ENCOUNTER — HOSPITAL ENCOUNTER (INPATIENT)
Facility: MEDICAL CENTER | Age: 84
DRG: 460 | End: 2024-06-24
Attending: EMERGENCY MEDICINE | Admitting: FAMILY MEDICINE
Payer: MEDICARE

## 2024-06-24 ENCOUNTER — APPOINTMENT (OUTPATIENT)
Dept: RADIOLOGY | Facility: MEDICAL CENTER | Age: 84
DRG: 460 | End: 2024-06-24
Attending: EMERGENCY MEDICINE
Payer: MEDICARE

## 2024-06-24 DIAGNOSIS — S32.020S CLOSED COMPRESSION FRACTURE OF L2 LUMBAR VERTEBRA, SEQUELA: ICD-10-CM

## 2024-06-24 DIAGNOSIS — S32.000A LUMBAR COMPRESSION FRACTURE, CLOSED, INITIAL ENCOUNTER (HCC): ICD-10-CM

## 2024-06-24 LAB
ALBUMIN SERPL BCP-MCNC: 3.1 G/DL (ref 3.2–4.9)
ALBUMIN/GLOB SERPL: 0.7 G/DL
ALP SERPL-CCNC: 178 U/L (ref 30–99)
ALT SERPL-CCNC: 21 U/L (ref 2–50)
ANION GAP SERPL CALC-SCNC: 13 MMOL/L (ref 7–16)
APTT PPP: 24.2 SEC (ref 24.7–36)
AST SERPL-CCNC: 28 U/L (ref 12–45)
BASOPHILS # BLD AUTO: 0.4 % (ref 0–1.8)
BASOPHILS # BLD: 0.04 K/UL (ref 0–0.12)
BILIRUB SERPL-MCNC: 0.6 MG/DL (ref 0.1–1.5)
BUN SERPL-MCNC: 14 MG/DL (ref 8–22)
CALCIUM ALBUM COR SERPL-MCNC: 9.7 MG/DL (ref 8.5–10.5)
CALCIUM SERPL-MCNC: 9 MG/DL (ref 8.5–10.5)
CHLORIDE SERPL-SCNC: 98 MMOL/L (ref 96–112)
CO2 SERPL-SCNC: 19 MMOL/L (ref 20–33)
CREAT SERPL-MCNC: 0.82 MG/DL (ref 0.5–1.4)
EOSINOPHIL # BLD AUTO: 0.04 K/UL (ref 0–0.51)
EOSINOPHIL NFR BLD: 0.4 % (ref 0–6.9)
ERYTHROCYTE [DISTWIDTH] IN BLOOD BY AUTOMATED COUNT: 57.5 FL (ref 35.9–50)
GFR SERPLBLD CREATININE-BSD FMLA CKD-EPI: 85 ML/MIN/1.73 M 2
GLOBULIN SER CALC-MCNC: 4.2 G/DL (ref 1.9–3.5)
GLUCOSE SERPL-MCNC: 98 MG/DL (ref 65–99)
HCT VFR BLD AUTO: 41.3 % (ref 42–52)
HGB BLD-MCNC: 13.8 G/DL (ref 14–18)
IMM GRANULOCYTES # BLD AUTO: 0.06 K/UL (ref 0–0.11)
IMM GRANULOCYTES NFR BLD AUTO: 0.6 % (ref 0–0.9)
INR PPP: 1.12 (ref 0.87–1.13)
LYMPHOCYTES # BLD AUTO: 1.37 K/UL (ref 1–4.8)
LYMPHOCYTES NFR BLD: 13.4 % (ref 22–41)
MCH RBC QN AUTO: 30.1 PG (ref 27–33)
MCHC RBC AUTO-ENTMCNC: 33.4 G/DL (ref 32.3–36.5)
MCV RBC AUTO: 90 FL (ref 81.4–97.8)
MONOCYTES # BLD AUTO: 0.62 K/UL (ref 0–0.85)
MONOCYTES NFR BLD AUTO: 6.1 % (ref 0–13.4)
NEUTROPHILS # BLD AUTO: 8.07 K/UL (ref 1.82–7.42)
NEUTROPHILS NFR BLD: 79.1 % (ref 44–72)
NRBC # BLD AUTO: 0 K/UL
NRBC BLD-RTO: 0 /100 WBC (ref 0–0.2)
PLATELET # BLD AUTO: 308 K/UL (ref 164–446)
PMV BLD AUTO: 9.8 FL (ref 9–12.9)
POTASSIUM SERPL-SCNC: 4.2 MMOL/L (ref 3.6–5.5)
PROT SERPL-MCNC: 7.3 G/DL (ref 6–8.2)
PROTHROMBIN TIME: 14.5 SEC (ref 12–14.6)
RBC # BLD AUTO: 4.59 M/UL (ref 4.7–6.1)
SODIUM SERPL-SCNC: 130 MMOL/L (ref 135–145)
WBC # BLD AUTO: 10.2 K/UL (ref 4.8–10.8)

## 2024-06-24 PROCEDURE — 99285 EMERGENCY DEPT VISIT HI MDM: CPT

## 2024-06-24 PROCEDURE — 85610 PROTHROMBIN TIME: CPT

## 2024-06-24 PROCEDURE — 36415 COLL VENOUS BLD VENIPUNCTURE: CPT

## 2024-06-24 PROCEDURE — 80053 COMPREHEN METABOLIC PANEL: CPT

## 2024-06-24 PROCEDURE — A9270 NON-COVERED ITEM OR SERVICE: HCPCS

## 2024-06-24 PROCEDURE — 700102 HCHG RX REV CODE 250 W/ 637 OVERRIDE(OP)

## 2024-06-24 PROCEDURE — 770001 HCHG ROOM/CARE - MED/SURG/GYN PRIV*

## 2024-06-24 PROCEDURE — 85025 COMPLETE CBC W/AUTO DIFF WBC: CPT

## 2024-06-24 PROCEDURE — 85730 THROMBOPLASTIN TIME PARTIAL: CPT

## 2024-06-24 RX ORDER — TRAMADOL HYDROCHLORIDE 50 MG/1
50 TABLET ORAL EVERY 6 HOURS PRN
COMMUNITY
Start: 2024-06-20

## 2024-06-24 RX ORDER — POLYETHYLENE GLYCOL 3350 17 G/17G
1 POWDER, FOR SOLUTION ORAL
Status: DISCONTINUED | OUTPATIENT
Start: 2024-06-24 | End: 2024-06-27

## 2024-06-24 RX ORDER — HYDROMORPHONE HYDROCHLORIDE 1 MG/ML
0.25 INJECTION, SOLUTION INTRAMUSCULAR; INTRAVENOUS; SUBCUTANEOUS
Status: DISCONTINUED | OUTPATIENT
Start: 2024-06-24 | End: 2024-06-27

## 2024-06-24 RX ORDER — ACETAMINOPHEN 325 MG/1
650 TABLET ORAL EVERY 6 HOURS PRN
Status: DISCONTINUED | OUTPATIENT
Start: 2024-06-24 | End: 2024-06-30

## 2024-06-24 RX ORDER — LEVOTHYROXINE SODIUM 0.1 MG/1
100 TABLET ORAL
Status: DISCONTINUED | OUTPATIENT
Start: 2024-06-25 | End: 2024-07-09 | Stop reason: HOSPADM

## 2024-06-24 RX ORDER — ATORVASTATIN CALCIUM 40 MG/1
40 TABLET, FILM COATED ORAL EVERY EVENING
Status: DISCONTINUED | OUTPATIENT
Start: 2024-06-24 | End: 2024-07-09 | Stop reason: HOSPADM

## 2024-06-24 RX ORDER — SULFAMETHOXAZOLE AND TRIMETHOPRIM 800; 160 MG/1; MG/1
1 TABLET ORAL 2 TIMES DAILY
COMMUNITY
Start: 2024-06-20

## 2024-06-24 RX ORDER — LABETALOL HYDROCHLORIDE 5 MG/ML
10 INJECTION, SOLUTION INTRAVENOUS EVERY 4 HOURS PRN
Status: DISCONTINUED | OUTPATIENT
Start: 2024-06-24 | End: 2024-07-09 | Stop reason: HOSPADM

## 2024-06-24 RX ORDER — AMOXICILLIN 250 MG
2 CAPSULE ORAL EVERY EVENING
Status: DISCONTINUED | OUTPATIENT
Start: 2024-06-24 | End: 2024-06-27

## 2024-06-24 RX ORDER — OXYCODONE HYDROCHLORIDE 5 MG/1
2.5 TABLET ORAL
Status: DISCONTINUED | OUTPATIENT
Start: 2024-06-24 | End: 2024-06-27

## 2024-06-24 RX ORDER — AMLODIPINE BESYLATE 5 MG/1
5 TABLET ORAL DAILY
Status: DISCONTINUED | OUTPATIENT
Start: 2024-06-25 | End: 2024-07-09 | Stop reason: HOSPADM

## 2024-06-24 RX ORDER — METOPROLOL SUCCINATE 25 MG/1
25 TABLET, EXTENDED RELEASE ORAL 2 TIMES DAILY
Status: DISCONTINUED | OUTPATIENT
Start: 2024-06-24 | End: 2024-07-09 | Stop reason: HOSPADM

## 2024-06-24 RX ORDER — ATORVASTATIN CALCIUM 40 MG/1
40 TABLET, FILM COATED ORAL EVERY EVENING
COMMUNITY
Start: 2024-06-20

## 2024-06-24 RX ORDER — HEPARIN SODIUM 5000 [USP'U]/ML
5000 INJECTION, SOLUTION INTRAVENOUS; SUBCUTANEOUS EVERY 8 HOURS
Status: DISCONTINUED | OUTPATIENT
Start: 2024-06-25 | End: 2024-06-26

## 2024-06-24 RX ORDER — OXYCODONE HYDROCHLORIDE 5 MG/1
5 TABLET ORAL
Status: DISCONTINUED | OUTPATIENT
Start: 2024-06-24 | End: 2024-06-27

## 2024-06-24 RX ADMIN — SENNOSIDES AND DOCUSATE SODIUM 2 TABLET: 50; 8.6 TABLET ORAL at 17:51

## 2024-06-24 RX ADMIN — OXYCODONE 5 MG: 5 TABLET ORAL at 22:44

## 2024-06-24 RX ADMIN — ATORVASTATIN CALCIUM 40 MG: 40 TABLET, FILM COATED ORAL at 17:52

## 2024-06-24 RX ADMIN — METOPROLOL SUCCINATE 25 MG: 25 TABLET, EXTENDED RELEASE ORAL at 17:51

## 2024-06-24 SDOH — ECONOMIC STABILITY: TRANSPORTATION INSECURITY
IN THE PAST 12 MONTHS, HAS THE LACK OF TRANSPORTATION KEPT YOU FROM MEDICAL APPOINTMENTS OR FROM GETTING MEDICATIONS?: YES

## 2024-06-24 SDOH — ECONOMIC STABILITY: TRANSPORTATION INSECURITY
IN THE PAST 12 MONTHS, HAS LACK OF RELIABLE TRANSPORTATION KEPT YOU FROM MEDICAL APPOINTMENTS, MEETINGS, WORK OR FROM GETTING THINGS NEEDED FOR DAILY LIVING?: YES

## 2024-06-24 ASSESSMENT — SOCIAL DETERMINANTS OF HEALTH (SDOH)
WITHIN THE PAST 12 MONTHS, YOU WORRIED THAT YOUR FOOD WOULD RUN OUT BEFORE YOU GOT THE MONEY TO BUY MORE: NEVER TRUE
IN THE PAST 12 MONTHS, HAS THE ELECTRIC, GAS, OIL, OR WATER COMPANY THREATENED TO SHUT OFF SERVICE IN YOUR HOME?: NO
WITHIN THE LAST YEAR, HAVE YOU BEEN AFRAID OF YOUR PARTNER OR EX-PARTNER?: NO
WITHIN THE LAST YEAR, HAVE YOU BEEN KICKED, HIT, SLAPPED, OR OTHERWISE PHYSICALLY HURT BY YOUR PARTNER OR EX-PARTNER?: NO
WITHIN THE PAST 12 MONTHS, THE FOOD YOU BOUGHT JUST DIDN'T LAST AND YOU DIDN'T HAVE MONEY TO GET MORE: NEVER TRUE
WITHIN THE LAST YEAR, HAVE TO BEEN RAPED OR FORCED TO HAVE ANY KIND OF SEXUAL ACTIVITY BY YOUR PARTNER OR EX-PARTNER?: NO
WITHIN THE LAST YEAR, HAVE YOU BEEN HUMILIATED OR EMOTIONALLY ABUSED IN OTHER WAYS BY YOUR PARTNER OR EX-PARTNER?: NO

## 2024-06-24 ASSESSMENT — LIFESTYLE VARIABLES
ON A TYPICAL DAY WHEN YOU DRINK ALCOHOL HOW MANY DRINKS DO YOU HAVE: 0
TOTAL SCORE: 0
TOTAL SCORE: 0
EVER HAD A DRINK FIRST THING IN THE MORNING TO STEADY YOUR NERVES TO GET RID OF A HANGOVER: NO
HOW MANY TIMES IN THE PAST YEAR HAVE YOU HAD 5 OR MORE DRINKS IN A DAY: 0
EVER FELT BAD OR GUILTY ABOUT YOUR DRINKING: NO
HAVE PEOPLE ANNOYED YOU BY CRITICIZING YOUR DRINKING: NO
ALCOHOL_USE: NO
AVERAGE NUMBER OF DAYS PER WEEK YOU HAVE A DRINK CONTAINING ALCOHOL: 0
TOTAL SCORE: 0
HAVE YOU EVER FELT YOU SHOULD CUT DOWN ON YOUR DRINKING: NO
CONSUMPTION TOTAL: NEGATIVE
DOES PATIENT WANT TO STOP DRINKING: NO

## 2024-06-24 ASSESSMENT — PATIENT HEALTH QUESTIONNAIRE - PHQ9
1. LITTLE INTEREST OR PLEASURE IN DOING THINGS: NOT AT ALL
2. FEELING DOWN, DEPRESSED, IRRITABLE, OR HOPELESS: NOT AT ALL
SUM OF ALL RESPONSES TO PHQ9 QUESTIONS 1 AND 2: 0

## 2024-06-24 ASSESSMENT — FIBROSIS 4 INDEX
FIB4 SCORE: 1.6
FIB4 SCORE: 1.71
FIB4 SCORE: 2.63

## 2024-06-24 ASSESSMENT — PAIN DESCRIPTION - PAIN TYPE
TYPE: ACUTE PAIN

## 2024-06-24 NOTE — ED NOTES
Med Rec complete per patient's family at bedside and RX bottles (returned)   Allergies reviewed  Antibiotics in the past 30 days:yes  Anticoagulant in past 14 days:no  Pharmacy patient utilizes:[facility] [employer] on [address]

## 2024-06-24 NOTE — ED PROVIDER NOTES
ER Provider Note    Scribed for Margarita Emery M.d. by  Carolynn Leal. 6/24/2024  2:31 PM    Primary Care Provider: Susi Gregorio M.D.    CHIEF COMPLAINT  Chief Complaint   Patient presents with    Back Pain     Started Friday and gotten worse today; unable to ambulate alone; but can transfer to chair with assistance. H/o L5 fx last April     LIMITATION TO HISTORY   Select: None.    HPI/ROS  OUTSIDE HISTORIAN(S):  Significant other Wife    EXTERNAL RECORDS REVIEWED  Outpatient Notes cardiology note reviewed from May 29.  Patient has aortic valve stenosis and had 1 episode of nonsustained ventricular tachycardia. patient was hospitalized in April for cholecystectomy.    Victoriano Vasquez is a 84 y.o. male who presents to the ED via EMS for evaluation of back pain onset 3 days ago. The patient has history of a fall that happened last April, where the patient sustained a L2 fracture. The patient's wife reports that the patient had physical therapy on Thursday and Friday. On Saturday morning, the patient was unable to ambulate, even with the help of his walker.  Patient says he cannot ambulate secondary to pain he denies any weakness.  The patient had a doctor's appointment today, but the patient missed it secondary to the patient not being able to ambulate. The registered nurse from the patient's doctor's office called the patient for a no show and was instructed to come to the emergency department for possible surgery. The patient notes that whenever he tries to ambulate, the pain gets worse. The patient denies any urinary retention, urinary frequency, or incontinence. The patient denies any recent fall or trauma. The patient's wife reports that the patient is currently taking tramadol and Bactrim. The wife states that the patient had an infection and needed to have the purulent drainage removed, but she was unsure of the specifics of the infection.     PAST MEDICAL HISTORY  History reviewed. No  "pertinent past medical history.    SURGICAL HISTORY  Past Surgical History:   Procedure Laterality Date    GUSTAVO BY LAPAROSCOPY N/A 4/28/2024    Procedure: CHOLECYSTECTOMY, LAPAROSCOPIC;  Surgeon: Tomas Rodrigues M.D.;  Location: [facility]GERY [facility];  Service: General    CHOLANGIOGRAMS N/A 4/28/2024    Procedure: ATTEMPTED INTRA-OPERATIVE CHOLANGIOGRAM;  Surgeon: Tomas Rodrigues M.D.;  Location: SURGERY [facility];  Service: General     FAMILY HISTORY  No family history noted.    SOCIAL HISTORY   reports that he has never smoked. He has never used smokeless tobacco. He reports that he does not drink alcohol and does not use drugs.    CURRENT MEDICATIONS  Current Discharge Medication List        CONTINUE these medications which have NOT CHANGED    Details   atorvastatin (LIPITOR) 40 MG Tab Take 40 mg by mouth every evening.      sulfamethoxazole-trimethoprim (BACTRIM DS) 800-160 MG tablet Take 1 Tablet by mouth 2 times a day. X 14 days      traMADol (ULTRAM) 50 MG Tab Take 50 mg by mouth every 6 hours as needed for Mild Pain.      levothyroxine (SYNTHROID) 100 MCG Tab Take 100 mcg by mouth every morning on an empty stomach.      amLODIPine (NORVASC) 5 MG Tab Take 5 mg by mouth every day.      metoprolol SR (TOPROL XL) 25 MG TABLET SR 24 HR Take 25 mg by mouth 2 times a day.           ALLERGIES  Patient has no known allergies.    PHYSICAL EXAM  /63   Pulse 91   Temp 36.7 °C (98 °F) (Temporal)   Resp 17   Ht 1.626 m (5' 4\")   Wt 77.6 kg (171 lb)   SpO2 94%   BMI 29.35 kg/m²     Constitutional: Alert in no apparent distress.  HENT: No signs of trauma, Bilateral external ears normal, Nose normal.   Eyes: Pupils are equal and reactive, Conjunctiva normal, Non-icteric.   Neck: Normal range of motion, No tenderness, Supple, No stridor.   Cardiovascular: Regular rate and rhythm, no murmurs.   Thorax & Lungs: Normal breath sounds, No respiratory distress, No wheezing, No chest tenderness.   Abdomen: " Bowel sounds normal, Soft, No tenderness, No masses, No peritoneal signs.  Skin: Warm, Dry, No erythema, No rash.   Unable to visualize back secondary to patient's pain with change in position  Musculoskeletal:  No major deformities noted.   Neurologic: Alert, moving all extremities without difficulty, no focal deficits.       DIAGNOSTIC STUDIES & PROCEDURES    Labs:   Results for orders placed or performed during the hospital encounter of 06/24/24   CBC WITH DIFFERENTIAL   Result Value Ref Range    WBC 10.2 4.8 - 10.8 K/uL    RBC 4.59 (L) 4.70 - 6.10 M/uL    Hemoglobin 13.8 (L) 14.0 - 18.0 g/dL    Hematocrit 41.3 (L) 42.0 - 52.0 %    MCV 90.0 81.4 - 97.8 fL    MCH 30.1 27.0 - 33.0 pg    MCHC 33.4 32.3 - 36.5 g/dL    RDW 57.5 (H) 35.9 - 50.0 fL    Platelet Count 308 164 - 446 K/uL    MPV 9.8 9.0 - 12.9 fL    Neutrophils-Polys 79.10 (H) 44.00 - 72.00 %    Lymphocytes 13.40 (L) 22.00 - 41.00 %    Monocytes 6.10 0.00 - 13.40 %    Eosinophils 0.40 0.00 - 6.90 %    Basophils 0.40 0.00 - 1.80 %    Immature Granulocytes 0.60 0.00 - 0.90 %    Nucleated RBC 0.00 0.00 - 0.20 /100 WBC    Neutrophils (Absolute) 8.07 (H) 1.82 - 7.42 K/uL    Lymphs (Absolute) 1.37 1.00 - 4.80 K/uL    Monos (Absolute) 0.62 0.00 - 0.85 K/uL    Eos (Absolute) 0.04 0.00 - 0.51 K/uL    Baso (Absolute) 0.04 0.00 - 0.12 K/uL    Immature Granulocytes (abs) 0.06 0.00 - 0.11 K/uL    NRBC (Absolute) 0.00 K/uL   COMP METABOLIC PANEL   Result Value Ref Range    Sodium 130 (L) 135 - 145 mmol/L    Potassium 4.2 3.6 - 5.5 mmol/L    Chloride 98 96 - 112 mmol/L    Co2 19 (L) 20 - 33 mmol/L    Anion Gap 13.0 7.0 - 16.0    Glucose 98 65 - 99 mg/dL    Bun 14 8 - 22 mg/dL    Creatinine 0.82 0.50 - 1.40 mg/dL    Calcium 9.0 8.5 - 10.5 mg/dL    Correct Calcium 9.7 8.5 - 10.5 mg/dL    AST(SGOT) 28 12 - 45 U/L    ALT(SGPT) 21 2 - 50 U/L    Alkaline Phosphatase 178 (H) 30 - 99 U/L    Total Bilirubin 0.6 0.1 - 1.5 mg/dL    Albumin 3.1 (L) 3.2 - 4.9 g/dL    Total Protein 7.3  6.0 - 8.2 g/dL    Globulin 4.2 (H) 1.9 - 3.5 g/dL    A-G Ratio 0.7 g/dL   APTT   Result Value Ref Range    APTT 24.2 (L) 24.7 - 36.0 sec   PROTHROMBIN TIME (INR)   Result Value Ref Range    PT 14.5 12.0 - 14.6 sec    INR 1.12 0.87 - 1.13   ESTIMATED GFR   Result Value Ref Range    GFR (CKD-EPI) 85 >60 mL/min/1.73 m 2      All labs reviewed by me.      COURSE & MEDICAL DECISION MAKING    ED Observation Status? No; Patient does not meet criteria for ED Observation.     2:31 PM - Patient seen and evaluated at bedside. Ordered UA Culture, Prothrombin Time, APTT, CMP, and CBC w/ Diff. to evaluate. He understands and agrees to the plan of care.     2:55 PM - Spoke with Dr. Christensen, Neuro Surgery, about the patient's condition. The patient has a complex L2 fracture and needs a fusion. Dr. Christensen would like me to do a lab work up and get a CT of the patient's L-spine. He also would like me to admit the patient to medicine and he said he will come and see the patient later on.     2:57 PM - Ordered CT-Lspine w/o.     3:18 PM - I discussed the patient's case and the above findings with [facility] who will admit the patient for further care and evaluation.     INITIAL ASSESSMENT AND PLAN  Care Narrative: This is an 84-year-old gentleman who presents for continued back pain and inability to ambulate secondary to pain.  I did speak with Dr. Christensen neurosurgery who reported that he has a complex L2 fracture and has had MRI at [facility] but needs to be hospitalized for likely T12-L2 fusion.  He did request repeat L-spine T scan which is pending at time of hospitalization.  Basic labs are obtained I spoke with the family medicine team who will hospitalize him for back surgery.               DISPOSITION AND DISCUSSIONS  I have discussed management of the patient with the following physicians and FREDERICK's: Dr. Christensen (Neuro Surgery) and [facility]    Discussion of management with other Hospitals in [place] or appropriate source(s): None        DISPOSITION:  Patient will be hospitalized by Dr. John in guarded condition.     FINAL IMPRESSION   1. Closed compression fracture of L2 lumbar vertebra, sequela       I, Carolynn Leal (Scribe), am scribing for, and in the presence of, Margarita Emery M.D..    Electronically signed by: Carolynn Leal (Scribe), 6/24/2024    IMargarita M.D. personally performed the services described in this documentation, as scribed by Carolynn Leal in my presence, and it is both accurate and complete.     The note accurately reflects work and decisions made by me.  Margarita Emery M.D.  6/24/2024  8:49 PM

## 2024-06-24 NOTE — ED TRIAGE NOTES
"Chief Complaint   Patient presents with    Back Pain     Started Friday and gotten worse today; unable to ambulate alone; but can transfer to chair with assistance. H/o L5 fx last April     BIB EMS from home; patient has a doctor's appt today but missed it as pt. is unable to get up and ambulate. RN from his doctor's office called them for no show up and was told to come to ED for possible OR. H/o of fall last April and sustained a L5  fracture; Wife at bedside states they went for a PT last Thurs and Friday  morning patient unable to make a step or  walk anymore even with the help of his walker.  was used; patient is Malian speaking only    /63   Pulse 91   Temp 36.7 °C (98 °F) (Temporal)   Resp 17   Ht 1.626 m (5' 4\")   Wt 77.6 kg (171 lb)   SpO2 94%   BMI 29.35 kg/m²     "

## 2024-06-24 NOTE — ED NOTES
To floor via gurney with transport; AOX4 Gcs15; on room air; spouse at bedside; all belongings with patient

## 2024-06-25 ENCOUNTER — HOSPITAL ENCOUNTER (OUTPATIENT)
Dept: RADIOLOGY | Facility: MEDICAL CENTER | Age: 84
End: 2024-06-25
Attending: EMERGENCY MEDICINE

## 2024-06-25 PROBLEM — E78.5 HLD (HYPERLIPIDEMIA): Status: ACTIVE | Noted: 2024-06-25

## 2024-06-25 PROBLEM — D64.9 NORMOCYTIC ANEMIA: Status: ACTIVE | Noted: 2024-06-25

## 2024-06-25 LAB
ANION GAP SERPL CALC-SCNC: 12 MMOL/L (ref 7–16)
BUN SERPL-MCNC: 11 MG/DL (ref 8–22)
CALCIUM SERPL-MCNC: 8.8 MG/DL (ref 8.5–10.5)
CHLORIDE SERPL-SCNC: 100 MMOL/L (ref 96–112)
CO2 SERPL-SCNC: 21 MMOL/L (ref 20–33)
CREAT SERPL-MCNC: 0.64 MG/DL (ref 0.5–1.4)
ERYTHROCYTE [DISTWIDTH] IN BLOOD BY AUTOMATED COUNT: 56.6 FL (ref 35.9–50)
GFR SERPLBLD CREATININE-BSD FMLA CKD-EPI: 92 ML/MIN/1.73 M 2
GLUCOSE SERPL-MCNC: 86 MG/DL (ref 65–99)
HCT VFR BLD AUTO: 38.3 % (ref 42–52)
HGB BLD-MCNC: 12.9 G/DL (ref 14–18)
MCH RBC QN AUTO: 30.1 PG (ref 27–33)
MCHC RBC AUTO-ENTMCNC: 33.7 G/DL (ref 32.3–36.5)
MCV RBC AUTO: 89.3 FL (ref 81.4–97.8)
PLATELET # BLD AUTO: 295 K/UL (ref 164–446)
PMV BLD AUTO: 9.7 FL (ref 9–12.9)
POTASSIUM SERPL-SCNC: 4.1 MMOL/L (ref 3.6–5.5)
RBC # BLD AUTO: 4.29 M/UL (ref 4.7–6.1)
SODIUM SERPL-SCNC: 133 MMOL/L (ref 135–145)
WBC # BLD AUTO: 8.4 K/UL (ref 4.8–10.8)

## 2024-06-25 PROCEDURE — 80048 BASIC METABOLIC PNL TOTAL CA: CPT

## 2024-06-25 PROCEDURE — 700102 HCHG RX REV CODE 250 W/ 637 OVERRIDE(OP)

## 2024-06-25 PROCEDURE — 85027 COMPLETE CBC AUTOMATED: CPT

## 2024-06-25 PROCEDURE — A9270 NON-COVERED ITEM OR SERVICE: HCPCS

## 2024-06-25 PROCEDURE — 700111 HCHG RX REV CODE 636 W/ 250 OVERRIDE (IP)

## 2024-06-25 PROCEDURE — 770006 HCHG ROOM/CARE - MED/SURG/GYN SEMI*

## 2024-06-25 PROCEDURE — 99222 1ST HOSP IP/OBS MODERATE 55: CPT | Mod: AI,GC | Performed by: FAMILY MEDICINE

## 2024-06-25 PROCEDURE — 36415 COLL VENOUS BLD VENIPUNCTURE: CPT

## 2024-06-25 PROCEDURE — 72131 CT LUMBAR SPINE W/O DYE: CPT

## 2024-06-25 RX ADMIN — OXYCODONE 5 MG: 5 TABLET ORAL at 05:53

## 2024-06-25 RX ADMIN — AMLODIPINE BESYLATE 5 MG: 5 TABLET ORAL at 05:50

## 2024-06-25 RX ADMIN — METOPROLOL SUCCINATE 25 MG: 25 TABLET, EXTENDED RELEASE ORAL at 05:50

## 2024-06-25 RX ADMIN — ATORVASTATIN CALCIUM 40 MG: 40 TABLET, FILM COATED ORAL at 16:46

## 2024-06-25 RX ADMIN — LEVOTHYROXINE SODIUM 100 MCG: 0.1 TABLET ORAL at 05:50

## 2024-06-25 RX ADMIN — METOPROLOL SUCCINATE 25 MG: 25 TABLET, EXTENDED RELEASE ORAL at 16:46

## 2024-06-25 RX ADMIN — HEPARIN SODIUM 5000 UNITS: 5000 INJECTION, SOLUTION INTRAVENOUS; SUBCUTANEOUS at 21:52

## 2024-06-25 RX ADMIN — HEPARIN SODIUM 5000 UNITS: 5000 INJECTION, SOLUTION INTRAVENOUS; SUBCUTANEOUS at 13:16

## 2024-06-25 RX ADMIN — SENNOSIDES AND DOCUSATE SODIUM 2 TABLET: 50; 8.6 TABLET ORAL at 16:46

## 2024-06-25 ASSESSMENT — PAIN DESCRIPTION - PAIN TYPE
TYPE: ACUTE PAIN

## 2024-06-25 NOTE — PROGRESS NOTES
Neurosurgery Progress Note    Subjective:  Pt awake, alert  Primarily Croatian speaking  States he is doing much better  No leg pain     Exam:  LE str 5/5     BP  Min: 113/58  Max: 134/59  Pulse  Av.3  Min: 70  Max: 91  Resp  Av.1  Min: 16  Max: 18  Temp  Av.3 °C (97.4 °F)  Min: 36.1 °C (97 °F)  Max: 36.7 °C (98 °F)  SpO2  Av.7 %  Min: 92 %  Max: 96 %    No data recorded    Recent Labs     24  1511 24  0256   WBC 10.2 8.4   RBC 4.59* 4.29*   HEMOGLOBIN 13.8* 12.9*   HEMATOCRIT 41.3* 38.3*   MCV 90.0 89.3   MCH 30.1 30.1   MCHC 33.4 33.7   RDW 57.5* 56.6*   PLATELETCT 308 295   MPV 9.8 9.7     Recent Labs     24  1511 24  0256   SODIUM 130* 133*   POTASSIUM 4.2 4.1   CHLORIDE 98 100   CO2 19* 21   GLUCOSE 98 86   BUN 14 11   CREATININE 0.82 0.64   CALCIUM 9.0 8.8     Recent Labs     24  151   APTT 24.2*   INR 1.12           Intake/Output       None            No intake or output data in the 24 hours ending 24 0852          [Held by provider] heparin  5,000 Units Q8HRS    acetaminophen  650 mg Q6HRS PRN    Pharmacy Consult Request  1 Each PHARMACY TO DOSE    oxyCODONE immediate-release  2.5 mg Q3HRS PRN    Or    oxyCODONE immediate-release  5 mg Q3HRS PRN    Or    HYDROmorphone  0.25 mg Q3HRS PRN    senna-docusate  2 Tablet Q EVENING    And    polyethylene glycol/lytes  1 Packet QDAY PRN    labetalol  10 mg Q4HRS PRN    amLODIPine  5 mg DAILY    atorvastatin  40 mg Q EVENING    levothyroxine  100 mcg AM ES    metoprolol SR  25 mg BID       Assessment and Plan:  Hospital day #1 L2 burst fx   POD #na  Prophylactic anticoagulation: no         Start date/time: tbd  Repeat Lumbar CT completed - Fermin to review   OR sometime this week for T12-L4 fusion   Pain control - TLSO when oob

## 2024-06-25 NOTE — ASSESSMENT & PLAN NOTE
S/p T12-L4 fusion on 6/27 by neurosurgery. PCA pump Dc'd 6/28, pt not in sig pain. Hemovac removed 6/29.   Plan:   -  Tylenol 1000 mg q6h prn, oxycodone 5-10 mg q4h PRN, IV morphine 2 mg q3 hours prn, Tizanidine 2 mg TID PRN  - PT consulted recommend acute placement, pending PMR eval   - Neurosurgery recommendations: No DVT PPx, TLSO brace when patient is up and walking.   - Fall precautions  - Regular diet, advance as tolerated  - MiraLAX BID, senna-docusate daily, patient needs to have BM.

## 2024-06-25 NOTE — CARE PLAN
The patient is Stable - Low risk of patient condition declining or worsening    Shift Goals  Clinical Goals: pain control, safety, plan of care updates  Patient Goals: pain control  Family Goals: pain control    Progress made toward(s) clinical / shift goals:  Plan of care reviewed with patient and family at bedside, bed alarm and falls precautions in place, pain controlled aware has prn medication as reviewed.     Patient is not progressing towards the following goals: Patient accidentally removed PIV, replaced and wrapped lightly with kerlix believed it got caught on the linen which aided in its removal.

## 2024-06-25 NOTE — CONSULTS
DATE OF SERVICE:  06/24/2024     NEUROSURGICAL CONSULTATION     HISTORY OF PRESENT ILLNESS:  The patient is a very pleasant 84-year-old male   who was diagnosed with a fracture several weeks ago and was managed   nonoperatively. He went to rehab and was sent home and was supposed to follow   up with me in the office today.  He called and said he was unable to come to   the office, so encouraged him to go to the emergency room.  He had to call EMS   to bring him to the emergency room as he was unable to ambulate.  On further   questioning, he says he is really just having back pain.  No weakness, no   significant numbness or tingling but he feels like his pain is getting worse.    He originally had a fall in April and sustained an L2 burst fracture.  He had   physical therapy, Thursday and Friday and then on Saturday morning was unable   to ambulate even with the help of his walker.     PAST MEDICAL HISTORY:  Coronary artery disease, nonsustained VT when he was   hospitalized in April for cholecystectomy and aortic valve stenosis.     PAST SURGICAL HISTORY:  Laparoscopic cholecystectomy in April.     FAMILY HISTORY:  Noncontributory.     SOCIAL HISTORY:  No smoking, no ETOH, no illicits.     MEDICATIONS:  Atorvastatin, Bactrim, tramadol, levothyroxine, Norvasc and   metoprolol.     LABORATORY VALUES:  CBC significant for hemoglobin 13.8, remainder within   normal limits.  Basic metabolic panel significant for sodium 130, bicarbonate   of 19.  Remainder within normal limits.  INR and PTT are normal.     IMAGING:  CT and MRI of the L-spine from his previous admission demonstrates   L2 burst fracture with severe central canal stenosis. Repeat lumbar spine CT   scan is pending.     PHYSICAL EXAMINATION:    GENERAL:  Awake, alert and oriented x3, GCS of 15.  No distress.  HEENT:  Pupils equal, round, reactive to light.  Extraocular muscles intact.    Tongue midline.  Face symmetric.  NEUROLOGIC:  Motor is 5/5 strength in  all muscle groups in the upper and lower   extremities.  No drift.  Sensory grossly intact to light touch.     PLAN:  Given the patient's worsening kyphotic deformity with his burst   fracture and worsening pain, I think he will need surgical intervention. We   will need to get a repeat CT scan to discuss the actual plan, but we will   likely recommend T12 to L4 decompression and fusion with instrumentation for   L2 burst fracture. We will follow the patient closely.        ______________________________  Ebenezer Christensen MD    CPD/SHIELDS    DD:  06/24/2024 22:17  DT:  06/24/2024 22:36    Job#:  [id number]

## 2024-06-25 NOTE — THERAPY
Occupational Therapy Contact Note    Patient Name: Victoriano Vasquez  Age:  84 y.o., Sex:  male  Medical Record #: [id number]  Today's Date: 6/25/2024    OT orders received. Per Neurosurgery, pt is pending further imaging with possible surgical intervention. Will hold OT eval and complete once POC has been established.

## 2024-06-25 NOTE — PROGRESS NOTES
4 Eyes Skin Assessment Completed by KENN Galeano and KENN Doherty.    Head WDL  Ears WDL  Nose WDL  Mouth WDL  Neck WDL  Breast/Chest WDL  Shoulder Blades WDL  Spine WDL  (R) Arm/Elbow/Hand WDL  (L) Arm/Elbow/Hand WDL  Abdomen WDL  Groin WDL  Scrotum/Coccyx/Buttocks WDL  (R) Leg WDL  (L) Leg WDL  (R) Heel/Foot/Toe WDL  (L) Heel/Foot/Toe WDL          Devices In Places NA    Interventions In Place N/A    Possible Skin Injury No    Pictures Uploaded Into Epic N/A  Wound Consult Placed N/A  RN Wound Prevention Protocol Ordered No

## 2024-06-25 NOTE — H&P
[facility] MEDICINE H&P        PATIENT ID:  NAME:  Victoriano Vasquez  MRN:               [id number]  YOB: 1940    Date of Admission: 6/24/2024     Attending: Stevie Partida M.D.     Resident: Ioana Méndez M.D.    Primary Care Physician:  Susi Gregorio M.D.    CC:  back pain    HPI: Victoriano Vasquez is a 84 y.o. male who presented to the ER at the request of his outside neurosurgeon. Patient reports that he was suppose to follow up with his neurosurgeon outpatient today who advised patient be seen here as patient could not ambulate well. Patient has known L5 fracture. Patient with three days of worsening back pain, yesterday was unable to get out of bed d/t pain, today he feels somewhat better.  He reports his pain at baseline of 4 out of 10, however if he attempts to move suddenly it can be 8 out of 10.  Patient able to ambulate.    Patient experienced a fall 2 month ago as he tripped on stairs at home, and concern his back pain started; he then had a subsequent fall 1 months ago while visiting [place]; neither falls were associated with loss of consciousness, palpitations, dizziness, striking of the head; they seem mechanical in nature per patient report.    Patient reports no headache, fever or chills, chest pain, shortness of breath, palpitations, N/V/D, incontinence, loss of sensation or weakness in lower extremities.    ERCourse:  Neurosurgery consulted and will see patient in the morning.    REVIEW OF SYSTEMS:   Ten systems reviewed and were negative except as noted in the HPI.                PAST MEDICAL HISTORY:  History reviewed. No pertinent past medical history.    PAST SURGICAL HISTORY:  Past Surgical History:   Procedure Laterality Date    GUSTAVO BY LAPAROSCOPY N/A 4/28/2024    Procedure: CHOLECYSTECTOMY, LAPAROSCOPIC;  Surgeon: Tomas Rodrigues M.D.;  Location: SURGERY [facility];  Service: General    CHOLANGIOGRAMS N/A 4/28/2024    Procedure: ATTEMPTED INTRA-OPERATIVE  CHOLANGIOGRAM;  Surgeon: Tomas Rodrigues M.D.;  Location: SURGERY [facility];  Service: General     FAMILY HISTORY:  No family history on file.    SOCIAL HISTORY:   Pt lives in home w/ wife  Smoking neg  Etoh use neg  Drug use neg    DIET:   Orders Placed This Encounter   Procedures    Diet NPO Restrict to: Sips with Medications     Standing Status:   Standing     Number of Occurrences:   1     Order Specific Question:   Diet NPO Restrict to:     Answer:   Sips with Medications [3]       ALLERGIES:  No Known Allergies    OUTPATIENT MEDICATIONS:    Current Facility-Administered Medications:     heparin injection 5,000 Units, 5,000 Units, Subcutaneous, Q8HRS, Michell Hui M.D.    acetaminophen (Tylenol) tablet 650 mg, 650 mg, Oral, Q6HRS PRN, Michell Hui M.D.    Notify provider if pain remains uncontrolled, , , CONTINUOUS **AND** Use the Numeric Rating Scale (NRS), Goodwin-Baker Faces (WBF), or FLACC on regular floors and Critical-Care Pain Observation Tool (CPOT) on ICUs/Trauma to assess pain, , , CONTINUOUS **AND** Pulse Ox, , , CONTINUOUS **AND** Pharmacy Consult Request ...Pain Management Review 1 Each, 1 Each, Other, PHARMACY TO DOSE **AND** If patient difficult to arouse and/or has respiratory depression (respiratory rate of 10 or less), stop any opiates that are currently infusing and call a Rapid Response., , , CONTINUOUS, Michell Hui M.D.    oxyCODONE immediate-release (Roxicodone) tablet 2.5 mg, 2.5 mg, Oral, Q3HRS PRN **OR** oxyCODONE immediate-release (Roxicodone) tablet 5 mg, 5 mg, Oral, Q3HRS PRN, 5 mg at 06/24/24 2244 **OR** HYDROmorphone (Dilaudid) injection 0.25 mg, 0.25 mg, Intravenous, Q3HRS PRN, Michell Hui M.D.    senna-docusate (Pericolace Or Senokot S) 8.6-50 MG per tablet 2 Tablet, 2 Tablet, Oral, Q EVENING, 2 Tablet at 06/24/24 9921 **AND** polyethylene glycol/lytes (Miralax) Packet 1 Packet, 1 Packet, Oral, QDAY PRN, Michell Hui M.D.    labetalol  (Normodyne/Trandate) injection 10 mg, 10 mg, Intravenous, Q4HRS PRN, Michell Hui M.D.    amLODIPine (Norvasc) tablet 5 mg, 5 mg, Oral, DAILY, Ioana Méndez M.D.    atorvastatin (Lipitor) tablet 40 mg, 40 mg, Oral, Q EVENING, Ioana Méndez M.D., 40 mg at 24 175    levothyroxine (Synthroid) tablet 100 mcg, 100 mcg, Oral, AM ES, Ioana Méndez M.D.    metoprolol SR (Toprol XL) tablet 25 mg, 25 mg, Oral, BID, Ioana Méndez M.D., 25 mg at 24 175    PHYSICAL EXAM:  Vitals:    24 1500 24 1608 24 1658 24 2035   BP: 113/58 116/58  122/58   Pulse: 78 81  76   Resp: 18 16  17   Temp:  36.1 °C (97 °F)  36.4 °C (97.5 °F)   TempSrc:  Temporal  Temporal   SpO2: 93% 93%  94%   Weight:   80.3 kg (177 lb)    Height:       , Temp (24hrs), Av.4 °C (97.5 °F), Min:36.1 °C (97 °F), Max:36.7 °C (98 °F)  , Pulse Oximetry: 94 %, O2 (LPM): 0, O2 Delivery Device: None - Room Air    General: Pt resting in NAD, cooperative   Skin:  Pink, warm and dry.  No rashes  HEENT: NC/AT. PERRL. EOMI. MMM. No nasal discharge. Oropharynx nonerythematous without exudate/plaques  Neck:  Supple without lymphadenopathy or rigidity. No JVD   Lungs:  Symmetrical.  CTAB with no W/R/R.  Good air movement   Cardiovascular:  S1/S2 RRR without M/R/G.  Abdomen:  Abdomen is soft NT/ND. +BS. No masses noted.  Extremities:  Full range of motion. No gross deformities noted. 2+ pulses in all extremities. No C/C/E   Spine:  Lumbar region ttp midline; sensation intact throughout LE, strength 5/5; Straight without vertebral anomalies.  CNS:  Muscle tone is normal. Cranial nerves II-XII grossly intact.    LAB TESTS:   Recent Labs     24  1511   WBC 10.2   RBC 4.59*   HEMOGLOBIN 13.8*   HEMATOCRIT 41.3*   MCV 90.0   MCH 30.1   RDW 57.5*   PLATELETCT 308   MPV 9.8   NEUTSPOLYS 79.10*   LYMPHOCYTES 13.40*   MONOCYTES 6.10   EOSINOPHILS 0.40   BASOPHILS 0.40         Recent Labs     24  1511   SODIUM 130*    POTASSIUM 4.2   CHLORIDE 98   CO2 19*   BUN 14   CREATININE 0.82   CALCIUM 9.0   ALBUMIN 3.1*       CULTURES:   Results       Procedure Component Value Units Date/Time    URINALYSIS CULTURE, IF INDICATED [[id number]]     Order Status: Sent Specimen: Urine, Clean Catch           IMAGES:  CT-LSPINE W/O PLUS RECONS    (Results Pending)     CONSULTS:   Neurosurgery     ASSESSMENT/PLAN: 84 y.o. male admitted for lumbar spine fracture, sent by outpt surgeon.     * Lumbar compression fracture, closed, initial encounter (HCC)- (present on admission)  Assessment & Plan  Status post GLF, no LOC, no head strike; patient sent into ED by outside physician to be evaluated for lower lumbar fracture; CT scan images uploaded, official read pending however per my review L2 has significant anomaly consistent with likely fractures;  Dr. Christensen from neurosurgery consulted per ED physician and will be seeing patient in the morning; not on blood thinners    Plan:  - Admit to hospital floor  - Pain management as needed, although patient's pain tolerable per her report  - Monitor for signs symptom progression  - PT consult  - Will likely need placement if taken to the OR this admission  - Follow neurosurgery recommendations  - Fall precautions  - N.p.o. at midnight    HLD (hyperlipidemia)  Assessment & Plan  Continue home atorvastatin    Primary hypertension- (present on admission)  Assessment & Plan  Continue home amlodipine  Continue home metoprolol    Hypothyroidism- (present on admission)  Assessment & Plan  Continue home levothyroxine        Code Status: Full    Dispo: To [facility] service;Inpatient, pending neurosurgery consult.

## 2024-06-25 NOTE — THERAPY
Physical Therapy Contact Note    Patient Name: Victoriano Vasquez  Age:  84 y.o., Sex:  male  Medical Record #: [id number]  Today's Date: 6/25/2024 06/25/24 0728   Initial Contact Note    Initial Contact Note Order Received and Verified, Physical Therapy Evaluation in Progress with Full Report to Follow.   Interdisciplinary Plan of Care Collaboration   Collaboration Comments PT order received.  Pt is pending further imaging and possible surgery.  PT will follow up once POC is established.   Session Information   Date / Session Number  6/25- pending POC  (EVAL)

## 2024-06-25 NOTE — CARE PLAN
Problem: Pain - Standard  Goal: Alleviation of pain or a reduction in pain to the patient’s comfort goal  Outcome: Progressing     Problem: Knowledge Deficit - Standard  Goal: Patient and family/care givers will demonstrate understanding of plan of care, disease process/condition, diagnostic tests and medications  Outcome: Progressing     Problem: Fall Risk  Goal: Patient will remain free from falls  Outcome: Progressing    The patient is Stable - Low risk of patient condition declining or worsening    Shift Goals  Clinical Goals: CT scan request, NPO for possible surgery- 6/25/2024, Pain management, safety  Patient Goals: CT result, Maintained NPO status, Pain control, comfort  Family Goals: Not present    Progress made toward(s) clinical / shift goals: Maintained NPO post 12 midnight for possible surgery- 6/25/2024. Advised and instructed not to drink/ eat anything. Placed NPO tag at the outside door. Verbalized understanding. Reduction of pain. Verbalized understanding towards the side effects of the medication provide. Placed bed side table and call bell within reach, placed bed in a lowest possible position, side rails up x2, kept safe and comfortably

## 2024-06-26 ENCOUNTER — APPOINTMENT (OUTPATIENT)
Dept: RADIOLOGY | Facility: MEDICAL CENTER | Age: 84
DRG: 460 | End: 2024-06-26
Payer: MEDICARE

## 2024-06-26 PROBLEM — R41.0 DELIRIUM: Status: ACTIVE | Noted: 2024-06-26

## 2024-06-26 LAB
AMMONIA PLAS-SCNC: 12 UMOL/L (ref 11–45)
ANION GAP SERPL CALC-SCNC: 19 MMOL/L (ref 7–16)
BUN SERPL-MCNC: 14 MG/DL (ref 8–22)
CALCIUM SERPL-MCNC: 9.2 MG/DL (ref 8.5–10.5)
CHLORIDE SERPL-SCNC: 98 MMOL/L (ref 96–112)
CK SERPL-CCNC: 37 U/L (ref 0–154)
CO2 SERPL-SCNC: 17 MMOL/L (ref 20–33)
CREAT SERPL-MCNC: 0.77 MG/DL (ref 0.5–1.4)
EKG IMPRESSION: NORMAL
ERYTHROCYTE [DISTWIDTH] IN BLOOD BY AUTOMATED COUNT: 55.3 FL (ref 35.9–50)
EST. AVERAGE GLUCOSE BLD GHB EST-MCNC: 111 MG/DL
GFR SERPLBLD CREATININE-BSD FMLA CKD-EPI: 87 ML/MIN/1.73 M 2
GLUCOSE SERPL-MCNC: 93 MG/DL (ref 65–99)
HBA1C MFR BLD: 5.5 % (ref 4–5.6)
HCT VFR BLD AUTO: 41.1 % (ref 42–52)
HGB BLD-MCNC: 13.8 G/DL (ref 14–18)
LACTATE SERPL-SCNC: 1.6 MMOL/L (ref 0.5–2)
MAGNESIUM SERPL-MCNC: 1.9 MG/DL (ref 1.5–2.5)
MCH RBC QN AUTO: 30 PG (ref 27–33)
MCHC RBC AUTO-ENTMCNC: 33.6 G/DL (ref 32.3–36.5)
MCV RBC AUTO: 89.3 FL (ref 81.4–97.8)
PHOSPHATE SERPL-MCNC: 3.5 MG/DL (ref 2.5–4.5)
PLATELET # BLD AUTO: 339 K/UL (ref 164–446)
PMV BLD AUTO: 10.2 FL (ref 9–12.9)
POTASSIUM SERPL-SCNC: 4.1 MMOL/L (ref 3.6–5.5)
RBC # BLD AUTO: 4.6 M/UL (ref 4.7–6.1)
SODIUM SERPL-SCNC: 134 MMOL/L (ref 135–145)
WBC # BLD AUTO: 9.1 K/UL (ref 4.8–10.8)

## 2024-06-26 PROCEDURE — 85027 COMPLETE CBC AUTOMATED: CPT

## 2024-06-26 PROCEDURE — A9270 NON-COVERED ITEM OR SERVICE: HCPCS

## 2024-06-26 PROCEDURE — 36415 COLL VENOUS BLD VENIPUNCTURE: CPT

## 2024-06-26 PROCEDURE — 83735 ASSAY OF MAGNESIUM: CPT

## 2024-06-26 PROCEDURE — 84100 ASSAY OF PHOSPHORUS: CPT

## 2024-06-26 PROCEDURE — 700102 HCHG RX REV CODE 250 W/ 637 OVERRIDE(OP)

## 2024-06-26 PROCEDURE — 83036 HEMOGLOBIN GLYCOSYLATED A1C: CPT

## 2024-06-26 PROCEDURE — 700101 HCHG RX REV CODE 250

## 2024-06-26 PROCEDURE — 82550 ASSAY OF CK (CPK): CPT

## 2024-06-26 PROCEDURE — 700111 HCHG RX REV CODE 636 W/ 250 OVERRIDE (IP)

## 2024-06-26 PROCEDURE — 99232 SBSQ HOSP IP/OBS MODERATE 35: CPT | Mod: GC | Performed by: FAMILY MEDICINE

## 2024-06-26 PROCEDURE — 80048 BASIC METABOLIC PNL TOTAL CA: CPT

## 2024-06-26 PROCEDURE — 82140 ASSAY OF AMMONIA: CPT

## 2024-06-26 PROCEDURE — 700105 HCHG RX REV CODE 258

## 2024-06-26 PROCEDURE — 93010 ELECTROCARDIOGRAM REPORT: CPT | Performed by: INTERNAL MEDICINE

## 2024-06-26 PROCEDURE — 51798 US URINE CAPACITY MEASURE: CPT

## 2024-06-26 PROCEDURE — 83605 ASSAY OF LACTIC ACID: CPT

## 2024-06-26 PROCEDURE — 770001 HCHG ROOM/CARE - MED/SURG/GYN PRIV*

## 2024-06-26 PROCEDURE — 71045 X-RAY EXAM CHEST 1 VIEW: CPT

## 2024-06-26 PROCEDURE — 93005 ELECTROCARDIOGRAM TRACING: CPT | Performed by: NURSE PRACTITIONER

## 2024-06-26 RX ORDER — SODIUM CHLORIDE, SODIUM LACTATE, POTASSIUM CHLORIDE, CALCIUM CHLORIDE 600; 310; 30; 20 MG/100ML; MG/100ML; MG/100ML; MG/100ML
INJECTION, SOLUTION INTRAVENOUS CONTINUOUS
Status: DISCONTINUED | OUTPATIENT
Start: 2024-06-26 | End: 2024-06-27

## 2024-06-26 RX ORDER — LIDOCAINE 4 G/G
2 PATCH TOPICAL EVERY 24 HOURS
Status: DISCONTINUED | OUTPATIENT
Start: 2024-06-26 | End: 2024-07-09 | Stop reason: HOSPADM

## 2024-06-26 RX ADMIN — SODIUM CHLORIDE, POTASSIUM CHLORIDE, SODIUM LACTATE AND CALCIUM CHLORIDE: 600; 310; 30; 20 INJECTION, SOLUTION INTRAVENOUS at 11:44

## 2024-06-26 RX ADMIN — ATORVASTATIN CALCIUM 40 MG: 40 TABLET, FILM COATED ORAL at 16:24

## 2024-06-26 RX ADMIN — AMLODIPINE BESYLATE 5 MG: 5 TABLET ORAL at 04:14

## 2024-06-26 RX ADMIN — LIDOCAINE 2 PATCH: 4 PATCH TOPICAL at 11:45

## 2024-06-26 RX ADMIN — METOPROLOL SUCCINATE 25 MG: 25 TABLET, EXTENDED RELEASE ORAL at 04:14

## 2024-06-26 RX ADMIN — HEPARIN SODIUM 5000 UNITS: 5000 INJECTION, SOLUTION INTRAVENOUS; SUBCUTANEOUS at 13:29

## 2024-06-26 RX ADMIN — METOPROLOL SUCCINATE 25 MG: 25 TABLET, EXTENDED RELEASE ORAL at 16:24

## 2024-06-26 RX ADMIN — OXYCODONE 5 MG: 5 TABLET ORAL at 08:01

## 2024-06-26 RX ADMIN — LEVOTHYROXINE SODIUM 100 MCG: 0.1 TABLET ORAL at 04:14

## 2024-06-26 RX ADMIN — SENNOSIDES AND DOCUSATE SODIUM 2 TABLET: 50; 8.6 TABLET ORAL at 16:24

## 2024-06-26 ASSESSMENT — PAIN DESCRIPTION - PAIN TYPE: TYPE: ACUTE PAIN

## 2024-06-26 ASSESSMENT — COGNITIVE AND FUNCTIONAL STATUS - GENERAL
DRESSING REGULAR UPPER BODY CLOTHING: A LOT
STANDING UP FROM CHAIR USING ARMS: A LOT
PERSONAL GROOMING: A LITTLE
TOILETING: A LOT
EATING MEALS: A LITTLE
MOVING FROM LYING ON BACK TO SITTING ON SIDE OF FLAT BED: A LOT
CLIMB 3 TO 5 STEPS WITH RAILING: A LOT
TURNING FROM BACK TO SIDE WHILE IN FLAT BAD: A LOT
DAILY ACTIVITIY SCORE: 14
MOBILITY SCORE: 12
WALKING IN HOSPITAL ROOM: A LOT
SUGGESTED CMS G CODE MODIFIER DAILY ACTIVITY: CK
DRESSING REGULAR LOWER BODY CLOTHING: A LOT
SUGGESTED CMS G CODE MODIFIER MOBILITY: CL
MOVING TO AND FROM BED TO CHAIR: A LOT
HELP NEEDED FOR BATHING: A LOT

## 2024-06-26 ASSESSMENT — PATIENT HEALTH QUESTIONNAIRE - PHQ9
1. LITTLE INTEREST OR PLEASURE IN DOING THINGS: NOT AT ALL
SUM OF ALL RESPONSES TO PHQ9 QUESTIONS 1 AND 2: 0
2. FEELING DOWN, DEPRESSED, IRRITABLE, OR HOPELESS: NOT AT ALL

## 2024-06-26 NOTE — PROGRESS NOTES
[facility] – [place] MEDICINE PROGRESS NOTE     Attending: Stevie John M.d.  Senior Resident: Debora Brumfield D.O. (PGY-2)  Hugo Resident: Michell Hui M.D. (PGY-1)  PATIENT: Victoriano Vasquez; [id number]; 1940    Hospital Day # Hospital Day: 3    ID: 84 y.o. male with past medical history of HLD, HTN, Hypothyroidism, was admitted on 6/24/2024 for L2 compression fracture, sent in by neurosurgery with plans for lumbar fusion.     24 Hour Events: No acute events overnight.    Subjective: Patient noted to be increasingly delirious this morning , A&Ox1, stated that he believed that he was here for a dental procedure for his wife.  Repeatedly tried to leave the room.  Was reporting increased lower back pain, however still had intact strength, motion, sensation of bilateral lower extremities.  No urinary or bowel incontinence.    OBJECTIVE:  Temp:  [36.2 °C (97.2 °F)-36.5 °C (97.7 °F)] 36.2 °C (97.2 °F)  Pulse:  [83-95] 93  Resp:  [16-18] 16  BP: (128-131)/(61-79) 131/61  SpO2:  [93 %-94 %] 93 %    Intake/Output Summary (Last 24 hours) at 6/25/2024 1802  Last data filed at 6/25/2024 0930  Gross per 24 hour   Intake 440 ml   Output 0 ml   Net 440 ml       PE:  Gen: No acute distress, resting comfortably in bed  HEENT: normocephalic, atraumatic, EOMI  Pulm: clear to auscultation bilaterally, no respiratory distress   Cardio: RRR, no M/R/G  Abdom: soft, nontender, nondistended, normoactive bowel sounds in all quadrants  Ext: No edema, 2+ pulses bilaterally  Spine: Tenderness to palpation over lumbar spine midline, bilateral lower extremity strength and sensation intact  Neuro: A&Ox1, no focal neurologic deficits    LABS:  Recent Labs     06/24/24  1511 06/25/24  0256 06/26/24  0847   WBC 10.2 8.4 9.1   RBC 4.59* 4.29* 4.60*   HEMOGLOBIN 13.8* 12.9* 13.8*   HEMATOCRIT 41.3* 38.3* 41.1*   MCV 90.0 89.3 89.3   MCH 30.1 30.1 30.0   RDW 57.5* 56.6* 55.3*   PLATELETCT 308 295 339   MPV 9.8 9.7 10.2   NEUTSPOLYS 79.10*  --    "--    LYMPHOCYTES 13.40*  --   --    MONOCYTES 6.10  --   --    EOSINOPHILS 0.40  --   --    BASOPHILS 0.40  --   --      Recent Labs     06/24/24  1511 06/25/24  0256 06/26/24  0547   SODIUM 130* 133* 134*   POTASSIUM 4.2 4.1 4.1   CHLORIDE 98 100 98   CO2 19* 21 17*   BUN 14 11 14   CREATININE 0.82 0.64 0.77   CALCIUM 9.0 8.8 9.2   ALBUMIN 3.1*  --   --      Estimated GFR/CRCL = Estimated Creatinine Clearance: 65.8 mL/min (by C-G formula based on SCr of 0.77 mg/dL).  Recent Labs     06/24/24  1511 06/25/24  0256 06/26/24  0547   GLUCOSE 98 86 93     Recent Labs     06/24/24  1511   ASTSGOT 28   ALTSGPT 21   TBILIRUBIN 0.6   ALKPHOSPHAT 178*   GLOBULIN 4.2*   INR 1.12             Recent Labs     06/24/24  1511   INR 1.12   APTT 24.2*       MICROBIOLOGY:   No results found for: \"BLOODCULTU\", \"BLDCULT\", \"BCHOLD\"     IMAGING:   CT-LSPINE W/O PLUS RECONS   Final Result         1. Burst fracture of L2 is again noted with severe canal stenosis.          MEDS:  Current Facility-Administered Medications   Medication Last Admin    heparin injection 5,000 Units 5,000 Units at 06/25/24 2152    acetaminophen (Tylenol) tablet 650 mg      Pharmacy Consult Request ...Pain Management Review 1 Each      oxyCODONE immediate-release (Roxicodone) tablet 2.5 mg      Or    oxyCODONE immediate-release (Roxicodone) tablet 5 mg 5 mg at 06/26/24 0801    Or    HYDROmorphone (Dilaudid) injection 0.25 mg      senna-docusate (Pericolace Or Senokot S) 8.6-50 MG per tablet 2 Tablet 2 Tablet at 06/25/24 1646    And    polyethylene glycol/lytes (Miralax) Packet 1 Packet      labetalol (Normodyne/Trandate) injection 10 mg      amLODIPine (Norvasc) tablet 5 mg 5 mg at 06/26/24 0414    atorvastatin (Lipitor) tablet 40 mg 40 mg at 06/25/24 1646    levothyroxine (Synthroid) tablet 100 mcg 100 mcg at 06/26/24 0414    metoprolol SR (Toprol XL) tablet 25 mg 25 mg at 06/26/24 0414       PROBLEM LIST:  Problem Noted   Delirium 6/26/2024   Normocytic Anemia " 6/25/2024   Lumbar Compression Fracture, Closed, Initial Encounter (Hcc) 6/24/2024       ASSESSMENT/PLAN: 84 y.o. male with past medical history of HLD, HTN, Hypothyroidism, was admitted on 6/24/2024 for L2 compression fracture, sent in by neurosurgery with plans for lumbar fusion.     * Lumbar compression fracture, closed, initial encounter (HCC)- (present on admission)  Assessment & Plan  Status post GLF, no LOC, no head strike; patient sent into ED by outside physician to be evaluated for lower lumbar fracture; CT lumbar spine showing burst fracture of L2 with severe canal stenosis;  Dr. Christensen from neurosurgery consulted per ED physician and following; not on blood thinners at home  - Pain management as needed with oxycodone 2.5 and 5 for moderate severe pain with Dilaudid 0.25 for breakthrough  - PT consult  - Follow neurosurgery recommendations:   -Plan for T12-L4 fusion sometime this week.  Neurosurgery continuing to follow.  - Fall precautions  - N.p.o. at midnight for possible OR tomorrow per Neurosurgery, will hold morning dose of heparin  - TLSO brace for out of bed per neurosurgery note    Delirium  Assessment & Plan  Suspect at this time most likely hospital induced delirium. Patient A&Ox1 this morning, has been increasingly confused per his wife at bedside.   -Delirium precautions to include maintaining light dark cycles, minimizing overnight interruptions, frequent reorientation  - Due to patient repeatedly attempting to leave the room, bedside sitter ordered  - Patient on low-dose oxycodone 2.5 and 5 mg for moderate and severe pain as needed with 0.25 mg IV Dilaudid as needed for breakthrough.  Pain has been well-controlled with this regimen so far, will continue to monitor.  - Ordered UA to rule out UTI    Normocytic anemia  Assessment & Plan  Hgb 13.9 on admission, 12.8 on 6/25. Most likely in setting of NPO for procedure. Repeat CBC showed hemoglobin 13.8 this morning.  Continue to monitor.    HLD  (hyperlipidemia)  Assessment & Plan  Continue home atorvastatin    Primary hypertension- (present on admission)  Assessment & Plan  Continue home amlodipine  Continue home metoprolol    Hypothyroidism- (present on admission)  Assessment & Plan  Continue home levothyroxine      Core Measures:  Fluids: PO  Lines: PIV  Abx: None  Diet: Regular (NPO at MN for possible procedure)  PPX: SCDs/TEDs, Heparin (will hold AM dose for procedure)    #DISPOSITION  - Inpatient for plan for T12-L4 fusion per neurosurgery    Michell Hui M.D.  PGY-1  [facility]

## 2024-06-26 NOTE — PROGRESS NOTES
Continues to be confused thinks he is at home with attempts to remove PIV, redirection and up to a chair in the hallway, will monitor.

## 2024-06-26 NOTE — THERAPY
Physical Therapy Contact Note    Patient Name: Victoriano Vasquez  Age:  84 y.o., Sex:  male  Medical Record #: [id number]  Today's Date: 6/26/2024 06/26/24 0808   Interdisciplinary Plan of Care Collaboration   Collaboration Comments 6/26- Pt is pending a T12-L4 fusion this week.  PT to follow up post op..   Session Information   Date / Session Number  6/26- pending surgery  (EVAL)

## 2024-06-26 NOTE — ASSESSMENT & PLAN NOTE
Patient A&Ox1 morning of 6/26, wife noticed shortly after admission that he was more confused.  Likely due to elderly patient in hospital setting. Delirium workup 6/26 essentially negative: No electrolyte abnormalities, A1c 5.5, glucose WNL, lactic acid WNL, ammonia WNL, CPK WNL, UA w/o infection.  Chest x-ray 6/26 showed evidence of old granulomatous disease with some hypoinflation but essentially negative. Patient had received 1 dose of oxycodone in past 24 hours on a.m. of 6/26, did not suspect this to be cause of delirium but held all opiates out of precaution prior to surgery without sig effect. Pt significantly improved am of 6/28.   Plan:   - Delirium precautions, keep patient awake during day time.Reoriented as needed. Pt needing pain medications due to surgery, otherwise minimize sedating medications.

## 2024-06-26 NOTE — CARE PLAN
Problem: Pain - Standard  Goal: Alleviation of pain or a reduction in pain to the patient’s comfort goal  Outcome: Not Met  Note: Prn meds per MAR     Problem: Knowledge Deficit - Standard  Goal: Patient and family/care givers will demonstrate understanding of plan of care, disease process/condition, diagnostic tests and medications  Outcome: Not Met  Note: Needs reorientation, planned surgery, safety     Problem: Fall Risk  Goal: Patient will remain free from falls  Outcome: Not Met  Note: Bed alarm on, telesitter   The patient is Watcher - Medium risk of patient condition declining or worsening    Shift Goals  Clinical Goals: pain control, safety  Patient Goals: safety  Family Goals: pain control    Progress made toward(s) clinical / shift goals:  safety, planned surgery    Patient is not progressing towards the following goals:      Problem: Pain - Standard  Goal: Alleviation of pain or a reduction in pain to the patient’s comfort goal  Outcome: Not Met  Note: Prn meds per MAR     Problem: Knowledge Deficit - Standard  Goal: Patient and family/care givers will demonstrate understanding of plan of care, disease process/condition, diagnostic tests and medications  Outcome: Not Met  Note: Needs reorientation, planned surgery, safety     Problem: Fall Risk  Goal: Patient will remain free from falls  Outcome: Not Met  Note: Bed alarm on, telesitter

## 2024-06-26 NOTE — THERAPY
Occupational Therapy Contact Note    Patient Name: Victoriano Vasquez  Age:  84 y.o., Sex:  male  Medical Record #: [id number]  Today's Date: 6/26/2024 06/26/24 0715   Interdisciplinary Plan of Care Collaboration   IDT Collaboration with  Other (See Comments)   Collaboration Comments 6/26 - OT orders received, pt is pending T12-L4 fusion this week. Will round back as able.

## 2024-06-26 NOTE — ASSESSMENT & PLAN NOTE
Hgb 13.9 on admission. Post procedure stable at 11.0.   Plan:   - Continue to monitor H/H while inpatient

## 2024-06-26 NOTE — PROGRESS NOTES
Received up in chair with no TLSO on, aaox1, Korean speaking, language line used wants to go home and take care of wife, up to chair, seen by MD, no surgery today per MD, bed and chair alarm on for safety, needs telesitter, charge RN made aware, needs attended.

## 2024-06-26 NOTE — PROGRESS NOTES
Neurosurgery Progress Note    Subjective:  Pt awake, alert  Primarily Maltese speaking  Wife at bedside   Pain tolerable   No leg pain     Exam:  Oriented to self, city, time - confused with place   LE str 5/5     BP  Min: 128/62  Max: 131/61  Pulse  Av.5  Min: 83  Max: 95  Resp  Av.8  Min: 16  Max: 18  Temp  Av.4 °C (97.5 °F)  Min: 36.2 °C (97.2 °F)  Max: 36.5 °C (97.7 °F)  SpO2  Av.7 %  Min: 93 %  Max: 94 %    No data recorded    Recent Labs     24  1511 24  0256 24  0847   WBC 10.2 8.4 9.1   RBC 4.59* 4.29* 4.60*   HEMOGLOBIN 13.8* 12.9* 13.8*   HEMATOCRIT 41.3* 38.3* 41.1*   MCV 90.0 89.3 89.3   MCH 30.1 30.1 30.0   MCHC 33.4 33.7 33.6   RDW 57.5* 56.6* 55.3*   PLATELETCT 308 295 339   MPV 9.8 9.7 10.2     Recent Labs     24  1511 24  0256 24  0547   SODIUM 130* 133* 134*   POTASSIUM 4.2 4.1 4.1   CHLORIDE 98 100 98   CO2 19* 21 17*   GLUCOSE 98 86 93   BUN 14 11 14   CREATININE 0.82 0.64 0.77   CALCIUM 9.0 8.8 9.2     Recent Labs     24  1511   APTT 24.2*   INR 1.12           Intake/Output                         24 07 - 24 0659 24 07 - 24 0659      Total  Total                 Intake    P.O.  440  -- 440  --  -- --    P.O. 440 -- 440 -- -- --    Total Intake 440 -- 440 -- -- --       Output    Urine  --  300 300  --  -- --    Number of Times Voided 3 x -- 3 x -- -- --    Urine Void (mL) -- 300 300 -- -- --    Emesis  0  -- 0  --  -- --    Emesis 0 -- 0 -- -- --    Stool  --  -- --  --  -- --    Number of Times Stooled 0 x -- 0 x 0 x -- 0 x    Total Output 0 300 300 -- -- --       Net I/O     440 -300 140 -- -- --              Intake/Output Summary (Last 24 hours) at 2024 1404  Last data filed at 2024 0400  Gross per 24 hour   Intake --   Output 300 ml   Net -300 ml             LR   Continuous    lidocaine  2 Patch Q24HR    heparin  5,000 Units Q8HRS    acetaminophen  650 mg  Q6HRS PRN    Pharmacy Consult Request  1 Each PHARMACY TO DOSE    [Held by provider] oxyCODONE immediate-release  2.5 mg Q3HRS PRN    Or    [Held by provider] oxyCODONE immediate-release  5 mg Q3HRS PRN    Or    [Held by provider] HYDROmorphone  0.25 mg Q3HRS PRN    senna-docusate  2 Tablet Q EVENING    And    polyethylene glycol/lytes  1 Packet QDAY PRN    labetalol  10 mg Q4HRS PRN    amLODIPine  5 mg DAILY    atorvastatin  40 mg Q EVENING    levothyroxine  100 mcg AM ES    metoprolol SR  25 mg BID       Assessment and Plan:  Hospital day #2 L2 burst fx   POD #na  Prophylactic anticoagulation: hep SQ - dc'd   Repeat Lumbar CT completed   OR tomorrow for T12-L4 fusion   Pain control - TLSO when oob   NPO at midnight     ATTENDING ADDENDUM:  Patient seen independently and agree with above note

## 2024-06-26 NOTE — PROGRESS NOTES
[facility] – [place], [place] FAMILY MEDICINE PROGRESS NOTE     Attending: Stevie John M.d.  Senior Resident: Debora Brumfield D.O. (PGY-2)  Hugo Resident: Michell Hui M.D. (PGY-1)  PATIENT: Victoriano Vasquez; [id number]; 1940    Hospital Day # Hospital Day: 2    ID: 84 y.o. male with past medical history of HLD, HTN, Hypothyroidism, was admitted on 6/24/2024 for L2 compression fracture, sent in by neurosurgery with plans for lumbar fusion.     24 Hour Events: No acute events overnight     Subjective: This morning, patient reports that as needed pain medications have been adequate to control his pain.  Denies any new lower extremity weakness, bowel or bladder incontinence.    OBJECTIVE:  Temp:  [36.1 °C (97 °F)-36.5 °C (97.7 °F)] 36.5 °C (97.7 °F)  Pulse:  [70-83] 83  Resp:  [17-18] 18  BP: (122-134)/(58-68) 128/62  SpO2:  [94 %-96 %] 96 %    Intake/Output Summary (Last 24 hours) at 6/25/2024 1802  Last data filed at 6/25/2024 0930  Gross per 24 hour   Intake 440 ml   Output 0 ml   Net 440 ml       PE:  Gen: No acute distress, resting comfortably in bed  HEENT: normocephalic, atraumatic, EOMI  Pulm: clear to auscultation bilaterally, no respiratory distress   Cardio: RRR, no M/R/G  Abdom: soft, nontender, nondistended, normoactive bowel sounds in all quadrants  Ext: No edema, 2+ pulses bilaterally  Spine: Tenderness to palpation over lumbar spine midline, bilateral lower extremity strength and sensation intact  Neuro: Grossly intact    LABS:  Recent Labs     06/24/24  1511 06/25/24  0256   WBC 10.2 8.4   RBC 4.59* 4.29*   HEMOGLOBIN 13.8* 12.9*   HEMATOCRIT 41.3* 38.3*   MCV 90.0 89.3   MCH 30.1 30.1   RDW 57.5* 56.6*   PLATELETCT 308 295   MPV 9.8 9.7   NEUTSPOLYS 79.10*  --    LYMPHOCYTES 13.40*  --    MONOCYTES 6.10  --    EOSINOPHILS 0.40  --    BASOPHILS 0.40  --      Recent Labs     06/24/24  1511 06/25/24  0256   SODIUM 130* 133*   POTASSIUM 4.2 4.1   CHLORIDE 98 100   CO2 19* 21   BUN 14 11   CREATININE 0.82 0.64  "  CALCIUM 9.0 8.8   ALBUMIN 3.1*  --      Estimated GFR/CRCL = Estimated Creatinine Clearance: 79.2 mL/min (by C-G formula based on SCr of 0.64 mg/dL).  Recent Labs     06/24/24  1511 06/25/24  0256   GLUCOSE 98 86     Recent Labs     06/24/24  1511   ASTSGOT 28   ALTSGPT 21   TBILIRUBIN 0.6   ALKPHOSPHAT 178*   GLOBULIN 4.2*   INR 1.12             Recent Labs     06/24/24  1511   INR 1.12   APTT 24.2*       MICROBIOLOGY:   No results found for: \"BLOODCULTU\", \"BLDCULT\", \"BCHOLD\"     IMAGING:   CT-LSPINE W/O PLUS RECONS   Final Result         1. Burst fracture of L2 is again noted with severe canal stenosis.          MEDS:  Current Facility-Administered Medications   Medication Last Admin    heparin injection 5,000 Units 5,000 Units at 06/25/24 1316    acetaminophen (Tylenol) tablet 650 mg      Pharmacy Consult Request ...Pain Management Review 1 Each      oxyCODONE immediate-release (Roxicodone) tablet 2.5 mg      Or    oxyCODONE immediate-release (Roxicodone) tablet 5 mg 5 mg at 06/25/24 0553    Or    HYDROmorphone (Dilaudid) injection 0.25 mg      senna-docusate (Pericolace Or Senokot S) 8.6-50 MG per tablet 2 Tablet 2 Tablet at 06/25/24 1646    And    polyethylene glycol/lytes (Miralax) Packet 1 Packet      labetalol (Normodyne/Trandate) injection 10 mg      amLODIPine (Norvasc) tablet 5 mg 5 mg at 06/25/24 0550    atorvastatin (Lipitor) tablet 40 mg 40 mg at 06/25/24 1646    levothyroxine (Synthroid) tablet 100 mcg 100 mcg at 06/25/24 0550    metoprolol SR (Toprol XL) tablet 25 mg 25 mg at 06/25/24 1646       PROBLEM LIST:  Problem Noted   Normocytic Anemia 6/25/2024   Lumbar Compression Fracture, Closed, Initial Encounter ([facility]) 6/24/2024       ASSESSMENT/PLAN: 84 y.o. male with past medical history of HLD, HTN, Hypothyroidism, was admitted on 6/24/2024 for L2 compression fracture, sent in by neurosurgery with plans for lumbar fusion.     * Lumbar compression fracture, closed, initial encounter (HCC)- (present " on admission)  Assessment & Plan  Status post GLF, no LOC, no head strike; patient sent into ED by outside physician to be evaluated for lower lumbar fracture; CT lumbar spine showing burst fracture of L2 with severe canal stenosis;  Dr. Christensen from neurosurgery consulted per ED physician and following; not on blood thinners at home  - Pain management as needed with oxycodone 2.5 and 5 for moderate severe pain with Dilaudid 0.25 for breakthrough  - PT consult  - Follow neurosurgery recommendations:   -Plan for T12-L4 fusion sometime this week.  Neurosurgery continuing to follow.  - Fall precautions  - N.p.o. at midnight for possible OR tomorrow, will hold morning dose of heparin    Normocytic anemia  Assessment & Plan  Hgb 13.9 on admission, 12.8 on 6/25. Most likely in setting of NPO for procedure. Will repeat CBC in AM.     HLD (hyperlipidemia)  Assessment & Plan  Continue home atorvastatin    Primary hypertension- (present on admission)  Assessment & Plan  Continue home amlodipine  Continue home metoprolol    Hypothyroidism- (present on admission)  Assessment & Plan  Continue home levothyroxine      Core Measures:  Fluids: PO  Lines: PIV  Abx: None  Diet: Regular (NPO at MN for possible procedure)  PPX: SCDs/TEDs, Heparin (will hold AM dose for procedure)    #DISPOSITION  - Inpatient for plan for T12-L4 fusion per neurosurgery    Michell Hui M.D.  PGY-1  [facility]

## 2024-06-26 NOTE — CARE PLAN
Problem: Knowledge Deficit - Standard  Goal: Patient and family/care givers will demonstrate understanding of plan of care, disease process/condition, diagnostic tests and medications  6/26/2024 0142 by Genevieve Miller V, R.N.  Outcome: Progressing  6/26/2024 0142 by Genevieve Miller V, R.N.  Outcome: Progressing  6/26/2024 0141 by Genevieve Miller V, R.N.  Outcome: Progressing   The patient is Stable - Low risk of patient condition declining or worsening    Shift Goals  Clinical Goals: pain control, safety  Patient Goals: safety  Family Goals: pain control    Progress made toward(s) clinical / shift goals:    Problem: Knowledge Deficit - Standard  Goal: Patient and family/care givers will demonstrate understanding of plan of care, disease process/condition, diagnostic tests and medications  6/26/2024 0142 by Genevieve Miller V, R.N.  Outcome: Progressing  6/26/2024 0142 by Genevieve Miller V, R.N.  Outcome: Progressing  6/26/2024 0141 by Genevieve Miller V, R.N.  Outcome: Progressing       Patient is not progressing towards the following goals:

## 2024-06-27 ENCOUNTER — APPOINTMENT (OUTPATIENT)
Dept: RADIOLOGY | Facility: MEDICAL CENTER | Age: 84
DRG: 460 | End: 2024-06-27
Attending: NEUROLOGICAL SURGERY
Payer: MEDICARE

## 2024-06-27 ENCOUNTER — ANESTHESIA (OUTPATIENT)
Dept: SURGERY | Facility: MEDICAL CENTER | Age: 84
End: 2024-06-27
Payer: MEDICARE

## 2024-06-27 ENCOUNTER — ANESTHESIA EVENT (OUTPATIENT)
Dept: SURGERY | Facility: MEDICAL CENTER | Age: 84
End: 2024-06-27
Payer: MEDICARE

## 2024-06-27 LAB
ANION GAP SERPL CALC-SCNC: 15 MMOL/L (ref 7–16)
APPEARANCE UR: CLEAR
BACTERIA #/AREA URNS HPF: NEGATIVE /HPF
BILIRUB UR QL STRIP.AUTO: NEGATIVE
BUN SERPL-MCNC: 10 MG/DL (ref 8–22)
CALCIUM SERPL-MCNC: 8.4 MG/DL (ref 8.5–10.5)
CHLORIDE SERPL-SCNC: 100 MMOL/L (ref 96–112)
CO2 SERPL-SCNC: 20 MMOL/L (ref 20–33)
COLOR UR: YELLOW
CREAT SERPL-MCNC: 0.55 MG/DL (ref 0.5–1.4)
EPI CELLS #/AREA URNS HPF: NEGATIVE /HPF
ERYTHROCYTE [DISTWIDTH] IN BLOOD BY AUTOMATED COUNT: 55.5 FL (ref 35.9–50)
GFR SERPLBLD CREATININE-BSD FMLA CKD-EPI: 96 ML/MIN/1.73 M 2
GLUCOSE SERPL-MCNC: 86 MG/DL (ref 65–99)
GLUCOSE UR STRIP.AUTO-MCNC: NEGATIVE MG/DL
HCT VFR BLD AUTO: 39.2 % (ref 42–52)
HGB BLD-MCNC: 13 G/DL (ref 14–18)
HYALINE CASTS #/AREA URNS LPF: ABNORMAL /LPF
KETONES UR STRIP.AUTO-MCNC: 15 MG/DL
LEUKOCYTE ESTERASE UR QL STRIP.AUTO: NEGATIVE
MCH RBC QN AUTO: 29.5 PG (ref 27–33)
MCHC RBC AUTO-ENTMCNC: 33.2 G/DL (ref 32.3–36.5)
MCV RBC AUTO: 88.9 FL (ref 81.4–97.8)
MICRO URNS: ABNORMAL
NITRITE UR QL STRIP.AUTO: NEGATIVE
PH UR STRIP.AUTO: 5 [PH] (ref 5–8)
PLATELET # BLD AUTO: 306 K/UL (ref 164–446)
PMV BLD AUTO: 10.1 FL (ref 9–12.9)
POTASSIUM SERPL-SCNC: 3.7 MMOL/L (ref 3.6–5.5)
PROT UR QL STRIP: NEGATIVE MG/DL
RBC # BLD AUTO: 4.41 M/UL (ref 4.7–6.1)
RBC # URNS HPF: ABNORMAL /HPF
RBC UR QL AUTO: ABNORMAL
SODIUM SERPL-SCNC: 135 MMOL/L (ref 135–145)
SP GR UR STRIP.AUTO: 1.02
UROBILINOGEN UR STRIP.AUTO-MCNC: 0.2 MG/DL
WBC # BLD AUTO: 9 K/UL (ref 4.8–10.8)
WBC #/AREA URNS HPF: ABNORMAL /HPF

## 2024-06-27 PROCEDURE — 95955 EEG DURING SURGERY: CPT | Performed by: NEUROLOGICAL SURGERY

## 2024-06-27 PROCEDURE — 700111 HCHG RX REV CODE 636 W/ 250 OVERRIDE (IP): Performed by: NURSE PRACTITIONER

## 2024-06-27 PROCEDURE — 160042 HCHG SURGERY MINUTES - EA ADDL 1 MIN LEVEL 5: Performed by: NEUROLOGICAL SURGERY

## 2024-06-27 PROCEDURE — 95938 SOMATOSENSORY TESTING: CPT | Performed by: NEUROLOGICAL SURGERY

## 2024-06-27 PROCEDURE — 0SG1071 FUSION OF 2 OR MORE LUMBAR VERTEBRAL JOINTS WITH AUTOLOGOUS TISSUE SUBSTITUTE, POSTERIOR APPROACH, POSTERIOR COLUMN, OPEN APPROACH: ICD-10-PCS | Performed by: NEUROLOGICAL SURGERY

## 2024-06-27 PROCEDURE — 81001 URINALYSIS AUTO W/SCOPE: CPT

## 2024-06-27 PROCEDURE — 95940 IONM IN OPERATNG ROOM 15 MIN: CPT | Performed by: NEUROLOGICAL SURGERY

## 2024-06-27 PROCEDURE — 700105 HCHG RX REV CODE 258: Performed by: ANESTHESIOLOGY

## 2024-06-27 PROCEDURE — 700111 HCHG RX REV CODE 636 W/ 250 OVERRIDE (IP)

## 2024-06-27 PROCEDURE — 700111 HCHG RX REV CODE 636 W/ 250 OVERRIDE (IP): Mod: JZ | Performed by: ANESTHESIOLOGY

## 2024-06-27 PROCEDURE — 700101 HCHG RX REV CODE 250: Performed by: NEUROLOGICAL SURGERY

## 2024-06-27 PROCEDURE — C1713 ANCHOR/SCREW BN/BN,TIS/BN: HCPCS | Performed by: NEUROLOGICAL SURGERY

## 2024-06-27 PROCEDURE — 160009 HCHG ANES TIME/MIN: Performed by: NEUROLOGICAL SURGERY

## 2024-06-27 PROCEDURE — 95939 C MOTOR EVOKED UPR&LWR LIMBS: CPT | Performed by: NEUROLOGICAL SURGERY

## 2024-06-27 PROCEDURE — 8E0WXBZ COMPUTER ASSISTED PROCEDURE OF TRUNK REGION: ICD-10-PCS | Performed by: NEUROLOGICAL SURGERY

## 2024-06-27 PROCEDURE — 700101 HCHG RX REV CODE 250: Performed by: NURSE PRACTITIONER

## 2024-06-27 PROCEDURE — 00NY0ZZ RELEASE LUMBAR SPINAL CORD, OPEN APPROACH: ICD-10-PCS | Performed by: NEUROLOGICAL SURGERY

## 2024-06-27 PROCEDURE — A9270 NON-COVERED ITEM OR SERVICE: HCPCS | Performed by: ANESTHESIOLOGY

## 2024-06-27 PROCEDURE — 160002 HCHG RECOVERY MINUTES (STAT): Performed by: NEUROLOGICAL SURGERY

## 2024-06-27 PROCEDURE — 770001 HCHG ROOM/CARE - MED/SURG/GYN PRIV*

## 2024-06-27 PROCEDURE — 36415 COLL VENOUS BLD VENIPUNCTURE: CPT

## 2024-06-27 PROCEDURE — 95861 NEEDLE EMG 2 EXTREMITIES: CPT | Performed by: NEUROLOGICAL SURGERY

## 2024-06-27 PROCEDURE — 700111 HCHG RX REV CODE 636 W/ 250 OVERRIDE (IP): Performed by: NEUROLOGICAL SURGERY

## 2024-06-27 PROCEDURE — 700101 HCHG RX REV CODE 250: Performed by: ANESTHESIOLOGY

## 2024-06-27 PROCEDURE — 110371 HCHG SHELL REV 272: Performed by: NEUROLOGICAL SURGERY

## 2024-06-27 PROCEDURE — 99232 SBSQ HOSP IP/OBS MODERATE 35: CPT | Mod: GC | Performed by: FAMILY MEDICINE

## 2024-06-27 PROCEDURE — 700102 HCHG RX REV CODE 250 W/ 637 OVERRIDE(OP): Performed by: ANESTHESIOLOGY

## 2024-06-27 PROCEDURE — 95937 NEUROMUSCULAR JUNCTION TEST: CPT | Performed by: NEUROLOGICAL SURGERY

## 2024-06-27 PROCEDURE — A9270 NON-COVERED ITEM OR SERVICE: HCPCS | Performed by: NURSE PRACTITIONER

## 2024-06-27 PROCEDURE — 72080 X-RAY EXAM THORACOLMB 2/> VW: CPT

## 2024-06-27 PROCEDURE — 700102 HCHG RX REV CODE 250 W/ 637 OVERRIDE(OP): Performed by: NURSE PRACTITIONER

## 2024-06-27 PROCEDURE — 160031 HCHG SURGERY MINUTES - 1ST 30 MINS LEVEL 5: Performed by: NEUROLOGICAL SURGERY

## 2024-06-27 PROCEDURE — 110454 HCHG SHELL REV 250: Performed by: NEUROLOGICAL SURGERY

## 2024-06-27 PROCEDURE — 700102 HCHG RX REV CODE 250 W/ 637 OVERRIDE(OP)

## 2024-06-27 PROCEDURE — 85027 COMPLETE CBC AUTOMATED: CPT

## 2024-06-27 PROCEDURE — 700105 HCHG RX REV CODE 258: Performed by: NURSE PRACTITIONER

## 2024-06-27 PROCEDURE — 0RGA071 FUSION OF THORACOLUMBAR VERTEBRAL JOINT WITH AUTOLOGOUS TISSUE SUBSTITUTE, POSTERIOR APPROACH, POSTERIOR COLUMN, OPEN APPROACH: ICD-10-PCS | Performed by: NEUROLOGICAL SURGERY

## 2024-06-27 PROCEDURE — 80048 BASIC METABOLIC PNL TOTAL CA: CPT

## 2024-06-27 PROCEDURE — 160048 HCHG OR STATISTICAL LEVEL 1-5: Performed by: NEUROLOGICAL SURGERY

## 2024-06-27 PROCEDURE — 160035 HCHG PACU - 1ST 60 MINS PHASE I: Performed by: NEUROLOGICAL SURGERY

## 2024-06-27 PROCEDURE — A9270 NON-COVERED ITEM OR SERVICE: HCPCS

## 2024-06-27 DEVICE — SCREW MAS  SOLERA 6.5 X 55MM (1TCX8+3TCX8=32): Type: IMPLANTABLE DEVICE | Site: SPINE LUMBAR | Status: FUNCTIONAL

## 2024-06-27 DEVICE — GRAFT BONE MAGNIFUSE 1X10CM: Type: IMPLANTABLE DEVICE | Site: SPINE LUMBAR | Status: FUNCTIONAL

## 2024-06-27 DEVICE — SCREW MAS  SOLERA 5.5 X 50MM (1TCX8+3TCX8=32): Type: IMPLANTABLE DEVICE | Site: SPINE LUMBAR | Status: FUNCTIONAL

## 2024-06-27 DEVICE — IMPLANTABLE DEVICE: Type: IMPLANTABLE DEVICE | Site: SPINE LUMBAR | Status: FUNCTIONAL

## 2024-06-27 DEVICE — SCREW MAS  SOLERA 5.5 X 30MM (1TCX8+3TCX6=26): Type: IMPLANTABLE DEVICE | Site: SPINE LUMBAR | Status: FUNCTIONAL

## 2024-06-27 DEVICE — MAGNIFUSE 1X5CM: Type: IMPLANTABLE DEVICE | Site: SPINE LUMBAR | Status: FUNCTIONAL

## 2024-06-27 DEVICE — ROD STRAIGHT COLBALT CHROME ROD 5.5 X 500MM (1TCONX4=4): Type: IMPLANTABLE DEVICE | Site: SPINE LUMBAR | Status: FUNCTIONAL

## 2024-06-27 DEVICE — SCREW SOLERA SET SCREW (1TCX40+3TCX21+2TCX10=123): Type: IMPLANTABLE DEVICE | Site: SPINE LUMBAR | Status: FUNCTIONAL

## 2024-06-27 RX ORDER — CEFAZOLIN SODIUM 1 G/3ML
INJECTION, POWDER, FOR SOLUTION INTRAMUSCULAR; INTRAVENOUS PRN
Status: DISCONTINUED | OUTPATIENT
Start: 2024-06-27 | End: 2024-06-27 | Stop reason: SURG

## 2024-06-27 RX ORDER — AMOXICILLIN 250 MG
1 CAPSULE ORAL NIGHTLY
Status: DISCONTINUED | OUTPATIENT
Start: 2024-06-27 | End: 2024-07-09 | Stop reason: HOSPADM

## 2024-06-27 RX ORDER — DEXMEDETOMIDINE HYDROCHLORIDE 100 UG/ML
INJECTION, SOLUTION INTRAVENOUS PRN
Status: DISCONTINUED | OUTPATIENT
Start: 2024-06-27 | End: 2024-06-27 | Stop reason: SURG

## 2024-06-27 RX ORDER — HYDROMORPHONE HYDROCHLORIDE 1 MG/ML
0.2 INJECTION, SOLUTION INTRAMUSCULAR; INTRAVENOUS; SUBCUTANEOUS
Status: DISCONTINUED | OUTPATIENT
Start: 2024-06-27 | End: 2024-06-27 | Stop reason: HOSPADM

## 2024-06-27 RX ORDER — DEXAMETHASONE SODIUM PHOSPHATE 4 MG/ML
INJECTION, SOLUTION INTRA-ARTICULAR; INTRALESIONAL; INTRAMUSCULAR; INTRAVENOUS; SOFT TISSUE PRN
Status: DISCONTINUED | OUTPATIENT
Start: 2024-06-27 | End: 2024-06-27 | Stop reason: SURG

## 2024-06-27 RX ORDER — HYDRALAZINE HYDROCHLORIDE 20 MG/ML
10 INJECTION INTRAMUSCULAR; INTRAVENOUS
Status: DISCONTINUED | OUTPATIENT
Start: 2024-06-27 | End: 2024-07-09 | Stop reason: HOSPADM

## 2024-06-27 RX ORDER — LABETALOL HYDROCHLORIDE 5 MG/ML
5 INJECTION, SOLUTION INTRAVENOUS
Status: DISCONTINUED | OUTPATIENT
Start: 2024-06-27 | End: 2024-06-27 | Stop reason: HOSPADM

## 2024-06-27 RX ORDER — DIPHENHYDRAMINE HYDROCHLORIDE 50 MG/ML
25 INJECTION INTRAMUSCULAR; INTRAVENOUS EVERY 6 HOURS PRN
Status: DISCONTINUED | OUTPATIENT
Start: 2024-06-27 | End: 2024-07-09 | Stop reason: HOSPADM

## 2024-06-27 RX ORDER — MORPHINE SULFATE 4 MG/ML
2 INJECTION INTRAVENOUS ONCE
Status: COMPLETED | OUTPATIENT
Start: 2024-06-27 | End: 2024-06-27

## 2024-06-27 RX ORDER — BUPIVACAINE HYDROCHLORIDE AND EPINEPHRINE 5; 5 MG/ML; UG/ML
INJECTION, SOLUTION EPIDURAL; INTRACAUDAL; PERINEURAL
Status: DISCONTINUED | OUTPATIENT
Start: 2024-06-27 | End: 2024-06-27 | Stop reason: HOSPADM

## 2024-06-27 RX ORDER — ROCURONIUM BROMIDE 10 MG/ML
INJECTION, SOLUTION INTRAVENOUS PRN
Status: DISCONTINUED | OUTPATIENT
Start: 2024-06-27 | End: 2024-06-27 | Stop reason: SURG

## 2024-06-27 RX ORDER — MAGNESIUM SULFATE HEPTAHYDRATE 40 MG/ML
INJECTION, SOLUTION INTRAVENOUS PRN
Status: DISCONTINUED | OUTPATIENT
Start: 2024-06-27 | End: 2024-06-27 | Stop reason: SURG

## 2024-06-27 RX ORDER — POLYETHYLENE GLYCOL 3350 17 G/17G
1 POWDER, FOR SOLUTION ORAL 2 TIMES DAILY PRN
Status: DISCONTINUED | OUTPATIENT
Start: 2024-06-27 | End: 2024-07-09 | Stop reason: HOSPADM

## 2024-06-27 RX ORDER — TIZANIDINE 4 MG/1
2 TABLET ORAL 3 TIMES DAILY PRN
Status: DISCONTINUED | OUTPATIENT
Start: 2024-06-27 | End: 2024-07-09 | Stop reason: HOSPADM

## 2024-06-27 RX ORDER — EPHEDRINE SULFATE 50 MG/ML
INJECTION, SOLUTION INTRAVENOUS PRN
Status: DISCONTINUED | OUTPATIENT
Start: 2024-06-27 | End: 2024-06-27 | Stop reason: SURG

## 2024-06-27 RX ORDER — DOCUSATE SODIUM 100 MG/1
100 CAPSULE, LIQUID FILLED ORAL 2 TIMES DAILY
Status: DISCONTINUED | OUTPATIENT
Start: 2024-06-27 | End: 2024-06-30

## 2024-06-27 RX ORDER — ONDANSETRON 2 MG/ML
INJECTION INTRAMUSCULAR; INTRAVENOUS PRN
Status: DISCONTINUED | OUTPATIENT
Start: 2024-06-27 | End: 2024-06-27 | Stop reason: SURG

## 2024-06-27 RX ORDER — HYDROMORPHONE HYDROCHLORIDE 2 MG/ML
INJECTION, SOLUTION INTRAMUSCULAR; INTRAVENOUS; SUBCUTANEOUS PRN
Status: DISCONTINUED | OUTPATIENT
Start: 2024-06-27 | End: 2024-06-27 | Stop reason: SURG

## 2024-06-27 RX ORDER — LIDOCAINE HYDROCHLORIDE 20 MG/ML
INJECTION, SOLUTION EPIDURAL; INFILTRATION; INTRACAUDAL; PERINEURAL PRN
Status: DISCONTINUED | OUTPATIENT
Start: 2024-06-27 | End: 2024-06-27 | Stop reason: SURG

## 2024-06-27 RX ORDER — BISACODYL 10 MG
10 SUPPOSITORY, RECTAL RECTAL
Status: DISCONTINUED | OUTPATIENT
Start: 2024-06-27 | End: 2024-07-09 | Stop reason: HOSPADM

## 2024-06-27 RX ORDER — ACETAMINOPHEN 500 MG
1000 TABLET ORAL ONCE
Status: COMPLETED | OUTPATIENT
Start: 2024-06-27 | End: 2024-06-27

## 2024-06-27 RX ORDER — PHENYLEPHRINE HCL IN 0.9% NACL 1 MG/10 ML
SYRINGE (ML) INTRAVENOUS PRN
Status: DISCONTINUED | OUTPATIENT
Start: 2024-06-27 | End: 2024-06-27 | Stop reason: SURG

## 2024-06-27 RX ORDER — HYDROMORPHONE HYDROCHLORIDE 1 MG/ML
0.4 INJECTION, SOLUTION INTRAMUSCULAR; INTRAVENOUS; SUBCUTANEOUS
Status: DISCONTINUED | OUTPATIENT
Start: 2024-06-27 | End: 2024-06-27 | Stop reason: HOSPADM

## 2024-06-27 RX ORDER — SODIUM CHLORIDE AND POTASSIUM CHLORIDE 150; 900 MG/100ML; MG/100ML
INJECTION, SOLUTION INTRAVENOUS CONTINUOUS
Status: DISCONTINUED | OUTPATIENT
Start: 2024-06-27 | End: 2024-06-30

## 2024-06-27 RX ORDER — HALOPERIDOL 5 MG/ML
1 INJECTION INTRAMUSCULAR
Status: DISCONTINUED | OUTPATIENT
Start: 2024-06-27 | End: 2024-06-27 | Stop reason: HOSPADM

## 2024-06-27 RX ORDER — LABETALOL HYDROCHLORIDE 5 MG/ML
10 INJECTION, SOLUTION INTRAVENOUS
Status: DISCONTINUED | OUTPATIENT
Start: 2024-06-27 | End: 2024-07-09 | Stop reason: HOSPADM

## 2024-06-27 RX ORDER — OXYCODONE HCL 5 MG/5 ML
5 SOLUTION, ORAL ORAL
Status: DISCONTINUED | OUTPATIENT
Start: 2024-06-27 | End: 2024-06-27 | Stop reason: HOSPADM

## 2024-06-27 RX ORDER — ONDANSETRON 2 MG/ML
4 INJECTION INTRAMUSCULAR; INTRAVENOUS EVERY 4 HOURS PRN
Status: DISCONTINUED | OUTPATIENT
Start: 2024-06-27 | End: 2024-07-09 | Stop reason: HOSPADM

## 2024-06-27 RX ORDER — DEXMEDETOMIDINE HYDROCHLORIDE 4 UG/ML
INJECTION, SOLUTION INTRAVENOUS
Status: DISCONTINUED | OUTPATIENT
Start: 2024-06-27 | End: 2024-06-27 | Stop reason: SURG

## 2024-06-27 RX ORDER — LIDOCAINE HYDROCHLORIDE 40 MG/ML
SOLUTION TOPICAL PRN
Status: DISCONTINUED | OUTPATIENT
Start: 2024-06-27 | End: 2024-06-27 | Stop reason: SURG

## 2024-06-27 RX ORDER — ENEMA 19; 7 G/133ML; G/133ML
1 ENEMA RECTAL
Status: DISCONTINUED | OUTPATIENT
Start: 2024-06-27 | End: 2024-07-09 | Stop reason: HOSPADM

## 2024-06-27 RX ORDER — ONDANSETRON 4 MG/1
4 TABLET, ORALLY DISINTEGRATING ORAL EVERY 4 HOURS PRN
Status: DISCONTINUED | OUTPATIENT
Start: 2024-06-27 | End: 2024-07-09 | Stop reason: HOSPADM

## 2024-06-27 RX ORDER — SODIUM CHLORIDE, SODIUM LACTATE, POTASSIUM CHLORIDE, CALCIUM CHLORIDE 600; 310; 30; 20 MG/100ML; MG/100ML; MG/100ML; MG/100ML
INJECTION, SOLUTION INTRAVENOUS CONTINUOUS
Status: DISCONTINUED | OUTPATIENT
Start: 2024-06-27 | End: 2024-06-27 | Stop reason: HOSPADM

## 2024-06-27 RX ORDER — HYDROMORPHONE HYDROCHLORIDE 1 MG/ML
0.1 INJECTION, SOLUTION INTRAMUSCULAR; INTRAVENOUS; SUBCUTANEOUS
Status: DISCONTINUED | OUTPATIENT
Start: 2024-06-27 | End: 2024-06-27 | Stop reason: HOSPADM

## 2024-06-27 RX ORDER — CEFAZOLIN SODIUM 1 G/3ML
INJECTION, POWDER, FOR SOLUTION INTRAMUSCULAR; INTRAVENOUS
Status: DISCONTINUED | OUTPATIENT
Start: 2024-06-27 | End: 2024-06-27 | Stop reason: HOSPADM

## 2024-06-27 RX ORDER — AMOXICILLIN 250 MG
1 CAPSULE ORAL
Status: DISCONTINUED | OUTPATIENT
Start: 2024-06-27 | End: 2024-07-09 | Stop reason: HOSPADM

## 2024-06-27 RX ORDER — DIPHENHYDRAMINE HCL 25 MG
25 TABLET ORAL EVERY 6 HOURS PRN
Status: DISCONTINUED | OUTPATIENT
Start: 2024-06-27 | End: 2024-07-09 | Stop reason: HOSPADM

## 2024-06-27 RX ORDER — MORPHINE SULFATE 4 MG/ML
2 INJECTION INTRAVENOUS
Status: DISCONTINUED | OUTPATIENT
Start: 2024-06-27 | End: 2024-06-28

## 2024-06-27 RX ORDER — HYDRALAZINE HYDROCHLORIDE 20 MG/ML
5 INJECTION INTRAMUSCULAR; INTRAVENOUS
Status: DISCONTINUED | OUTPATIENT
Start: 2024-06-27 | End: 2024-06-27 | Stop reason: HOSPADM

## 2024-06-27 RX ORDER — ONDANSETRON 2 MG/ML
4 INJECTION INTRAMUSCULAR; INTRAVENOUS
Status: DISCONTINUED | OUTPATIENT
Start: 2024-06-27 | End: 2024-06-27 | Stop reason: HOSPADM

## 2024-06-27 RX ORDER — SODIUM CHLORIDE, SODIUM LACTATE, POTASSIUM CHLORIDE, CALCIUM CHLORIDE 600; 310; 30; 20 MG/100ML; MG/100ML; MG/100ML; MG/100ML
INJECTION, SOLUTION INTRAVENOUS
Status: DISCONTINUED | OUTPATIENT
Start: 2024-06-27 | End: 2024-06-27 | Stop reason: SURG

## 2024-06-27 RX ADMIN — EPHEDRINE SULFATE 5 MG: 50 INJECTION, SOLUTION INTRAVENOUS at 09:12

## 2024-06-27 RX ADMIN — Medication 1 APPLICATOR: at 20:41

## 2024-06-27 RX ADMIN — SENNOSIDES AND DOCUSATE SODIUM 1 TABLET: 50; 8.6 TABLET ORAL at 20:23

## 2024-06-27 RX ADMIN — PROPOFOL 20 MG: 10 INJECTION, EMULSION INTRAVENOUS at 10:46

## 2024-06-27 RX ADMIN — FENTANYL CITRATE 50 MCG: 50 INJECTION, SOLUTION INTRAMUSCULAR; INTRAVENOUS at 10:08

## 2024-06-27 RX ADMIN — EPHEDRINE SULFATE 5 MG: 50 INJECTION, SOLUTION INTRAVENOUS at 08:32

## 2024-06-27 RX ADMIN — MORPHINE SULFATE 2 MG: 4 INJECTION INTRAVENOUS at 14:44

## 2024-06-27 RX ADMIN — ACETAMINOPHEN 1000 MG: 500 TABLET ORAL at 05:31

## 2024-06-27 RX ADMIN — PROPOFOL 60 MG: 10 INJECTION, EMULSION INTRAVENOUS at 07:59

## 2024-06-27 RX ADMIN — PHENYLEPHRINE HYDROCHLORIDE 1 MCG/KG/MIN: 10 INJECTION INTRAVENOUS at 08:00

## 2024-06-27 RX ADMIN — DEXAMETHASONE SODIUM PHOSPHATE 8 MG: 4 INJECTION INTRA-ARTICULAR; INTRALESIONAL; INTRAMUSCULAR; INTRAVENOUS; SOFT TISSUE at 08:40

## 2024-06-27 RX ADMIN — SODIUM CHLORIDE, POTASSIUM CHLORIDE, SODIUM LACTATE AND CALCIUM CHLORIDE: 600; 310; 30; 20 INJECTION, SOLUTION INTRAVENOUS at 07:46

## 2024-06-27 RX ADMIN — MAGNESIUM SULFATE HEPTAHYDRATE 4 G: 2 INJECTION, SOLUTION INTRAVENOUS at 08:45

## 2024-06-27 RX ADMIN — LIDOCAINE HYDROCHLORIDE 80 MG: 20 INJECTION, SOLUTION EPIDURAL; INFILTRATION; INTRACAUDAL at 07:59

## 2024-06-27 RX ADMIN — HYDROMORPHONE HYDROCHLORIDE 1 MG: 2 INJECTION INTRAMUSCULAR; INTRAVENOUS; SUBCUTANEOUS at 09:05

## 2024-06-27 RX ADMIN — POTASSIUM CHLORIDE AND SODIUM CHLORIDE: 900; 150 INJECTION, SOLUTION INTRAVENOUS at 14:38

## 2024-06-27 RX ADMIN — ATORVASTATIN CALCIUM 40 MG: 40 TABLET, FILM COATED ORAL at 20:23

## 2024-06-27 RX ADMIN — Medication: at 14:54

## 2024-06-27 RX ADMIN — FENTANYL CITRATE 100 MCG: 50 INJECTION, SOLUTION INTRAMUSCULAR; INTRAVENOUS at 07:59

## 2024-06-27 RX ADMIN — AMLODIPINE BESYLATE 5 MG: 5 TABLET ORAL at 05:31

## 2024-06-27 RX ADMIN — PROPOFOL 20 MG: 10 INJECTION, EMULSION INTRAVENOUS at 09:59

## 2024-06-27 RX ADMIN — CEFAZOLIN 2 G: 1 INJECTION, POWDER, FOR SOLUTION INTRAMUSCULAR; INTRAVENOUS at 07:59

## 2024-06-27 RX ADMIN — HYDROMORPHONE HYDROCHLORIDE 1 MG: 2 INJECTION INTRAMUSCULAR; INTRAVENOUS; SUBCUTANEOUS at 08:40

## 2024-06-27 RX ADMIN — DEXMEDETOMIDINE HYDROCHLORIDE 0.6 MCG/KG/HR: 100 INJECTION, SOLUTION INTRAVENOUS at 08:00

## 2024-06-27 RX ADMIN — ROCURONIUM BROMIDE 30 MG: 50 INJECTION, SOLUTION INTRAVENOUS at 07:59

## 2024-06-27 RX ADMIN — SODIUM CHLORIDE, POTASSIUM CHLORIDE, SODIUM LACTATE AND CALCIUM CHLORIDE: 600; 310; 30; 20 INJECTION, SOLUTION INTRAVENOUS at 10:01

## 2024-06-27 RX ADMIN — DEXMEDETOMIDINE HYDROCHLORIDE 3 MCG: 100 INJECTION, SOLUTION INTRAVENOUS at 07:59

## 2024-06-27 RX ADMIN — Medication 100 MCG: at 07:59

## 2024-06-27 RX ADMIN — ONDANSETRON 4 MG: 2 INJECTION INTRAMUSCULAR; INTRAVENOUS at 10:24

## 2024-06-27 RX ADMIN — CEFAZOLIN 2 G: 2 INJECTION, POWDER, FOR SOLUTION INTRAMUSCULAR; INTRAVENOUS at 16:49

## 2024-06-27 RX ADMIN — METOPROLOL SUCCINATE 25 MG: 25 TABLET, EXTENDED RELEASE ORAL at 05:31

## 2024-06-27 RX ADMIN — EPHEDRINE SULFATE 5 MG: 50 INJECTION, SOLUTION INTRAVENOUS at 08:51

## 2024-06-27 RX ADMIN — DOCUSATE SODIUM 100 MG: 100 CAPSULE, LIQUID FILLED ORAL at 20:23

## 2024-06-27 RX ADMIN — LIDOCAINE HYDROCHLORIDE 4 ML: 40 SOLUTION TOPICAL at 08:03

## 2024-06-27 RX ADMIN — FENTANYL CITRATE 50 MCG: 50 INJECTION, SOLUTION INTRAMUSCULAR; INTRAVENOUS at 10:01

## 2024-06-27 RX ADMIN — LEVOTHYROXINE SODIUM 100 MCG: 0.1 TABLET ORAL at 05:31

## 2024-06-27 ASSESSMENT — PAIN SCALES - GENERAL: PAIN_LEVEL: 0

## 2024-06-27 ASSESSMENT — PAIN DESCRIPTION - PAIN TYPE
TYPE: ACUTE PAIN
TYPE: ACUTE PAIN

## 2024-06-27 NOTE — PROGRESS NOTES
1059: Pt arrived from OR post back surgery under anesthesia. Pt is asleep. Site dressing is CDI with hemovac to compression suction. Cardiac rhythm appears to be SR.    1115: Pt arousable to voice. Follows commands. Alert to self. Able to move everything.     1153: Report to KENN Huerta.     1157: Pt back to room via bed with RN.

## 2024-06-27 NOTE — PROGRESS NOTES
Able to cut IVF line with his nails, IVF line replaced, wanted to get up and go to the bank, reoriented accordingly, kept safe.

## 2024-06-27 NOTE — PROGRESS NOTES
[facility] MEDICINE PROGRESS NOTE        Attending:   Stevie John MD    Resident:   Debora Brumfield D.O.    PATIENT:   Victoriano Vasquez; [id number]; 1940    ID:   84 y.o. male with past medical history of HLD, HTN, Hypothyroidism, was admitted on 6/24/2024 for L2 compression fracture, sent in by neurosurgery now s/p T12-L4 fusion.      INTERVAL:   Patient began experiencing worsening needing delirium yesterday, did not know why he was in the hospital, believed it was for a procedure for his wife.  Was conversational but answers were not displaying orientation.  He was oriented to self, city and time but confused with location and reason.  Prophylactic heparin discontinued by neurosurgery, pt now s/p T12-L4 fusion today.    SUBJECTIVE:   Patient very somnolent on return from surgery, still not oriented and displaying signs of delirium.    OBJECTIVE:  Vitals:    06/26/24 0408 06/26/24 0757 06/26/24 1428 06/27/24 0619   BP: 131/70 131/61 123/53 125/60   Pulse: 95 93 87 71   Resp: 16 16 16 16   Temp: 36.2 °C (97.2 °F) 36.2 °C (97.2 °F) 36.2 °C (97.2 °F) 36.9 °C (98.4 °F)   TempSrc: Temporal Temporal Temporal Temporal   SpO2: 94% 93% 94% 95%   Weight:       Height:         No intake or output data in the 24 hours ending 06/27/24 0637    PHYSICAL EXAM:  Gen: Asleep in bed, reported to still be disoriented and delirious.  HEENT: normocephalic, atraumatic, EOMI  Pulm: Normal effort, good chest rise  Abdom: Not noticeably distended.  Ext: No edema, 2+ pulses bilaterally  : External genitalia normal appearing with Covarrubias catheter with normal-appearing yellow urine.  Spine: Drain containing blood coming from back.  Neuro: A&Ox1, no focal neurologic deficits       LABS:  Recent Labs     06/24/24  1511 06/25/24  0256 06/26/24  0847   WBC 10.2 8.4 9.1   RBC 4.59* 4.29* 4.60*   HEMOGLOBIN 13.8* 12.9* 13.8*   HEMATOCRIT 41.3* 38.3* 41.1*   MCV 90.0 89.3 89.3   MCH 30.1 30.1 30.0   RDW 57.5* 56.6* 55.3*   PLATELETCT  308 295 339   MPV 9.8 9.7 10.2   NEUTSPOLYS 79.10*  --   --    LYMPHOCYTES 13.40*  --   --    MONOCYTES 6.10  --   --    EOSINOPHILS 0.40  --   --    BASOPHILS 0.40  --   --      Recent Labs     06/24/24  1511 06/25/24  0256 06/26/24  0547 06/26/24  1215   SODIUM 130* 133* 134*  --    POTASSIUM 4.2 4.1 4.1  --    CHLORIDE 98 100 98  --    CO2 19* 21 17*  --    BUN 14 11 14  --    CREATININE 0.82 0.64 0.77  --    CALCIUM 9.0 8.8 9.2  --    MAGNESIUM  --   --   --  1.9   PHOSPHORUS  --   --   --  3.5   ALBUMIN 3.1*  --   --   --      Estimated GFR/CRCL = Estimated Creatinine Clearance: 65.8 mL/min (by C-G formula based on SCr of 0.77 mg/dL).  Recent Labs     06/24/24  1511 06/25/24  0256 06/26/24  0547   GLUCOSE 98 86 93     Recent Labs     06/24/24  1511 06/26/24  1215   ASTSGOT 28  --    ALTSGPT 21  --    TBILIRUBIN 0.6  --    ALKPHOSPHAT 178*  --    GLOBULIN 4.2*  --    INR 1.12  --    AMMONIA  --  12     Recent Labs     06/26/24  1215   CPKTOTAL 37         Recent Labs     06/24/24  1511   INR 1.12   APTT 24.2*         IMAGING:  DX-CHEST-PORTABLE (1 VIEW)   Final Result      1.  Hypoinflation without other evidence for acute cardiopulmonary disease.   2.  Evidence of old granulomatous disease.      CT-LSPINE W/O PLUS RECONS   Final Result         1. Burst fracture of L2 is again noted with severe canal stenosis.          MEDS:  Current Facility-Administered Medications   Medication Last Admin    lactated ringers infusion New Bag at 06/26/24 1144    lidocaine (Asperflex) 4 % patch 2 Patch 2 Patch at 06/26/24 1145    acetaminophen (Tylenol) tablet 650 mg      Pharmacy Consult Request ...Pain Management Review 1 Each      [Held by provider] oxyCODONE immediate-release (Roxicodone) tablet 2.5 mg      Or    [Held by provider] oxyCODONE immediate-release (Roxicodone) tablet 5 mg 5 mg at 06/26/24 0801    Or    [Held by provider] HYDROmorphone (Dilaudid) injection 0.25 mg      senna-docusate (Pericolace Or Senokot S)  8.6-50 MG per tablet 2 Tablet 2 Tablet at 06/26/24 1624    And    polyethylene glycol/lytes (Miralax) Packet 1 Packet      labetalol (Normodyne/Trandate) injection 10 mg      amLODIPine (Norvasc) tablet 5 mg 5 mg at 06/27/24 0531    atorvastatin (Lipitor) tablet 40 mg 40 mg at 06/26/24 1624    levothyroxine (Synthroid) tablet 100 mcg 100 mcg at 06/27/24 0531    metoprolol SR (Toprol XL) tablet 25 mg 25 mg at 06/27/24 0531       ASSESSMENT/PLAN:  84 y.o. male with past medical history of HLD, HTN, Hypothyroidism, was admitted on 6/24/2024 for L2 compression fracture, sent in by neurosurgery now s/p T12-L4 fusion.      * Lumbar compression fracture, closed, initial encounter ([facility])- (present on admission)  S/p T12-L4 fusion on 6/27 by neurosurgery.  - PCA pump placed-skip loading dose due to patient somnolence, put on low-dose.  - PT consult  - Follow neurosurgery recommendations and discharge plan  - Fall precautions  - Regular diet, advance as tolerated  - TLSO brace for out of bed per neurosurgery note     Delirium  Assessment & Plan  Suspect at this time most likely hospital induced delirium. Patient A&Ox1 morning of 6/26, wife noticed shortly after admission that he was more confused.    Delirium workup 6/26 essentially negative: No electrolyte abnormalities, A1c 5.5, glucose WNL, lactic acid WNL, ammonia WNL, CPK WNL.  Chest x-ray 6/26 showed evidence of old granulomatous disease with some hypoinflation but essentially negative.  Slight metabolic acidosis that was new on 6/26 but repeat BMP on 6/27 showed resolution.  Patient was started on fluids 6/26.  Hypertension controlled.  -Delirium precautions to include maintaining light dark cycles, minimizing overnight interruptions, frequent reorientation  - Due to patient repeatedly attempting to leave the room, bedside sitter ordered 6/26  - Patient had received 1 dose of oxycodone in past 24 hours on a.m. of 6/26, did not suspect this to be cause of delirium but  held all opiates out of precaution.  - Ordered STAT UA 6/26 to rule out UTI-still pending     Normocytic anemia  Assessment & Plan  Hgb 13.9 on admission, 12.8 on 6/25. Most likely in setting of NPO for procedure. Repeat CBC showed hemoglobin 13.8.  Continue to monitor.  No active signs of bleeding.     HLD (hyperlipidemia)  Assessment & Plan  Continue home atorvastatin     Primary hypertension- (present on admission)  Assessment & Plan  Continue home amlodipine  Continue home metoprolol     Hypothyroidism- (present on admission)  Assessment & Plan  Continue home levothyroxine        Core Measures:  Fluids: PO  Lines: PIV  Abx: None  Diet: Regular   PPX: SCDs/TEDs, Heparin currently held-restart PPx if patient will have prolonged stay    #DISPOSITION  - Inpatient for plan for T12-L4 fusion per neurosurgery      CODE STATUS: Full code      Debora Brumfield D.O.  PGY-2  [facility]

## 2024-06-27 NOTE — ANESTHESIA PROCEDURE NOTES
Arterial Line    Performed by: Susi Ayala M.D.  Authorized by: Susi Ayala M.D.    Start Time:  6/27/2024 7:55 AM  End Time:  6/27/2024 7:56 AM  Localization: surface landmarks    Patient Location:  OR  Indication: continuous blood pressure monitoring        Catheter Size:  20 G  Seldinger Technique?: Yes    Laterality:  Right  Site:  Radial artery  Line Secured:  Antimicrobial disc, tape and transparent dressing  Events: patient tolerated procedure well with no complications

## 2024-06-27 NOTE — CARE PLAN
Problem: Pain - Standard  Goal: Alleviation of pain or a reduction in pain to the patient’s comfort goal  Outcome: Progressing     Problem: Knowledge Deficit - Standard  Goal: Patient and family/care givers will demonstrate understanding of plan of care, disease process/condition, diagnostic tests and medications  Outcome: Progressing     Problem: Fall Risk  Goal: Patient will remain free from falls  Outcome: Progressing    The patient is Stable - Low risk of patient condition declining or worsening    Shift Goals  Clinical Goals: NPO for surgery tomorrow- 6/27/2024, safety  Patient Goals: Maintained NPO status for surgery, comfort  Family Goals: Not present    Progress made toward(s) clinical / shift goals: Reoriented patient to the surroundings, Maintained NPO status for surgery. Advised and instructed not to eat/ drink anything post 12 mn. Placed NPO tag at the outside door. Placed bed in a lowest possible position, placed bed side table and call bell within reach, side rails up x2, alarm on. No new skin tear/ breakdown of the skin found. Kept safe and comfortably.

## 2024-06-27 NOTE — OR SURGEON
Immediate Post OP Note    PreOp Diagnosis: L2 burst fracture       PostOp Diagnosis: same       Procedure(s):  FUSION, SPINE, THORACIC, POSTERIOR APPROACH, WITH O-ARM IMAGING GUIDANCE - T12-L4 WITH INSTRUMENTATION - Wound Class: Clean with Drain  LAMINECTOMY, SPINE, THORACIC, L1-L2 - Wound Class: Clean with Drain    Surgeon(s):  Ebenezer Christensen M.D.    Anesthesiologist/Type of Anesthesia:  Anesthesiologist: Susi Ayala M.D./General    Surgical Staff:  Assistant: LEON Kamara  Circulator: Uri Tom R.N.; Marsha Nichole R.N.  Scrub Person: Hien Glover  Radiology Technologist: Mary Hernandez    Specimens removed if any:  * No specimens in log *    Estimated Blood Loss: 150 cc     Findings: good decompression, good hardware placement     Complications: none         6/27/2024 10:52 AM LEON Kamara

## 2024-06-27 NOTE — ANESTHESIA POSTPROCEDURE EVALUATION
Patient: Victoriano Vasquez    Procedure Summary       Date: 06/27/24 Room / Location: [facility] 05 / SURGERY [facility]    Anesthesia Start: 0746 Anesthesia Stop: 1101    Procedures:       FUSION, SPINE, THORACIC, POSTERIOR APPROACH, WITH O-ARM IMAGING GUIDANCE - T12-L4 WITH INSTRUMENTATION (Spine Lumbar)      LAMINECTOMY, SPINE, THORACIC, L1-L2 (Spine Lumbar) Diagnosis: (L2 BURST FRACTURE)    Surgeons: Ebenezer Christensen M.D. Responsible Provider: Susi Ayala M.D.    Anesthesia Type: general ASA Status: 3            Final Anesthesia Type: general  Last vitals  BP   Blood Pressure : 106/51    Temp   36.5 °C (97.7 °F)    Pulse   61   Resp   17    SpO2   97 %      Anesthesia Post Evaluation    Patient location during evaluation: PACU  Patient participation: complete - patient participated  Level of consciousness: sleepy but conscious  Pain score: 0    Airway patency: patent  Anesthetic complications: no  Cardiovascular status: hemodynamically stable  Respiratory status: acceptable  Hydration status: acceptable    PONV: none          No notable events documented.     Nurse Pain Score: 0 (NPRS)

## 2024-06-27 NOTE — ANESTHESIA PROCEDURE NOTES
Airway    Date/Time: 6/27/2024 8:03 AM    Performed by: Susi Ayala M.D.  Authorized by: Susi Ayala M.D.    Location:  OR  Urgency:  Elective  Indications for Airway Management:  Anesthesia      Spontaneous Ventilation: absent    Sedation Level:  Deep  Preoxygenated: Yes    Patient Position:  Sniffing  Mask Difficulty Assessment:  2 - vent by mask + OA or adjuvant +/- NMBA  Final Airway Type:  Endotracheal airway  Final Endotracheal Airway:  ETT  Cuffed: Yes    Technique Used for Successful ETT Placement:  Direct laryngoscopy  Devices/Methods Used in Placement:  Intubating stylet    Insertion Site:  Oral  Blade Type:  Carmen  Laryngoscope Blade/Videolaryngoscope Blade Size:  3  ETT Size (mm):  7.5  Measured from:  Teeth  ETT to Teeth (cm):  21  Placement Verified by: capnometry and palpation of cuff    Cormack-Lehane Classification:  Grade I - full view of glottis  Number of Attempts at Approach:  1

## 2024-06-27 NOTE — PROGRESS NOTES
Patient transferred to room T303 in stable condition with telesitter. Belongings at bedside. Report given to Tomas

## 2024-06-27 NOTE — ANESTHESIA TIME REPORT
Anesthesia Start and Stop Event Times       Date Time Event    6/27/2024 0730 Ready for Procedure     0746 Anesthesia Start     1101 Anesthesia Stop          Responsible Staff  06/27/24      Name Role Begin End    Susi Ayala M.D. Anesth 0746 1101          Overtime Reason:  no overtime (within assigned shift)    Comments:

## 2024-06-27 NOTE — ANESTHESIA PROCEDURE NOTES
Peripheral IV    Date/Time: 6/27/2024 8:06 AM    Performed by: Susi Ayala M.D.  Authorized by: Susi Ayala M.D.    Size:  16 G  Laterality:  Right  Peripheral IV Location:  Forearm  Local Anesthetic:  None  Site Prep:  Alcohol  Technique:  Direct puncture  Attempts:  1

## 2024-06-27 NOTE — CARE PLAN
The patient is Stable - Low risk of patient condition declining or worsening    Shift Goals  Clinical Goals: safety, pain control, reorientation  Patient Goals: rest,  Family Goals: POC    Progress made toward(s) clinical / shift goals:    Problem: Knowledge Deficit - Standard  Goal: Patient and family/care givers will demonstrate understanding of plan of care, disease process/condition, diagnostic tests and medications  Outcome: Progressing       Patient is not progressing towards the following goals:

## 2024-06-27 NOTE — ANESTHESIA PREPROCEDURE EVALUATION
Case: [id number] Anesthesia Start Date/Time: 06/27/24 0746    Procedures:       FUSION, SPINE, THORACIC, POSTERIOR APPROACH, WITH O-ARM IMAGING GUIDANCE - T12-L4 (Spine Lumbar) - with neuromonitoring      LAMINECTOMY, SPINE, THORACIC (Spine Lumbar) - with neuromonitoring    Anesthesia type: General    Location: [place] [id number] / SURGERY [facility]    Surgeons: Ebenezer Christensen M.D.          83 yo w/L2 compression fracture due to previous fall who is unable to ambulate due to pain.  Patient recently diagnosed w/moderate to severe AS by echo.  Denies CP/SOB/syncope/lightheadedness.  States prior fall and compression fracture was able to ambulate/complete ADLs without issues.  Patient has experienced some delirium since admission to hospital.  History and physical obtained with use of ipad .    Relevant Problems   ANESTHESIA (within normal limits)      CARDIAC   (positive) AF (atrial fibrillation) ([facility])   (positive) Nonrheumatic aortic valve stenosis   (positive) Primary hypertension      ENDO   (positive) Hypothyroidism      Other   (positive) NSVT (nonsustained ventricular tachycardia) (HCC)     Denies CAD, CP/SOB,CVA, DM, LUNG DZ, GERD, URI    Physical Exam    Airway   Mallampati: II  TM distance: >3 FB  Neck ROM: limited       Cardiovascular   Rhythm: regular  Rate: normal  (+) murmur     Dental   (+) upper dentures, lower dentures        Facial Hair   Pulmonary   Breath sounds clear to auscultation     Abdominal    Neurological     Comments: A&O x2               Anesthesia Plan    ASA 3 (moderate to severe AS)   ASA physical status 3 criteria: other (comment)    Plan - general       Airway plan will be ETT    (Preinduction a-line)      Induction: intravenous    Postoperative Plan: Postoperative administration of opioids is intended.    Pertinent diagnostic labs and testing reviewed    Informed Consent:    Anesthetic plan and risks discussed with patient and spouse (discussed increased risk due to  AS).    Use of blood products discussed with: patient and spouse whom consented to blood products.

## 2024-06-27 NOTE — PROGRESS NOTES
Pt arrived to unit by bed appx 1200. VSS. Sleeping peacefully. Wife updated on POC. SCD and bed alarm on. Telesitter in place.

## 2024-06-28 LAB
ALBUMIN SERPL BCP-MCNC: 2.6 G/DL (ref 3.2–4.9)
ALBUMIN/GLOB SERPL: 0.8 G/DL
ALP SERPL-CCNC: 108 U/L (ref 30–99)
ALT SERPL-CCNC: 7 U/L (ref 2–50)
ANION GAP SERPL CALC-SCNC: 10 MMOL/L (ref 7–16)
AST SERPL-CCNC: 18 U/L (ref 12–45)
BILIRUB SERPL-MCNC: 0.3 MG/DL (ref 0.1–1.5)
BUN SERPL-MCNC: 14 MG/DL (ref 8–22)
CALCIUM ALBUM COR SERPL-MCNC: 8.5 MG/DL (ref 8.5–10.5)
CALCIUM SERPL-MCNC: 7.4 MG/DL (ref 8.5–10.5)
CHLORIDE SERPL-SCNC: 105 MMOL/L (ref 96–112)
CO2 SERPL-SCNC: 21 MMOL/L (ref 20–33)
CREAT SERPL-MCNC: 0.61 MG/DL (ref 0.5–1.4)
ERYTHROCYTE [DISTWIDTH] IN BLOOD BY AUTOMATED COUNT: 57.6 FL (ref 35.9–50)
GFR SERPLBLD CREATININE-BSD FMLA CKD-EPI: 93 ML/MIN/1.73 M 2
GLOBULIN SER CALC-MCNC: 3.3 G/DL (ref 1.9–3.5)
GLUCOSE SERPL-MCNC: 113 MG/DL (ref 65–99)
HCT VFR BLD AUTO: 33.3 % (ref 42–52)
HGB BLD-MCNC: 11.2 G/DL (ref 14–18)
MCH RBC QN AUTO: 30.9 PG (ref 27–33)
MCHC RBC AUTO-ENTMCNC: 33.6 G/DL (ref 32.3–36.5)
MCV RBC AUTO: 91.7 FL (ref 81.4–97.8)
PLATELET # BLD AUTO: 249 K/UL (ref 164–446)
PMV BLD AUTO: 10.1 FL (ref 9–12.9)
POTASSIUM SERPL-SCNC: 4.9 MMOL/L (ref 3.6–5.5)
PROT SERPL-MCNC: 5.9 G/DL (ref 6–8.2)
RBC # BLD AUTO: 3.63 M/UL (ref 4.7–6.1)
SODIUM SERPL-SCNC: 136 MMOL/L (ref 135–145)
WBC # BLD AUTO: 11.2 K/UL (ref 4.8–10.8)

## 2024-06-28 PROCEDURE — 97166 OT EVAL MOD COMPLEX 45 MIN: CPT

## 2024-06-28 PROCEDURE — 700101 HCHG RX REV CODE 250: Performed by: NURSE PRACTITIONER

## 2024-06-28 PROCEDURE — A9270 NON-COVERED ITEM OR SERVICE: HCPCS

## 2024-06-28 PROCEDURE — 51798 US URINE CAPACITY MEASURE: CPT

## 2024-06-28 PROCEDURE — 700102 HCHG RX REV CODE 250 W/ 637 OVERRIDE(OP)

## 2024-06-28 PROCEDURE — 97162 PT EVAL MOD COMPLEX 30 MIN: CPT

## 2024-06-28 PROCEDURE — 700111 HCHG RX REV CODE 636 W/ 250 OVERRIDE (IP): Performed by: NURSE PRACTITIONER

## 2024-06-28 PROCEDURE — 700105 HCHG RX REV CODE 258: Performed by: NURSE PRACTITIONER

## 2024-06-28 PROCEDURE — 97535 SELF CARE MNGMENT TRAINING: CPT

## 2024-06-28 PROCEDURE — 36415 COLL VENOUS BLD VENIPUNCTURE: CPT

## 2024-06-28 PROCEDURE — 770001 HCHG ROOM/CARE - MED/SURG/GYN PRIV*

## 2024-06-28 PROCEDURE — 700102 HCHG RX REV CODE 250 W/ 637 OVERRIDE(OP): Performed by: NURSE PRACTITIONER

## 2024-06-28 PROCEDURE — 80053 COMPREHEN METABOLIC PANEL: CPT

## 2024-06-28 PROCEDURE — 700101 HCHG RX REV CODE 250

## 2024-06-28 PROCEDURE — A9270 NON-COVERED ITEM OR SERVICE: HCPCS | Performed by: NURSE PRACTITIONER

## 2024-06-28 PROCEDURE — 99232 SBSQ HOSP IP/OBS MODERATE 35: CPT | Mod: GC | Performed by: FAMILY MEDICINE

## 2024-06-28 PROCEDURE — 85027 COMPLETE CBC AUTOMATED: CPT

## 2024-06-28 RX ORDER — MORPHINE SULFATE 4 MG/ML
2 INJECTION INTRAVENOUS
Status: DISCONTINUED | OUTPATIENT
Start: 2024-06-28 | End: 2024-07-09 | Stop reason: HOSPADM

## 2024-06-28 RX ORDER — OXYCODONE HYDROCHLORIDE 5 MG/1
5 TABLET ORAL EVERY 4 HOURS PRN
Status: DISCONTINUED | OUTPATIENT
Start: 2024-06-28 | End: 2024-07-09 | Stop reason: HOSPADM

## 2024-06-28 RX ORDER — OXYCODONE HYDROCHLORIDE 10 MG/1
10 TABLET ORAL EVERY 4 HOURS PRN
Status: DISCONTINUED | OUTPATIENT
Start: 2024-06-28 | End: 2024-07-09 | Stop reason: HOSPADM

## 2024-06-28 RX ADMIN — OXYCODONE HYDROCHLORIDE 5 MG: 5 TABLET ORAL at 11:32

## 2024-06-28 RX ADMIN — SENNOSIDES AND DOCUSATE SODIUM 1 TABLET: 50; 8.6 TABLET ORAL at 09:00

## 2024-06-28 RX ADMIN — LEVOTHYROXINE SODIUM 100 MCG: 0.1 TABLET ORAL at 04:18

## 2024-06-28 RX ADMIN — Medication 1 APPLICATOR: at 04:18

## 2024-06-28 RX ADMIN — SENNOSIDES AND DOCUSATE SODIUM 1 TABLET: 50; 8.6 TABLET ORAL at 21:58

## 2024-06-28 RX ADMIN — CEFAZOLIN 2 G: 2 INJECTION, POWDER, FOR SOLUTION INTRAMUSCULAR; INTRAVENOUS at 00:32

## 2024-06-28 RX ADMIN — DOCUSATE SODIUM 100 MG: 100 CAPSULE, LIQUID FILLED ORAL at 18:36

## 2024-06-28 RX ADMIN — ATORVASTATIN CALCIUM 40 MG: 40 TABLET, FILM COATED ORAL at 18:36

## 2024-06-28 RX ADMIN — LIDOCAINE 2 PATCH: 4 PATCH TOPICAL at 11:36

## 2024-06-28 RX ADMIN — DOCUSATE SODIUM 100 MG: 100 CAPSULE, LIQUID FILLED ORAL at 04:18

## 2024-06-28 RX ADMIN — POTASSIUM CHLORIDE AND SODIUM CHLORIDE: 900; 150 INJECTION, SOLUTION INTRAVENOUS at 00:30

## 2024-06-28 RX ADMIN — Medication 1 APPLICATOR: at 18:36

## 2024-06-28 ASSESSMENT — GAIT ASSESSMENTS
GAIT LEVEL OF ASSIST: CONTACT GUARD ASSIST
DEVIATION: BRADYKINETIC
DISTANCE (FEET): 100
ASSISTIVE DEVICE: FRONT WHEEL WALKER

## 2024-06-28 ASSESSMENT — COGNITIVE AND FUNCTIONAL STATUS - GENERAL
MOVING FROM LYING ON BACK TO SITTING ON SIDE OF FLAT BED: A LITTLE
MOBILITY SCORE: 18
STANDING UP FROM CHAIR USING ARMS: A LITTLE
HELP NEEDED FOR BATHING: A LOT
MOVING TO AND FROM BED TO CHAIR: A LITTLE
CLIMB 3 TO 5 STEPS WITH RAILING: A LITTLE
TURNING FROM BACK TO SIDE WHILE IN FLAT BAD: A LITTLE
PERSONAL GROOMING: A LITTLE
DAILY ACTIVITIY SCORE: 14
DRESSING REGULAR LOWER BODY CLOTHING: A LOT
EATING MEALS: A LITTLE
DRESSING REGULAR UPPER BODY CLOTHING: A LOT
SUGGESTED CMS G CODE MODIFIER MOBILITY: CK
SUGGESTED CMS G CODE MODIFIER DAILY ACTIVITY: CK
WALKING IN HOSPITAL ROOM: A LITTLE
TOILETING: A LOT

## 2024-06-28 ASSESSMENT — PAIN DESCRIPTION - PAIN TYPE
TYPE: ACUTE PAIN
TYPE: SURGICAL PAIN
TYPE: ACUTE PAIN
TYPE: SURGICAL PAIN

## 2024-06-28 NOTE — THERAPY
"Physical Therapy   Initial Evaluation     Patient Name: Victoriano Vasquez  Age:  84 y.o., Sex:  male  Medical Record #: [id number]  Today's Date: 6/28/2024     Precautions  Precautions: Fall Risk;Spinal / Back Precautions ;TLSO (Thoracolumbosacral orthosis)  Comments: TLSO when OOB    Assessment  Patient is an 84 y.o. male who was admitted with severe back pain secondary to and L2 compression fx and is now s/p T12-L4 fusion. PMH significant for HTN, HLD, aortic valve stenosis, Afib.    Pt received in bed and agreeable to PT evaluation. Pt provided with post-op lumbar spine handout in Indonesian and educated on precautions as they pertain to mobility, log roll technique for sup<>sit, use of TLSO per order, and activity recommendations to progress towards home. Pt required standby to contact guard assist to mobilize with a FWW and ambulated 100ft with no losses of balance. Difficulty obtaining prior level and assist available from family as pt tended to try and reply in broken English rather than through the . If family is comfortable providing standby to [facility] and is available 24/7 initially upon return home, pt may be able to discharge there with HHPT. Otherwise, recommend post-acute placement. Will follow for acute PT.    Plan    Physical Therapy Initial Treatment Plan   Treatment Plan : Bed Mobility, Equipment, Gait Training, Neuro Re-Education / Balance, Self Care / Home Evaluation, Stair Training, Therapeutic Activities, Therapeutic Exercise  Treatment Frequency: 5 Times per Week  Duration: Until Therapy Goals Met    DC Equipment Recommendations: Unable to determine at this time  Discharge Recommendations: Recommend post-acute placement for additional physical therapy services prior to discharge home (If family is comfortable providing 24/7 standby assist initially, may be able to return home with HHPT.)     Subjective    \"It feels so much better than before the surgery\"     Objective       06/28/24 9993 " "  Precautions   Precautions Fall Risk;Spinal / Back Precautions ;TLSO (Thoracolumbosacral orthosis)   Comments TLSO when OOB   Vitals   O2 Delivery Device None - Room Air   Pain 0 - 10 Group   Therapist Pain Assessment Post Activity Pain Same as Prior to Activity;Nurse Notified  (reports \"not much\" back pain, not rated)   Prior Living Situation   Prior Services Home-Independent   Housing / Facility 2 Story Apartment / Condo   Steps Into Home 9   Equipment Owned Front-Wheel Walker   Lives with - Patient's Self Care Capacity Spouse;Adult Children   Comments Pt reports living with his wife and son. His wife works 2 jobs and is not typically home during the day. Pt reports his son is sometimes home, but was unable to clarify.   Prior Level of Functional Mobility   Comments Pt reports he was independent prior to admission with no AD. Did report he tends to sit in his chair whenever his wife is at work and doesn't mobilize much.   History of Falls   History of Falls Yes   Date of Last Fall   (pt reports falls in the past)   Cognition    Level of Consciousness Alert   Comments Very pleasant and cooperative. However, pt tended to attempt to answer in English rather than utilizing the  service and at times did not answer the question that was asked.   Active ROM Lower Body    Active ROM Lower Body  WDL   Strength Lower Body   Comments BLE grossly 4/5 throughout   Sensation Lower Body   Comments Pt denies LE sensory changes   Lower Body Muscle Tone   Lower Body Muscle Tone  WDL   Coordination Lower Body    Coordination Lower Body  WDL   Balance Assessment   Sitting Balance (Static) Fair   Sitting Balance (Dynamic) Fair   Standing Balance (Static) Fair -   Standing Balance (Dynamic) Fair -   Weight Shift Sitting Fair   Weight Shift Standing Fair   Comments FWW in standing   Bed Mobility    Supine to Sit Standby Assist   Scooting Standby Assist   Rolling Standby Assist   Comments bed flat, cues for log roll to the R "   Gait Analysis   Gait Level Of Assist Contact Guard Assist   Assistive Device Front Wheel Walker   Distance (Feet) 100   # of Times Distance was Traveled 1   Deviation Bradykinetic   Comments Cues for FWW use, posture   Functional Mobility   Sit to Stand Contact Guard Assist   Bed, Chair, Wheelchair Transfer Contact Guard Assist   Transfer Method Stand Step   Mobility up with FWW   6 Clicks Assessment - How much HELP from from another person do you currently need... (If the patient hasn't done an activity recently, how much help from another person do you think he/she would need if he/she tried?)   Turning from your back to your side while in a flat bed without using bedrails? 3   Moving from lying on your back to sitting on the side of a flat bed without using bedrails? 3   Moving to and from a bed to a chair (including a wheelchair)? 3   Standing up from a chair using your arms (e.g., wheelchair, or bedside chair)? 3   Walking in hospital room? 3   Climbing 3-5 steps with a railing? 3   6 clicks Mobility Score 18   Short Term Goals    Short Term Goal # 1 Pt will transfer with FWW and supervision to progress function in 6 visits.   Short Term Goal # 2 Pt will ambulate 200ft with FWW and supervision to progress function in 6 visits.   Short Term Goal # 3 Pt will negotiate 5+ stairs with min A for home entry in 6 visits.   Education Group   Education Provided Role of Physical Therapist;Spine Precautions   Spine Precautions Patient Response Patient;Acceptance;Explanation;Handout;Verbal Demonstration;Action Demonstration   Role of Physical Therapist Patient Response Patient;Acceptance;Explanation;Verbal Demonstration   Physical Therapy Initial Treatment Plan    Treatment Plan  Bed Mobility;Equipment;Gait Training;Neuro Re-Education / Balance;Self Care / Home Evaluation;Stair Training;Therapeutic Activities;Therapeutic Exercise   Treatment Frequency 5 Times per Week   Duration Until Therapy Goals Met   Problem List     Problems Impaired Transfers;Impaired Ambulation;Decreased Activity Tolerance   Anticipated Discharge Equipment and Recommendations   DC Equipment Recommendations Unable to determine at this time   Discharge Recommendations Recommend post-acute placement for additional physical therapy services prior to discharge home  (If family is comfortable providing 24/7 standby assist initially, may be able to return home with HHPT.)   Interdisciplinary Plan of Care Collaboration   IDT Collaboration with  Nursing;Occupational Therapist   Patient Position at End of Therapy Seated  (OT present)   Collaboration Comments RN updated   Session Information   Date / Session Number  6/28-1 (1/5, 7/4)   Priority 4  (spine surgery)

## 2024-06-28 NOTE — OP REPORT
DATE OF SERVICE:  06/27/2024     PREOPERATIVE DIAGNOSIS:  Lumbar 2 burst fracture with worsening kyphotic   deformity and severe central canal stenosis.     POSTOPERATIVE DIAGNOSIS:  Lumbar 2 burst fracture with worsening kyphotic   deformity and severe central canal stenosis.     PROCEDURES:  1.  Posterior thoracic 12, L1, L2, L3, L4 pedicle screw instrumentation using   Medtronic Solera screws.  2.  Use of O-arm neuronavigation for pedicle screw placement.  3.  Posterolateral arthrodesis thoracic 12, lumbar 1, 2, 3, 4 posterolateral   arthrodesis.  4.  Lumbar 1 bilateral laminectomy, total facetectomy.  5.  Lumbar 2 laminectomy and total facetectomy bilaterally.  6.  Use of intraoperative neuromonitoring.  7.  Use of modifier 22 for extensive difficulty positioning the patient on the   table using the Kendall table.  8.  Use of locally harvested morselized autograft.  9.  Use of allograft.     SURGEON:  Ebenezer Christensen MD     ASSISTANT:  JDUAH Kamara     ANESTHESIA:  General.     COMPLICATIONS:  None.     ESTIMATED BLOOD LOSS:  200 mL.     DESCRIPTION OF PROCEDURE:  The patient was brought to the operating room and   identified in the usual fashion.  General endotracheal anesthesia was induced   by the anesthesia team.  The patient was then placed supine on the Kendall   table with flat plates.  All pressure points were meticulously padded.  We got   good baseline neuromonitoring.  We then sandwiched the patient using the   Kendall table and flipped him into a prone position.  This took an additional   30 minutes or so to prepare him for flip.  Once we flipped, there were no   changes to neuromonitoring.  All pressure points were meticulously padded.  We   marked a posterior thoracolumbar incision using fluoroscopic guidance.  He   was then prepped and draped in the usual sterile fashion.  Local anesthesia   was infiltrated in subcutaneous tissue.  A 10 blade was used to incise the   skin.   Dissection was carried down in a subperiosteal fashion bilaterally.    Retractors were put in place.  We then placed a spinous process clamp at the   spinous process of T11 at the level of the T12 transverse process.  We then   spun the O-arm with excellent accuracy.  We then placed pedicle screws in the   following sequence of events.  A navigated high-speed air drill was used to   drill a  hole.  A tap was then used to tap the pedicle to the appropriate   depth.  We then placed the appropriately sized screws based on the patient's   anatomy.  This was done first on the right side starting with T12 and then   extending all the way down through L4.  This was done in the same sequence of   events for all screws.  We then went to the left side and placed thoracic 12   through lumbar 4 screws on the left side as well in the same fashion.  There   were no complications with this.  Once all screws were placed, we then turned   our attention to completing a laminectomy first at lumbar 2 and then at lumbar   1 to make sure that we had adequately decompressed from the L1 pedicle all   the way down to the L3 pedicle.  This was done with a combination of Leksell   rongeur, high-speed air drill and then Kerrison 2 and 3 punches.  We had   excellent decompression.  Neuromonitoring actually improved when the   decompression was completed.  FloSeal gentle tamponade was used for hemostasis   along with some bipolar electrocautery.  We then placed the appropriately   sized rods with nut caps and everything was final tightened.  We got good   reduction of the kyphotic deformity.  Films were taken showing that all the   hardware looked to be in excellent position and we were at the correct levels.    We then decorticated the transverse processes and the lamina of thoracic 12   all the way through lumbar 4 using a high-speed air drill.  We then packed   locally harvested morselized autograft and allograft into the gutters    bilaterally.  We then left a Hemovac drain in the wound and then closed the   incision in layers and topped with staples and sterile dressing.  There were   no complications.  Again, neuromonitoring improved throughout the case.  I was   present and scrubbed for the entire procedure.        ______________________________  Ebenezer Christensen MD    CPD/VIN/JAN    DD:  06/28/2024 10:22  DT:  06/28/2024 11:25    Job#:  [id number]

## 2024-06-28 NOTE — PROGRESS NOTES
[facility] MEDICINE PROGRESS NOTE        Attending:   Tana Torres MD    Resident:   Debora Brumfield D.O.    PATIENT:   Victoriano Vasquez; [id number]; 1940    ID:   84 y.o. male with past medical history of HLD, HTN, Hypothyroidism, was admitted on 6/24/2024 for L2 compression fracture, sent in by neurosurgery now s/p T12-L4 fusion.      INTERVAL:   Pt was more disoriented and delirious after surgery.  UA returned with only trace blood and ketones, no signs of infection.  Neurosurgery did order PCA after spine fusion but d/t delirium, loading dose was skipped, and PCP put on low dose continuous morphine protocol.  Pt has telesitter.     Neurosurgery saw patient this morning, notes that he is not using pain meds much, okay to DC PCA and start oxy and muscle relaxants.  Covarrubias was DC'd this a.m.  They will DC HVAC after next check if the output is 30 cc or less.  He will likely need placement.    SUBJECTIVE:   Pt much more oriented this am, aware he is in the hospital for back surgery.  Wife feels he is doing much better.  Pt not in significant pain and grateful for the care.  He states he has not gotten up and walked since the surgery.    OBJECTIVE:  Vitals:    06/28/24 0010 06/28/24 0053 06/28/24 0204 06/28/24 0355   BP: 101/50   93/43   Pulse: 62   (!) 58   Resp: 16   16   Temp: 36.4 °C (97.5 °F)   36.4 °C (97.5 °F)   TempSrc: Temporal   Temporal   SpO2: 99% 98% 97% 95%   Weight:       Height:         No intake or output data in the 24 hours ending 06/27/24 0637    PHYSICAL EXAM:  Gen: Awake oriented pleasant.  HEENT: normocephalic, atraumatic, EOMI  CV: RRR w/o murmurs  Pulm: Normal effort, good chest rise, CTAB  Abdom: Not noticeably distended.  Ext: No edema, 2+ pulses bilaterally  : Covarrubias removed.    Spine: Drain containing blood coming from back.  Able to sit up with some difficulty but without significant pain.  Neuro:  no focal neurologic deficits       LABS:  Recent Labs     06/26/24  0847  "06/27/24  0604   WBC 9.1 9.0   RBC 4.60* 4.41*   HEMOGLOBIN 13.8* 13.0*   HEMATOCRIT 41.1* 39.2*   MCV 89.3 88.9   MCH 30.0 29.5   RDW 55.3* 55.5*   PLATELETCT 339 306   MPV 10.2 10.1     Recent Labs     06/26/24  0547 06/26/24  1215 06/27/24  0604   SODIUM 134*  --  135   POTASSIUM 4.1  --  3.7   CHLORIDE 98  --  100   CO2 17*  --  20   BUN 14  --  10   CREATININE 0.77  --  0.55   CALCIUM 9.2  --  8.4*   MAGNESIUM  --  1.9  --    PHOSPHORUS  --  3.5  --      Estimated GFR/CRCL = Estimated Creatinine Clearance: 92.2 mL/min (by C-G formula based on SCr of 0.55 mg/dL).  Recent Labs     06/26/24  0547 06/27/24  0604   GLUCOSE 93 86     Recent Labs     06/26/24  1215   AMMONIA 12     Recent Labs     06/26/24  1215   CPKTOTAL 37         No results for input(s): \"INR\", \"APTT\", \"FIBRINOGEN\" in the last 72 hours.    Invalid input(s): \"DIMER\"        IMAGING:  DX-PORTABLE FLUORO > 1 HOUR   Final Result      Portable fluoroscopy utilized for 7.4 seconds.         INTERPRETING LOCATION: [address] [place] [address], [id number]      DX-THORACOLUMBAR SPINE-2 VIEWS   Final Result      Digitized intraoperative radiograph is submitted for review. This examination is not for diagnostic purpose but for guidance during a surgical procedure. Please see the patient's chart for full procedural details.         INTERPRETING LOCATION: [address] [place] [address], [id number]      DX-O-ARM   Final Result      Portable O-arm utilized for 3.23 seconds.      INTERPRETING LOCATION: [address] [place] [address], [id number]      DX-CHEST-PORTABLE (1 VIEW)   Final Result      1.  Hypoinflation without other evidence for acute cardiopulmonary disease.   2.  Evidence of old granulomatous disease.      CT-LSPINE W/O PLUS RECONS   Final Result         1. Burst fracture of L2 is again noted with severe canal stenosis.          MEDS:  Current Facility-Administered Medications   Medication Last Admin    Pharmacy Consult Request ...Pain Management Review 1 Each      MD ALERT...DO NOT " ADMINISTER NSAIDS or ASPIRIN unless ORDERED By Neurosurgery 1 Each      docusate sodium (Colace) capsule 100 mg 100 mg at 06/28/24 0418    senna-docusate (Pericolace Or Senokot S) 8.6-50 MG per tablet 1 Tablet 1 Tablet at 06/27/24 2023    senna-docusate (Pericolace Or Senokot S) 8.6-50 MG per tablet 1 Tablet      polyethylene glycol/lytes (Miralax) Packet 1 Packet      magnesium hydroxide (Milk Of Magnesia) suspension 30 mL      bisacodyl (Dulcolax) suppository 10 mg      sodium phosphate (Fleet) enema 133 mL      0.9 % NaCl with KCl 20 mEq infusion New Bag at 06/28/24 0030    morphine 4 MG/ML injection 2 mg      diphenhydrAMINE (Benadryl) tablet/capsule 25 mg      Or    diphenhydrAMINE (Benadryl) injection 25 mg      ondansetron (Zofran) syringe/vial injection 4 mg      ondansetron (Zofran ODT) dispertab 4 mg      tizanidine (Zanaflex) tablet 2 mg      labetalol (Normodyne/Trandate) injection 10 mg      hydrALAZINE (Apresoline) injection 10 mg      morphine 1 mg/mL in 50 mL (PCA) Rate Verify at 06/27/24 1907    Nozin nasal  swab 1 Applicator at 06/28/24 0418    lidocaine (Asperflex) 4 % patch 2 Patch 2 Patch at 06/26/24 1145    acetaminophen (Tylenol) tablet 650 mg      labetalol (Normodyne/Trandate) injection 10 mg      amLODIPine (Norvasc) tablet 5 mg 5 mg at 06/27/24 0531    atorvastatin (Lipitor) tablet 40 mg 40 mg at 06/27/24 2023    levothyroxine (Synthroid) tablet 100 mcg 100 mcg at 06/28/24 0418    metoprolol SR (Toprol XL) tablet 25 mg 25 mg at 06/27/24 0531       ASSESSMENT/PLAN:  84 y.o. male with past medical history of HLD, HTN, Hypothyroidism, was admitted on 6/24/2024 for L2 compression fracture, sent in by neurosurgery now s/p T12-L4 fusion on 6/27/24. POD1      * Lumbar compression fracture, closed, initial encounter (HCC)- (present on admission)  S/p T12-L4 fusion on 6/27 by neurosurgery.  - PCA pump Dc'd 6/28, pt not in sig pain.  PRN oxy and tizanidine on.    - PT consulted - awaiting  recs, per neurosurgery pt will likely need placement   - Follow neurosurgery recommendations and discharge plan  - Fall precautions  - Regular diet, advance as tolerated  - TLSO brace for out of bed per neurosurgery note     Delirium - resolved  Assessment & Plan   Patient A&Ox1 morning of 6/26, wife noticed shortly after admission that he was more confused.    Delirium workup 6/26 essentially negative: No electrolyte abnormalities, A1c 5.5, glucose WNL, lactic acid WNL, ammonia WNL, CPK WNL, UA w/o infection.  Chest x-ray 6/26 showed evidence of old granulomatous disease with some hypoinflation but essentially negative.  Slight metabolic acidosis that was new on 6/26 but repeat BMP on 6/27 showed resolution.  Patient was started on fluids 6/26.  Hypertension controlled.  Patient had received 1 dose of oxycodone in past 24 hours on a.m. of 6/26, did not suspect this to be cause of delirium but held all opiates out of precaution prior to surgery without sig effect.   Pt significantly improved am of 6/28  - Due to patient repeatedly attempting to leave the room, bedside sitter ordered 6/26 - can likely DC tomorrow       Normocytic anemia  Assessment & Plan  Hgb 13.9 on admission, 12.8 on 6/25. Most likely in setting of NPO for procedure. Repeat CBC showed hemoglobin 13.8.  Continue to monitor.  No active signs of bleeding.     HLD (hyperlipidemia)  Assessment & Plan  Continue home atorvastatin     Primary hypertension- (present on admission)  Assessment & Plan  Continue home amlodipine  Continue home metoprolol     Hypothyroidism- (present on admission)  Assessment & Plan  Continue home levothyroxine        Core Measures:  Fluids: NS @100  Lines: PIV  Abx: None  Diet: Regular   PPX: SCDs/TEDs, -restart PPx if patient will have prolonged stay    #DISPOSITION  - Inpatient for post-op management, awaiting PT recs for placement      CODE STATUS: Full code      Debora Brumfield D.O.  PGY-2  [facility]  Res[facility]

## 2024-06-28 NOTE — DISCHARGE PLANNING
Case Management Discharge Planning    Admission Date: 6/24/2024  GMLOS: 2.8  ALOS: 4    6-Clicks ADL Score: 14  6-Clicks Mobility Score: 12    Anticipated Discharge Dispo: Discharge Disposition: Discharged to home/self care (01)    DME Needed: No    Action(s) Taken: LMSW received notice from PT stating that pt could potentially DC home with help if family can provide support and can accommodate standby assist. LMSW met with pt at bedside to attempt assessment, pt was confused and stated that he lives in [facility]. LMSW attempted to call pt's spouse, Breonna, to complete assessment, left VM with callback request.    Escalations Completed: None    Medically Clear: No    Next Steps: LMSW to attempt assessment with pt's spouse.    Barriers to Discharge: Medical clearance    Is the patient up for discharge tomorrow: No

## 2024-06-28 NOTE — CARE PLAN
The patient is Stable - Low risk of patient condition declining or worsening    Shift Goals  Clinical Goals: orientation; pain control; mobility; urine output  Patient Goals: comfort; mobility  Family Goals: comfrot    Progress made toward(s) clinical / shift goals:    Problem: Pain - Standard  Goal: Alleviation of pain or a reduction in pain to the patient’s comfort goal  Outcome: Progressing     Problem: Knowledge Deficit - Standard  Goal: Patient and family/care givers will demonstrate understanding of plan of care, disease process/condition, diagnostic tests and medications  Outcome: Progressing     Problem: Fall Risk  Goal: Patient will remain free from falls  Outcome: Progressing     Problem: Skin Integrity  Goal: Skin integrity is maintained or improved  Outcome: Progressing       Patient is not progressing towards the following goals:

## 2024-06-28 NOTE — PROGRESS NOTES
4 Eyes Skin Assessment Completed by KENN Huerta and KENN Nuñez.    Head WDL  Ears WDL  Nose WDL  Mouth WDL  Neck WDL  Breast/Chest WDL  Shoulder Blades WDL  Spine Incision dressing cdi      (R) Arm/Elbow/Hand WDL  (L) Arm/Elbow/Hand WDL  Abdomen WDL  Groin WDL  Scrotum/Coccyx/Buttocks WDL  (R) Leg WDL  (L) Leg WDL  (R) Heel/Foot/Toe WDL  (L) Heel/Foot/Toe WDL          Devices In Places Blood Pressure Cuff, Pulse Ox, and SCD's      Interventions In Place Gray Ear Foams and NC W/Ear Foams    Possible Skin Injury No    Pictures Uploaded Into Epic N/A  Wound Consult Placed N/A  RN Wound Prevention Protocol Ordered No

## 2024-06-28 NOTE — CARE PLAN
The patient is Watcher - Medium risk of patient condition declining or worsening    Shift Goals  Clinical Goals: safety, pca pump, abx  Patient Goals: rest  Family Goals: rest    Progress made toward(s) clinical / shift goals:   Problem: Pain - Standard  Goal: Alleviation of pain or a reduction in pain to the patient’s comfort goal  Outcome: Progressing     Problem: Knowledge Deficit - Standard  Goal: Patient and family/care givers will demonstrate understanding of plan of care, disease process/condition, diagnostic tests and medications  Outcome: Progressing     Problem: Fall Risk  Goal: Patient will remain free from falls  Outcome: Progressing

## 2024-06-28 NOTE — PROGRESS NOTES
Neurosurgery Progress Note    Subjective:  Pt seen by Dr. Christensen  Pain tolerable - has not been using pca much    No leg pain     Exam:    Primarily Occitan speaking  LE str 5/5   Inc c/d/I with dressing /hvac     BP  Min: 92/49  Max: 111/54  Pulse  Av.6  Min: 54  Max: 69  Resp  Av.6  Min: 10  Max: 33  Temp  Av.4 °C (97.5 °F)  Min: 36.1 °C (96.9 °F)  Max: 36.6 °C (97.9 °F)  SpO2  Av.6 %  Min: 95 %  Max: 100 %    No data recorded    Recent Labs     24  0847 24  0604 24  0615   WBC 9.1 9.0 11.2*   RBC 4.60* 4.41* 3.63*   HEMOGLOBIN 13.8* 13.0* 11.2*   HEMATOCRIT 41.1* 39.2* 33.3*   MCV 89.3 88.9 91.7   MCH 30.0 29.5 30.9   MCHC 33.6 33.2 33.6   RDW 55.3* 55.5* 57.6*   PLATELETCT 339 306 249   MPV 10.2 10.1 10.1     Recent Labs     24  0547 24  0604 24  0615   SODIUM 134* 135 136   POTASSIUM 4.1 3.7 4.9   CHLORIDE 98 100 105   CO2 17* 20 21   GLUCOSE 93 86 113*   BUN 14 10 14   CREATININE 0.77 0.55 0.61   CALCIUM 9.2 8.4* 7.4*                 Intake/Output                         24 - 24 0659 24 - 24 0659      Total  Total                 Intake    I.V.  1500  -- 1500  --  -- --    Volume (mL) (Lactated Ringers) 1500 -- 1500 -- -- --    Total Intake 1500 -- 1500 -- -- --       Output    Urine  1290  975 2265  --  -- --    Urine 1250 -- 1250 -- -- --    Urine Void (mL) -- 300 300 -- -- --    Output (mL) ([REMOVED] Urethral Catheter Non-latex;Temperature probe 16 Fr. 24 0626) 40 675 715 -- -- --    Drains  230  75 305  --  -- --    Output (mL) (Closed/Suction Drain 1 Inferior Back Hemovac) 230 75 305 -- -- --    Blood  150  -- 150  --  -- --    Est. Blood Loss 150 -- 150 -- -- --    Total Output 1670 1050 2720 -- -- --       Net I/O     -170 -1050 -1220 -- -- --              Intake/Output Summary (Last 24 hours) at 2024 1011  Last data filed at 2024 0600  Gross per 24 hour    Intake 500 ml   Output 1520 ml   Net -1020 ml             Pharmacy Consult Request  1 Each PHARMACY TO DOSE    MD ALERT...DO NOT ADMINISTER NSAIDS or ASPIRIN unless ORDERED By Neurosurgery  1 Each PRN    docusate sodium  100 mg BID    senna-docusate  1 Tablet Nightly    senna-docusate  1 Tablet Q24HRS PRN    polyethylene glycol/lytes  1 Packet BID PRN    magnesium hydroxide  30 mL QDAY PRN    bisacodyl  10 mg Q24HRS PRN    sodium phosphate  1 Each Once PRN    0.9 % NaCl with KCl 20 mEq 1,000 mL   Continuous    morphine injection  2 mg Once PRN    diphenhydrAMINE  25 mg Q6HRS PRN    Or    diphenhydrAMINE  25 mg Q6HRS PRN    ondansetron  4 mg Q4HRS PRN    ondansetron  4 mg Q4HRS PRN    tizanidine  2 mg TID PRN    labetalol  10 mg Q HOUR PRN    hydrALAZINE  10 mg Q HOUR PRN    morphine   Continuous    Nozin nasal  swab  1 Applicator BID    lidocaine  2 Patch Q24HR    acetaminophen  650 mg Q6HRS PRN    labetalol  10 mg Q4HRS PRN    amLODIPine  5 mg DAILY    atorvastatin  40 mg Q EVENING    levothyroxine  100 mcg AM ES    metoprolol SR  25 mg BID       Assessment and Plan:  Hospital day #3 L2 burst fx   POD #1 T12-L4 fusion   Prophylactic anticoagulation: no    PT/OT - TLSO when oob   Pain control - dc pca, start oxy, prn muscle relaxants  Dc luna   Bowel protocol   DC hvac after next check if 30 cc or less   Will likely need placement - [facility]

## 2024-06-28 NOTE — PROGRESS NOTES
Telesitter discontinued for improved behavior. Patient is alert and oriented.   Rafaela from telemonitor room will have camera picked up.

## 2024-06-29 LAB
ANION GAP SERPL CALC-SCNC: 9 MMOL/L (ref 7–16)
BUN SERPL-MCNC: 10 MG/DL (ref 8–22)
CALCIUM SERPL-MCNC: 7.7 MG/DL (ref 8.5–10.5)
CHLORIDE SERPL-SCNC: 103 MMOL/L (ref 96–112)
CO2 SERPL-SCNC: 22 MMOL/L (ref 20–33)
CREAT SERPL-MCNC: 0.47 MG/DL (ref 0.5–1.4)
ERYTHROCYTE [DISTWIDTH] IN BLOOD BY AUTOMATED COUNT: 59.9 FL (ref 35.9–50)
GFR SERPLBLD CREATININE-BSD FMLA CKD-EPI: 101 ML/MIN/1.73 M 2
GLUCOSE SERPL-MCNC: 86 MG/DL (ref 65–99)
HCT VFR BLD AUTO: 33.9 % (ref 42–52)
HGB BLD-MCNC: 11.1 G/DL (ref 14–18)
MCH RBC QN AUTO: 30.7 PG (ref 27–33)
MCHC RBC AUTO-ENTMCNC: 32.7 G/DL (ref 32.3–36.5)
MCV RBC AUTO: 93.9 FL (ref 81.4–97.8)
PLATELET # BLD AUTO: 233 K/UL (ref 164–446)
PMV BLD AUTO: 10 FL (ref 9–12.9)
POTASSIUM SERPL-SCNC: 4.1 MMOL/L (ref 3.6–5.5)
RBC # BLD AUTO: 3.61 M/UL (ref 4.7–6.1)
SODIUM SERPL-SCNC: 134 MMOL/L (ref 135–145)
WBC # BLD AUTO: 10.9 K/UL (ref 4.8–10.8)

## 2024-06-29 PROCEDURE — 700101 HCHG RX REV CODE 250: Performed by: NURSE PRACTITIONER

## 2024-06-29 PROCEDURE — 51798 US URINE CAPACITY MEASURE: CPT

## 2024-06-29 PROCEDURE — 700102 HCHG RX REV CODE 250 W/ 637 OVERRIDE(OP): Performed by: NURSE PRACTITIONER

## 2024-06-29 PROCEDURE — 36415 COLL VENOUS BLD VENIPUNCTURE: CPT

## 2024-06-29 PROCEDURE — 700102 HCHG RX REV CODE 250 W/ 637 OVERRIDE(OP)

## 2024-06-29 PROCEDURE — A9270 NON-COVERED ITEM OR SERVICE: HCPCS

## 2024-06-29 PROCEDURE — 99232 SBSQ HOSP IP/OBS MODERATE 35: CPT | Mod: GC | Performed by: HOSPITALIST

## 2024-06-29 PROCEDURE — A9270 NON-COVERED ITEM OR SERVICE: HCPCS | Performed by: NURSE PRACTITIONER

## 2024-06-29 PROCEDURE — 700101 HCHG RX REV CODE 250

## 2024-06-29 PROCEDURE — 770001 HCHG ROOM/CARE - MED/SURG/GYN PRIV*

## 2024-06-29 PROCEDURE — 80048 BASIC METABOLIC PNL TOTAL CA: CPT

## 2024-06-29 PROCEDURE — 85027 COMPLETE CBC AUTOMATED: CPT

## 2024-06-29 RX ORDER — POLYETHYLENE GLYCOL 3350 17 G/17G
1 POWDER, FOR SOLUTION ORAL DAILY
Status: DISCONTINUED | OUTPATIENT
Start: 2024-06-29 | End: 2024-06-30

## 2024-06-29 RX ADMIN — ATORVASTATIN CALCIUM 40 MG: 40 TABLET, FILM COATED ORAL at 17:21

## 2024-06-29 RX ADMIN — Medication 1 APPLICATOR: at 07:04

## 2024-06-29 RX ADMIN — DOCUSATE SODIUM 100 MG: 100 CAPSULE, LIQUID FILLED ORAL at 07:03

## 2024-06-29 RX ADMIN — LEVOTHYROXINE SODIUM 100 MCG: 0.1 TABLET ORAL at 07:04

## 2024-06-29 RX ADMIN — METOPROLOL SUCCINATE 25 MG: 25 TABLET, EXTENDED RELEASE ORAL at 07:04

## 2024-06-29 RX ADMIN — METOPROLOL SUCCINATE 25 MG: 25 TABLET, EXTENDED RELEASE ORAL at 17:21

## 2024-06-29 RX ADMIN — LIDOCAINE 2 PATCH: 4 PATCH TOPICAL at 13:04

## 2024-06-29 RX ADMIN — DOCUSATE SODIUM 100 MG: 100 CAPSULE, LIQUID FILLED ORAL at 17:22

## 2024-06-29 RX ADMIN — AMLODIPINE BESYLATE 5 MG: 5 TABLET ORAL at 07:04

## 2024-06-29 RX ADMIN — OXYCODONE HYDROCHLORIDE 5 MG: 5 TABLET ORAL at 13:02

## 2024-06-29 RX ADMIN — POLYETHYLENE GLYCOL 3350 1 PACKET: 17 POWDER, FOR SOLUTION ORAL at 20:20

## 2024-06-29 RX ADMIN — Medication 1 APPLICATOR: at 17:22

## 2024-06-29 RX ADMIN — MAGNESIUM HYDROXIDE 30 ML: 1200 LIQUID ORAL at 17:22

## 2024-06-29 RX ADMIN — POTASSIUM CHLORIDE AND SODIUM CHLORIDE: 900; 150 INJECTION, SOLUTION INTRAVENOUS at 02:45

## 2024-06-29 RX ADMIN — SENNOSIDES AND DOCUSATE SODIUM 1 TABLET: 50; 8.6 TABLET ORAL at 20:20

## 2024-06-29 ASSESSMENT — PAIN DESCRIPTION - PAIN TYPE
TYPE: SURGICAL PAIN
TYPE: ACUTE PAIN;SURGICAL PAIN
TYPE: SURGICAL PAIN
TYPE: SURGICAL PAIN;ACUTE PAIN

## 2024-06-29 NOTE — DISCHARGE PLANNING
@6389  PT/OT recommending placement. Referrals sent to [place].     @1153  No acceptances yet. Created PASRR: [id number]

## 2024-06-29 NOTE — PROGRESS NOTES
Bedside report received, Assumed care of patient 1900   Patient is A&O 4 , pt calls for assistance appropriately  Patient pain level 0/10    Patient on 1L nasal cannula  Mobility unable to ambulate  Assistance level max  Voiding condom cath/ incontinence  Last BM 6/26 incontinent    Hourly rounding in place, call light within reach, bed locked in lowest position. No needs expressed at this time.

## 2024-06-29 NOTE — PROGRESS NOTES
[facility] MEDICINE PROGRESS NOTE        Attending:   Bobbi Sanchez MD    Resident:   Debora Brumfield D.O.    PATIENT:   Victoriano Vasquez; [id number]; 1940    ID:   84 y.o. male with past medical history of HLD, HTN, Hypothyroidism, was admitted on 6/24/2024 for L2 compression fracture, sent in by neurosurgery now s/p T12-L4 fusion.      INTERVAL:   Covarrubias was removed yesterday, patient was able to void.    PT and OT recommended postacute placement.    Neurosurgery saw patient today, notes no prophylactic anticoagulation.  Continue oxycodone and muscle relaxants as needed for pain control.  TLSO when OOB.  Has not had a BM yet.  Plan to DC HVAC after next check of 30 cc or less.  Would like patient to be placed in SNF.    SUBJECTIVE:   Sleeping soundly on initial exam.  Patient feeling well, no complaints.    OBJECTIVE:  Vitals:    06/28/24 1529 06/28/24 2037 06/28/24 2056 06/29/24 0510   BP: 108/40 114/53  124/52   Pulse: 78 70  63   Resp: 15 16  16   Temp: 36.7 °C (98.1 °F) 36.8 °C (98.2 °F)  36.6 °C (97.9 °F)   TempSrc: Temporal Temporal  Temporal   SpO2: 92% 97% 98% 98%   Weight:       Height:         No intake or output data in the 24 hours ending 06/27/24 0637    PHYSICAL EXAM:  Gen: Sleeping soundly or lying comfortably in bed  HEENT: normocephalic, atraumatic, EOMI  CV: RRR w/o murmurs  Pulm: Normal effort, good chest rise, CTAB  Abdom: Not noticeably distended.  Ext: No edema,   : Covarrubias removed.    Spine: Drain containing blood coming from back.  No significant spinal tenderness.  Neuro:  no focal neurologic deficits       LABS:  Recent Labs     06/26/24  0847 06/27/24  0604 06/28/24  0615   WBC 9.1 9.0 11.2*   RBC 4.60* 4.41* 3.63*   HEMOGLOBIN 13.8* 13.0* 11.2*   HEMATOCRIT 41.1* 39.2* 33.3*   MCV 89.3 88.9 91.7   MCH 30.0 29.5 30.9   RDW 55.3* 55.5* 57.6*   PLATELETCT 339 306 249   MPV 10.2 10.1 10.1     Recent Labs     06/26/24  1215 06/27/24  0604 06/28/24  0615   SODIUM  --  135 136  "  POTASSIUM  --  3.7 4.9   CHLORIDE  --  100 105   CO2  --  20 21   BUN  --  10 14   CREATININE  --  0.55 0.61   CALCIUM  --  8.4* 7.4*   MAGNESIUM 1.9  --   --    PHOSPHORUS 3.5  --   --    ALBUMIN  --   --  2.6*     Estimated GFR/CRCL = Estimated Creatinine Clearance: 83.1 mL/min (by C-G formula based on SCr of 0.61 mg/dL).  Recent Labs     06/27/24  0604 06/28/24  0615   GLUCOSE 86 113*     Recent Labs     06/26/24  1215 06/28/24  0615   ASTSGOT  --  18   ALTSGPT  --  7   TBILIRUBIN  --  0.3   ALKPHOSPHAT  --  108*   GLOBULIN  --  3.3   AMMONIA 12  --      Recent Labs     06/26/24  1215   CPKTOTAL 37         No results for input(s): \"INR\", \"APTT\", \"FIBRINOGEN\" in the last 72 hours.    Invalid input(s): \"DIMER\"        IMAGING:  DX-PORTABLE FLUORO > 1 HOUR   Final Result      Portable fluoroscopy utilized for 7.4 seconds.         INTERPRETING LOCATION: [address] [facility], [address]      DX-THORACOLUMBAR SPINE-2 VIEWS   Final Result      Digitized intraoperative radiograph is submitted for review. This examination is not for diagnostic purpose but for guidance during a surgical procedure. Please see the patient's chart for full procedural details.         INTERPRETING LOCATION: [address] [facility], [facility], [address]      DX-O-ARM   Final Result      Portable O-arm utilized for 3.23 seconds.      INTERPRETING LOCATION: [address] [facility], [address]      DX-CHEST-PORTABLE (1 VIEW)   Final Result      1.  Hypoinflation without other evidence for acute cardiopulmonary disease.   2.  Evidence of old granulomatous disease.      CT-LSPINE W/O PLUS RECONS   Final Result         1. Burst fracture of L2 is again noted with severe canal stenosis.          MEDS:  Current Facility-Administered Medications   Medication Last Admin    oxyCODONE immediate-release (Roxicodone) tablet 5 mg 5 mg at 06/28/24 1132    oxyCODONE immediate release (Roxicodone) tablet 10 mg      morphine 4 MG/ML injection 2 mg      Pharmacy Consult Request ...Pain " Management Review 1 Each      MD ALERT...DO NOT ADMINISTER NSAIDS or ASPIRIN unless ORDERED By Neurosurgery 1 Each      docusate sodium (Colace) capsule 100 mg 100 mg at 06/28/24 1836    senna-docusate (Pericolace Or Senokot S) 8.6-50 MG per tablet 1 Tablet 1 Tablet at 06/28/24 2158    senna-docusate (Pericolace Or Senokot S) 8.6-50 MG per tablet 1 Tablet 1 Tablet at 06/28/24 0900    polyethylene glycol/lytes (Miralax) Packet 1 Packet      magnesium hydroxide (Milk Of Magnesia) suspension 30 mL      bisacodyl (Dulcolax) suppository 10 mg      sodium phosphate (Fleet) enema 133 mL      0.9 % NaCl with KCl 20 mEq infusion New Bag at 06/29/24 0245    diphenhydrAMINE (Benadryl) tablet/capsule 25 mg      Or    diphenhydrAMINE (Benadryl) injection 25 mg      ondansetron (Zofran) syringe/vial injection 4 mg      ondansetron (Zofran ODT) dispertab 4 mg      tizanidine (Zanaflex) tablet 2 mg      labetalol (Normodyne/Trandate) injection 10 mg      hydrALAZINE (Apresoline) injection 10 mg      Nozin nasal  swab 1 Applicator at 06/28/24 1836    lidocaine (Asperflex) 4 % patch 2 Patch 2 Patch at 06/28/24 1136    acetaminophen (Tylenol) tablet 650 mg      labetalol (Normodyne/Trandate) injection 10 mg      amLODIPine (Norvasc) tablet 5 mg 5 mg at 06/27/24 0531    atorvastatin (Lipitor) tablet 40 mg 40 mg at 06/28/24 1836    levothyroxine (Synthroid) tablet 100 mcg 100 mcg at 06/28/24 0418    metoprolol SR (Toprol XL) tablet 25 mg 25 mg at 06/27/24 0531       ASSESSMENT/PLAN:  84 y.o. male with past medical history of HLD, HTN, Hypothyroidism, was admitted on 6/24/2024 for L2 compression fracture, sent in by neurosurgery now s/p T12-L4 fusion on 6/27/24. POD2    * Lumbar compression fracture, closed, initial encounter ([facility])- (present on admission)  S/p T12-L4 fusion on 6/27 by neurosurgery.  POD 2  - PCA pump Dc'd 6/28, pt not in sig pain.  PRN oxy and tizanidine on.    - PT consulted -recommend acute placement.  -  Neurosurgery recommendations: No DVT PPx, TLSO when OOB, DC HVAC when output 30 cc or less.  - Fall precautions  - Regular diet, advance as tolerated  -MiraLAX added to scheduled bowel regimen, patient needs to have BM.    Delirium - resolved  Assessment & Plan   Patient A&Ox1 morning of 6/26, wife noticed shortly after admission that he was more confused.    Delirium workup 6/26 essentially negative: No electrolyte abnormalities, A1c 5.5, glucose WNL, lactic acid WNL, ammonia WNL, CPK WNL, UA w/o infection.  Chest x-ray 6/26 showed evidence of old granulomatous disease with some hypoinflation but essentially negative.  Slight metabolic acidosis that was new on 6/26 but repeat BMP on 6/27 showed resolution.  Patient was started on fluids 6/26.  Hypertension controlled.  Patient had received 1 dose of oxycodone in past 24 hours on a.m. of 6/26, did not suspect this to be cause of delirium but held all opiates out of precaution prior to surgery without sig effect.   Pt significantly improved am of 6/28  - Due to patient repeatedly attempting to leave the room, bedside sitter ordered 6/26 - can likely DC at this time.       Normocytic anemia  Assessment & Plan  Hgb 13.9 on admission, 12.8 on 6/25. Most likely in setting of NPO for procedure. Repeat CBC showed hemoglobin 13.8.  Continue to monitor.  No active signs of bleeding.     HLD (hyperlipidemia)  Assessment & Plan  Continue home atorvastatin     Primary hypertension- (present on admission)  Assessment & Plan  Continue home amlodipine  Continue home metoprolol     Hypothyroidism- (present on admission)  Assessment & Plan  Continue home levothyroxine        Core Measures:  Fluids: NS @100  Lines: PIV  Abx: None  Diet: Regular   PPX: SCDs/TEDs, -restart PPx if patient will have prolonged stay    #DISPOSITION  - Inpatient for post-op management and postacute placement solidification.      CODE STATUS: Full code      Debora Brumfield D.O.  PGY-2  [facility]  Residency

## 2024-06-29 NOTE — PROGRESS NOTES
Neurosurgery Progress Note    Subjective:  Having some back pain, improving  Denies leg pain, feels strong but also fatigued  Less confused today per bedside RN and family    Exam:  Primarily Yakut speaking  Alert, oriented to self and situation  LE str    Inc c/d/I with dressing  hvac output 55mL/8hr, sanguineous    BP  Min: 108/40  Max: 124/52  Pulse  Av.8  Min: 63  Max: 78  Resp  Av  Min: 15  Max: 17  Temp  Av.7 °C (98 °F)  Min: 36.5 °C (97.7 °F)  Max: 36.8 °C (98.2 °F)  SpO2  Av.8 %  Min: 92 %  Max: 98 %    No data recorded    Recent Labs     24  0604 24  0615 24  06   WBC 9.0 11.2* 10.9*   RBC 4.41* 3.63* 3.61*   HEMOGLOBIN 13.0* 11.2* 11.1*   HEMATOCRIT 39.2* 33.3* 33.9*   MCV 88.9 91.7 93.9   MCH 29.5 30.9 30.7   MCHC 33.2 33.6 32.7   RDW 55.5* 57.6* 59.9*   PLATELETCT 306 249 233   MPV 10.1 10.1 10.0     Recent Labs     24  0604 24  0615 24  0613   SODIUM 135 136 134*   POTASSIUM 3.7 4.9 4.1   CHLORIDE 100 105 103   CO2 20 21 22   GLUCOSE 86 113* 86   BUN 10 14 10   CREATININE 0.55 0.61 0.47*   CALCIUM 8.4* 7.4* 7.7*                 Intake/Output                         24 - 24 0659 24 - 24 0659      Total  Total                 Intake    P.O.  --  -- --  360  -- 360    P.O. -- -- -- 360 -- 360    Total Intake -- -- -- 360 -- 360       Output    Urine  50  400 450  --  -- --    Number of Times Voided -- 1 x 1 x 1 x -- 1 x    Urine Void (mL) -- 400 400 -- -- --    Output (mL) ([REMOVED] Urethral Catheter Non-latex;Temperature probe 16 Fr. 24 0626) 50 -- 50 -- -- --    Drains  50  125 175  20  -- 20    Output (mL) ([REMOVED] Closed/Suction Drain 1 Inferior Back Hemovac 24 1312) 50 125 175 20 -- 20    Total Output 100 552 625 20 -- 20       Net I/O     -100 -525 -625 340 -- 340              Intake/Output Summary (Last 24 hours) at 2024 1944  Last data filed at  6/29/2024 1117  Gross per 24 hour   Intake 360 ml   Output 545 ml   Net -185 ml       $ Bladder Scan Results (mL): 74     oxyCODONE immediate-release  5 mg Q4HRS PRN    oxyCODONE immediate-release  10 mg Q4HRS PRN    morphine injection  2 mg Q3HRS PRN    Pharmacy Consult Request  1 Each PHARMACY TO DOSE    MD ALERT...DO NOT ADMINISTER NSAIDS or ASPIRIN unless ORDERED By Neurosurgery  1 Each PRN    docusate sodium  100 mg BID    senna-docusate  1 Tablet Nightly    senna-docusate  1 Tablet Q24HRS PRN    polyethylene glycol/lytes  1 Packet BID PRN    magnesium hydroxide  30 mL QDAY PRN    bisacodyl  10 mg Q24HRS PRN    sodium phosphate  1 Each Once PRN    0.9 % NaCl with KCl 20 mEq 1,000 mL   Continuous    diphenhydrAMINE  25 mg Q6HRS PRN    Or    diphenhydrAMINE  25 mg Q6HRS PRN    ondansetron  4 mg Q4HRS PRN    ondansetron  4 mg Q4HRS PRN    tizanidine  2 mg TID PRN    labetalol  10 mg Q HOUR PRN    hydrALAZINE  10 mg Q HOUR PRN    Nozin nasal  swab  1 Applicator BID    lidocaine  2 Patch Q24HR    acetaminophen  650 mg Q6HRS PRN    labetalol  10 mg Q4HRS PRN    amLODIPine  5 mg DAILY    atorvastatin  40 mg Q EVENING    levothyroxine  100 mcg AM ES    metoprolol SR  25 mg BID       Assessment and Plan:  Hospital day #4 L2 burst fx   POD #2 T12-L4 fusion   Prophylactic anticoagulation: no      PT/OT - TLSO when oob   Pain control - oxy, prn muscle relaxants  Bowel protocol, no BM yet   DC hvac after next check if 30 cc or less   Will likely need placement, SNF referrals placed - [facility]

## 2024-06-29 NOTE — CARE PLAN
The patient is Stable - Low risk of patient condition declining or worsening    Shift Goals  Clinical Goals: Patient will recieve q6h bladder scans, will void during this shift  Patient Goals: comfort  Family Goals: no family present    Progress made toward(s) clinical / shift goals:  Patient educated on 0-10 pain scale via . Patient declined any pain interventions this shift, pain remained at 3/10 or below for duration. Patient bladder scanned per protocol, patient encouraged to ambulate out of bed to bathroom to void. Patient able to fully void bladder, from 480ml to 80ml. Patient ambulated to bathroom with front wheel walker and one person assist this shift, gait shuffle but steady.    Problem: Pain - Standard  Goal: Alleviation of pain or a reduction in pain to the patient’s comfort goal  6/29/2024 0455 by Giselle Johnson R.N.  Outcome: Progressing     Problem: Urinary Elimination  Goal: Establish and maintain regular urinary output  Outcome: Progressing     Problem: Mobility  Goal: Patient's capacity to carry out activities will improve  Outcome: Progressing

## 2024-06-30 VITALS
RESPIRATION RATE: 16 BRPM | TEMPERATURE: 97.9 F | BODY MASS INDEX: 30.22 KG/M2 | SYSTOLIC BLOOD PRESSURE: 98 MMHG | OXYGEN SATURATION: 92 % | HEART RATE: 71 BPM | WEIGHT: 177 LBS | HEIGHT: 64 IN | DIASTOLIC BLOOD PRESSURE: 48 MMHG

## 2024-06-30 PROBLEM — D72.829 LEUKOCYTOSIS: Status: ACTIVE | Noted: 2024-06-30

## 2024-06-30 PROBLEM — E63.9 POOR NUTRITION: Status: ACTIVE | Noted: 2024-06-30

## 2024-06-30 PROBLEM — R35.0 URINARY FREQUENCY: Status: ACTIVE | Noted: 2024-06-30

## 2024-06-30 LAB
ALBUMIN SERPL BCP-MCNC: 2.8 G/DL (ref 3.2–4.9)
ALBUMIN/GLOB SERPL: 0.8 G/DL
ALP SERPL-CCNC: 105 U/L (ref 30–99)
ALT SERPL-CCNC: 9 U/L (ref 2–50)
ANION GAP SERPL CALC-SCNC: 13 MMOL/L (ref 7–16)
AST SERPL-CCNC: 22 U/L (ref 12–45)
BILIRUB SERPL-MCNC: 0.7 MG/DL (ref 0.1–1.5)
BUN SERPL-MCNC: 7 MG/DL (ref 8–22)
CALCIUM ALBUM COR SERPL-MCNC: 9.2 MG/DL (ref 8.5–10.5)
CALCIUM SERPL-MCNC: 8.2 MG/DL (ref 8.5–10.5)
CHLORIDE SERPL-SCNC: 101 MMOL/L (ref 96–112)
CO2 SERPL-SCNC: 22 MMOL/L (ref 20–33)
CREAT SERPL-MCNC: 0.41 MG/DL (ref 0.5–1.4)
ERYTHROCYTE [DISTWIDTH] IN BLOOD BY AUTOMATED COUNT: 56.9 FL (ref 35.9–50)
GFR SERPLBLD CREATININE-BSD FMLA CKD-EPI: 105 ML/MIN/1.73 M 2
GLOBULIN SER CALC-MCNC: 3.4 G/DL (ref 1.9–3.5)
GLUCOSE BLD STRIP.AUTO-MCNC: 80 MG/DL (ref 65–99)
GLUCOSE SERPL-MCNC: 83 MG/DL (ref 65–99)
HCT VFR BLD AUTO: 35.5 % (ref 42–52)
HGB BLD-MCNC: 11.9 G/DL (ref 14–18)
MCH RBC QN AUTO: 30.4 PG (ref 27–33)
MCHC RBC AUTO-ENTMCNC: 33.5 G/DL (ref 32.3–36.5)
MCV RBC AUTO: 90.6 FL (ref 81.4–97.8)
PLATELET # BLD AUTO: 240 K/UL (ref 164–446)
PMV BLD AUTO: 10.5 FL (ref 9–12.9)
POTASSIUM SERPL-SCNC: 4.1 MMOL/L (ref 3.6–5.5)
PROT SERPL-MCNC: 6.2 G/DL (ref 6–8.2)
RBC # BLD AUTO: 3.92 M/UL (ref 4.7–6.1)
SODIUM SERPL-SCNC: 136 MMOL/L (ref 135–145)
WBC # BLD AUTO: 11.5 K/UL (ref 4.8–10.8)

## 2024-06-30 PROCEDURE — 80053 COMPREHEN METABOLIC PANEL: CPT

## 2024-06-30 PROCEDURE — A9270 NON-COVERED ITEM OR SERVICE: HCPCS

## 2024-06-30 PROCEDURE — A9270 NON-COVERED ITEM OR SERVICE: HCPCS | Performed by: NURSE PRACTITIONER

## 2024-06-30 PROCEDURE — 36415 COLL VENOUS BLD VENIPUNCTURE: CPT

## 2024-06-30 PROCEDURE — 700101 HCHG RX REV CODE 250

## 2024-06-30 PROCEDURE — 700102 HCHG RX REV CODE 250 W/ 637 OVERRIDE(OP): Performed by: NURSE PRACTITIONER

## 2024-06-30 PROCEDURE — 99232 SBSQ HOSP IP/OBS MODERATE 35: CPT | Mod: GC | Performed by: HOSPITALIST

## 2024-06-30 PROCEDURE — 770001 HCHG ROOM/CARE - MED/SURG/GYN PRIV*

## 2024-06-30 PROCEDURE — 85027 COMPLETE CBC AUTOMATED: CPT

## 2024-06-30 PROCEDURE — 700102 HCHG RX REV CODE 250 W/ 637 OVERRIDE(OP)

## 2024-06-30 PROCEDURE — 82962 GLUCOSE BLOOD TEST: CPT

## 2024-06-30 RX ORDER — POLYETHYLENE GLYCOL 3350 17 G/17G
1 POWDER, FOR SOLUTION ORAL 2 TIMES DAILY
Status: DISCONTINUED | OUTPATIENT
Start: 2024-06-30 | End: 2024-07-09 | Stop reason: HOSPADM

## 2024-06-30 RX ORDER — ACETAMINOPHEN 500 MG
1000 TABLET ORAL EVERY 6 HOURS PRN
Status: DISCONTINUED | OUTPATIENT
Start: 2024-06-30 | End: 2024-07-09 | Stop reason: HOSPADM

## 2024-06-30 RX ADMIN — SENNOSIDES AND DOCUSATE SODIUM 1 TABLET: 50; 8.6 TABLET ORAL at 19:46

## 2024-06-30 RX ADMIN — DOCUSATE SODIUM 100 MG: 100 CAPSULE, LIQUID FILLED ORAL at 04:37

## 2024-06-30 RX ADMIN — ATORVASTATIN CALCIUM 40 MG: 40 TABLET, FILM COATED ORAL at 17:13

## 2024-06-30 RX ADMIN — LEVOTHYROXINE SODIUM 100 MCG: 0.1 TABLET ORAL at 04:37

## 2024-06-30 RX ADMIN — AMLODIPINE BESYLATE 5 MG: 5 TABLET ORAL at 04:36

## 2024-06-30 RX ADMIN — POLYETHYLENE GLYCOL 3350 1 PACKET: 17 POWDER, FOR SOLUTION ORAL at 17:14

## 2024-06-30 RX ADMIN — LIDOCAINE 2 PATCH: 4 PATCH TOPICAL at 17:14

## 2024-06-30 RX ADMIN — ACETAMINOPHEN 1000 MG: 500 TABLET ORAL at 17:13

## 2024-06-30 RX ADMIN — Medication 1 APPLICATOR: at 04:37

## 2024-06-30 RX ADMIN — METOPROLOL SUCCINATE 25 MG: 25 TABLET, EXTENDED RELEASE ORAL at 04:37

## 2024-06-30 RX ADMIN — POLYETHYLENE GLYCOL 3350 1 PACKET: 17 POWDER, FOR SOLUTION ORAL at 04:37

## 2024-06-30 RX ADMIN — METOPROLOL SUCCINATE 25 MG: 25 TABLET, EXTENDED RELEASE ORAL at 17:13

## 2024-06-30 ASSESSMENT — PAIN DESCRIPTION - PAIN TYPE
TYPE: ACUTE PAIN;SURGICAL PAIN

## 2024-06-30 NOTE — CARE PLAN
The patient is Stable - Low risk of patient condition declining or worsening    Shift Goals  Clinical Goals: PO intake; mobility; BM  Patient Goals: comfort; wait for my wife  Family Goals: not present    Progress made toward(s) clinical / shift goals:    Problem: Pain - Standard  Goal: Alleviation of pain or a reduction in pain to the patient’s comfort goal  Outcome: Progressing     Problem: Knowledge Deficit - Standard  Goal: Patient and family/care givers will demonstrate understanding of plan of care, disease process/condition, diagnostic tests and medications  Outcome: Progressing     Problem: Fall Risk  Goal: Patient will remain free from falls  Outcome: Progressing     Problem: Skin Integrity  Goal: Skin integrity is maintained or improved  Outcome: Progressing     Problem: Urinary Elimination  Goal: Establish and maintain regular urinary output  Outcome: Progressing     Problem: Mobility  Goal: Patient's capacity to carry out activities will improve  Outcome: Progressing     Problem: Mobility  Goal: Patient's capacity to carry out activities will improve  Outcome: Progressing       Patient is not progressing towards the following goals:

## 2024-06-30 NOTE — CARE PLAN
The patient is Stable - Low risk of patient condition declining or worsening    Shift Goals  Clinical Goals: Safey, BM  Patient Goals: Comfort, rest  Family Goals: N/A    Progress made toward(s) clinical / shift goals:    Problem: Knowledge Deficit - Standard  Goal: Patient and family/care givers will demonstrate understanding of plan of care, disease process/condition, diagnostic tests and medications  Outcome: Progressing     Problem: Fall Risk  Goal: Patient will remain free from falls  Outcome: Progressing     Problem: Skin Integrity  Goal: Skin integrity is maintained or improved  Outcome: Progressing     Problem: Mobility  Goal: Patient's capacity to carry out activities will improve  Outcome: Progressing       Patient is not progressing towards the following goals:

## 2024-06-30 NOTE — ASSESSMENT & PLAN NOTE
"Nursing staff and wife report patient has poor PO intake. Patient reports he has \"moods and times when he does not want to eat\". He is amenable to trying nutrition shakes.   Plan:   - Ensure Vanilla shakes with each meal   - May need nutrition consult if intake is still poor 6/30/24  "

## 2024-06-30 NOTE — ASSESSMENT & PLAN NOTE
Patient complaining of urinary frequency. Could be UTI, BPH, or overactive bladder (this is per patient).   Plan:   - Will get UA. Consider started BPH medications versus medication for over active bladder

## 2024-06-30 NOTE — PROGRESS NOTES
[facility] FAMILY MEDICINE PROGRESS NOTE        Attending:   Bobbi Sanchez M.d.     Resident:   Ioana Méndez M.D. PGY-2     PATIENT:   Victoriano Vasquez; [id number]; 1940    ID:   84 y.o. male with past medical history of HLD, HTN, Hypothyroidism, was admitted on 6/24/2024 for L2 compression fracture, sent in by neurosurgery now s/p T12-L4 fusion.     SUBJECTIVE:   Nursing staff report concern over patient's minimal PO fluid or food intake. Utilized a  at bedside. Patient reports that he has been having urinary frequency so is trying to minimize fluid intake. Also does not want to eat at times. Is amendable to vanilla nutrition shakes. Patient and wife are agreeable to possible post acute placement.     OBJECTIVE:  Vitals:    06/29/24 1721 06/29/24 2240 06/30/24 0411 06/30/24 0434   BP: 134/59 122/59 115/54 119/52   Pulse:  78 70    Resp:  16 15    Temp:  36.7 °C (98.1 °F) 37.1 °C (98.8 °F)    TempSrc:  Temporal Temporal    SpO2:  96% 92%    Weight:       Height:         Intake/Output Summary (Last 24 hours) at 6/30/2024 0537  Last data filed at 6/29/2024 1117  Gross per 24 hour   Intake 360 ml   Output 20 ml   Net 340 ml     PHYSICAL EXAM:  General: No acute distress, afebrile, resting comfortably, elderly appearing   HEENT: NC/AT. EOMI.   Cardiovascular: RRR without murmurs. Normal capillary refill   Respiratory: CTAB  Abdomen: soft, nontender, nondistended, no masses  EXT:  TERRAZAS, no edema  Skin: No erythema/lesions   Spine: Dressing along with midline spine C/D/I  Neuro: Non-focal    LABS:  Recent Labs     06/27/24  0604 06/28/24  0615 06/29/24  0613   WBC 9.0 11.2* 10.9*   RBC 4.41* 3.63* 3.61*   HEMOGLOBIN 13.0* 11.2* 11.1*   HEMATOCRIT 39.2* 33.3* 33.9*   MCV 88.9 91.7 93.9   MCH 29.5 30.9 30.7   RDW 55.5* 57.6* 59.9*   PLATELETCT 306 249 233   MPV 10.1 10.1 10.0     Recent Labs     06/27/24  0604 06/28/24  0615 06/29/24  0613   SODIUM 135 136 134*   POTASSIUM 3.7 4.9 4.1   CHLORIDE 100  105 103   CO2 20 21 22   BUN 10 14 10   CREATININE 0.55 0.61 0.47*   CALCIUM 8.4* 7.4* 7.7*   ALBUMIN  --  2.6*  --      Estimated GFR/CRCL = Estimated Creatinine Clearance: 107.9 mL/min (A) (by C-G formula based on SCr of 0.47 mg/dL (L)).  Recent Labs     06/27/24  0604 06/28/24  0615 06/29/24  0613   GLUCOSE 86 113* 86     Recent Labs     06/28/24  0615   ASTSGOT 18   ALTSGPT 7   TBILIRUBIN 0.3   ALKPHOSPHAT 108*   GLOBULIN 3.3       IMAGING:  DX-PORTABLE FLUORO > 1 HOUR   Final Result      Portable fluoroscopy utilized for 7.4 seconds.         INTERPRETING LOCATION: [address] , [place], [address]      DX-THORACOLUMBAR SPINE-2 VIEWS   Final Result      Digitized intraoperative radiograph is submitted for review. This examination is not for diagnostic purpose but for guidance during a surgical procedure. Please see the patient's chart for full procedural details.         INTERPRETING LOCATION: [address] , [place], [address]      DX-O-ARM   Final Result      Portable O-arm utilized for 3.23 seconds.      INTERPRETING LOCATION: [address], [place], [address]      DX-CHEST-PORTABLE (1 VIEW)   Final Result      1.  Hypoinflation without other evidence for acute cardiopulmonary disease.   2.  Evidence of old granulomatous disease.      CT-LSPINE W/O PLUS RECONS   Final Result         1. Burst fracture of L2 is again noted with severe canal stenosis.          MEDS:  Current Facility-Administered Medications   Medication Last Admin    polyethylene glycol/lytes (Miralax) Packet 1 Packet 1 Packet at 06/30/24 0437    oxyCODONE immediate-release (Roxicodone) tablet 5 mg 5 mg at 06/29/24 1302    oxyCODONE immediate release (Roxicodone) tablet 10 mg      morphine 4 MG/ML injection 2 mg      Pharmacy Consult Request ...Pain Management Review 1 Each      MD ALERT...DO NOT ADMINISTER NSAIDS or ASPIRIN unless ORDERED By Neurosurgery 1 Each      docusate sodium (Colace) capsule 100 mg 100 mg at 06/30/24 0437    senna-docusate (Pericolace  Or Senokot S) 8.6-50 MG per tablet 1 Tablet 1 Tablet at 06/29/24 2020    senna-docusate (Pericolace Or Senokot S) 8.6-50 MG per tablet 1 Tablet 1 Tablet at 06/28/24 0900    polyethylene glycol/lytes (Miralax) Packet 1 Packet      magnesium hydroxide (Milk Of Magnesia) suspension 30 mL 30 mL at 06/29/24 1722    bisacodyl (Dulcolax) suppository 10 mg      sodium phosphate (Fleet) enema 133 mL      0.9 % NaCl with KCl 20 mEq infusion New Bag at 06/29/24 0245    diphenhydrAMINE (Benadryl) tablet/capsule 25 mg      Or    diphenhydrAMINE (Benadryl) injection 25 mg      ondansetron (Zofran) syringe/vial injection 4 mg      ondansetron (Zofran ODT) dispertab 4 mg      tizanidine (Zanaflex) tablet 2 mg      labetalol (Normodyne/Trandate) injection 10 mg      hydrALAZINE (Apresoline) injection 10 mg      Nozin nasal  swab 1 Applicator at 06/30/24 0437    lidocaine (Asperflex) 4 % patch 2 Patch 2 Patch at 06/29/24 1304    acetaminophen (Tylenol) tablet 650 mg      labetalol (Normodyne/Trandate) injection 10 mg      amLODIPine (Norvasc) tablet 5 mg 5 mg at 06/30/24 0436    atorvastatin (Lipitor) tablet 40 mg 40 mg at 06/29/24 1721    levothyroxine (Synthroid) tablet 100 mcg 100 mcg at 06/30/24 0437    metoprolol SR (Toprol XL) tablet 25 mg 25 mg at 06/30/24 0437     ASSESSMENT/PLAN:   84 y.o. male admitted for with past medical history of HLD, HTN, Hypothyroidism, was admitted on 6/24/2024 for L2 compression fracture, sent in by neurosurgery now s/p T12-L4 fusion on 6/27/24. POD3.     * Lumbar compression fracture, closed, initial encounter ([facility])- (present on admission)  Assessment & Plan  S/p T12-L4 fusion on 6/27 by neurosurgery. PCA pump Dc'd 6/28, pt not in sig pain. Hemovac removed 6/29.   Plan:   -  Tylenol 1000 mg q6h prn, oxycodone 5-10 mg q4h PRN, IV morphine 2 mg q3 hours prn, Tizanidine 2 mg TID PRN  - PT consulted recommend acute placement, pending PMR eval   - Neurosurgery recommendations: No DVT PPx, TLSO  "brace when patient is up and walking.   - Fall precautions  - Regular diet, advance as tolerated  - MiraLAX BID, senna-docusate daily, patient needs to have BM.    Poor nutrition  Assessment & Plan  Nursing staff and wife report patient has poor PO intake. Patient reports he has \"moods and times when he does not want to eat\". He is amenable to trying nutrition shakes.   Plan:   - Ensure Vanilla shakes with each meal   - May need nutrition consult if intake is still poor 6/30/24    Urinary frequency  Assessment & Plan  Patient complaining of urinary frequency. Could be UTI, BPH, or overactive bladder (this is per patient).   Plan:   - Will get UA. Consider started BPH medications versus medication for over active bladder     Delirium  Assessment & Plan  Patient A&Ox1 morning of 6/26, wife noticed shortly after admission that he was more confused.  Likely due to elderly patient in hospital setting. Delirium workup 6/26 essentially negative: No electrolyte abnormalities, A1c 5.5, glucose WNL, lactic acid WNL, ammonia WNL, CPK WNL, UA w/o infection.  Chest x-ray 6/26 showed evidence of old granulomatous disease with some hypoinflation but essentially negative. Patient had received 1 dose of oxycodone in past 24 hours on a.m. of 6/26, did not suspect this to be cause of delirium but held all opiates out of precaution prior to surgery without sig effect. Pt significantly improved am of 6/28.   Plan:   - Delirium precautions, keep patient awake during day time.Reoriented as needed. Pt needing pain medications due to surgery, otherwise minimize sedating medications.          Leukocytosis  Assessment & Plan  Developed a mild leukocytosis post op. Likely related to surgical procedure. Patient is otherwise asymptomatic.   Plan:   - Will continue to monitor WBC     Normocytic anemia  Assessment & Plan  Hgb 13.9 on admission. Post procedure stable at 11.0.   Plan:   - Continue to monitor H/H while inpatient     HLD " (hyperlipidemia)  Assessment & Plan  Lipid panel with LDL at 138 two months ago. Continue home atorvastatin 40 mg.     Primary hypertension- (present on admission)  Assessment & Plan  Continue home amlodipine 5mg and metoprolol SR 25 mg daily.     Hypothyroidism- (present on admission)  Assessment & Plan  TSH at 2 months ago of 2.480. Continue home levothyroxine 100 mcg.       Core Measures:  Fluids: None   Lines: PIV  Abx: None  Diet: Regular   PPX: SCDs/TEDs  DISPOSITION: Inpatient for post-op management and postacute placement pending  CODE STATUS: Full code    Ioana Méndez M.D. PGY-2  [facility]

## 2024-06-30 NOTE — PROGRESS NOTES
Neurosurgery Progress Note    Subjective:  No events overnight  Having some back pain, improving  Denies leg pain, feels strong but also fatigued    Exam:  Primarily Polish speaking  Alert, oriented to self and situation  LE str /   Inc c/d/I with dressing  Hvac site c/d/i    BP  Min: 115/54  Max: 134/59  Pulse  Av.3  Min: 68  Max: 78  Resp  Avg: 15.3  Min: 14  Max: 16  Temp  Av.9 °C (98.5 °F)  Min: 36.7 °C (98.1 °F)  Max: 37.1 °C (98.8 °F)  SpO2  Av.5 %  Min: 92 %  Max: 96 %    No data recorded    Recent Labs     24  0615 24  0613 24  0904   WBC 11.2* 10.9* 11.5*   RBC 3.63* 3.61* 3.92*   HEMOGLOBIN 11.2* 11.1* 11.9*   HEMATOCRIT 33.3* 33.9* 35.5*   MCV 91.7 93.9 90.6   MCH 30.9 30.7 30.4   MCHC 33.6 32.7 33.5   RDW 57.6* 59.9* 56.9*   PLATELETCT 249 233 240   MPV 10.1 10.0 10.5     Recent Labs     24  0615 24  0613 24  0904   SODIUM 136 134* 136   POTASSIUM 4.9 4.1 4.1   CHLORIDE 105 103 101   CO2 21 22 22   GLUCOSE 113* 86 83   BUN 14 10 7*   CREATININE 0.61 0.47* 0.41*   CALCIUM 7.4* 7.7* 8.2*                 Intake/Output                         24 - 24 0659 24 - 24 0659      Total  Total                 Intake    P.O.  360  -- 360  --  -- --    P.O. 360 -- 360 -- -- --    Total Intake 360 -- 360 -- -- --       Output    Urine  --  -- --  --  -- --    Number of Times Voided 1 x 1 x 2 x -- -- --    Drains  20  -- 20  --  -- --    Output (mL) ([REMOVED] Closed/Suction Drain 1 Inferior Back Hemovac 24 1312) 20 -- 20 -- -- --    Total Output 20 -- 20 -- -- --       Net I/O     340 -- 340 -- -- --            No intake or output data in the 24 hours ending 24 2645            polyethylene glycol/lytes  1 Packet DAILY    oxyCODONE immediate-release  5 mg Q4HRS PRN    oxyCODONE immediate-release  10 mg Q4HRS PRN    morphine injection  2 mg Q3HRS PRN    Pharmacy Consult Request  1 Each  PHARMACY TO DOSE    MD RALPH...DO NOT ADMINISTER NSAIDS or ASPIRIN unless ORDERED By Neurosurgery  1 Each PRN    docusate sodium  100 mg BID    senna-docusate  1 Tablet Nightly    senna-docusate  1 Tablet Q24HRS PRN    polyethylene glycol/lytes  1 Packet BID PRN    magnesium hydroxide  30 mL QDAY PRN    bisacodyl  10 mg Q24HRS PRN    sodium phosphate  1 Each Once PRN    diphenhydrAMINE  25 mg Q6HRS PRN    Or    diphenhydrAMINE  25 mg Q6HRS PRN    ondansetron  4 mg Q4HRS PRN    ondansetron  4 mg Q4HRS PRN    tizanidine  2 mg TID PRN    labetalol  10 mg Q HOUR PRN    hydrALAZINE  10 mg Q HOUR PRN    Nozin nasal  swab  1 Applicator BID    lidocaine  2 Patch Q24HR    acetaminophen  650 mg Q6HRS PRN    labetalol  10 mg Q4HRS PRN    amLODIPine  5 mg DAILY    atorvastatin  40 mg Q EVENING    levothyroxine  100 mcg AM ES    metoprolol SR  25 mg BID       Assessment and Plan:  Hospital day #5 L2 burst fx   POD #3 T12-L4 fusion   Prophylactic anticoagulation: no      PT/OT - TLSO when oob   Pain control - oxy, prn muscle relaxants  Bowel protocol  Will likely need placement, SNF referrals placed - [facility] primary

## 2024-06-30 NOTE — ASSESSMENT & PLAN NOTE
Developed a mild leukocytosis post op. Likely related to surgical procedure. Patient is otherwise asymptomatic.   Plan:   - Will continue to monitor WBC

## 2024-06-30 NOTE — CONSULTS
Physical Medicine and Rehabilitation Consultation          Chart Review     Date of initial consultation: 6/30/2024  Requesting provider: Bobbi Sanchez MD    Consulting provider: Anuradha Raya D.O.  Reason for consultation: assess for acute inpatient rehab appropriateness  LOS: 6 Day(s)    Chief complaint: back pain     HPI: The patient is a 84 y.o.  male with a past medical history of HTN, HLD, hypothyroidism and recent lumbar fracture ;  who presented on 6/24/2024  1:24 PM with acute worsening of back pain, causing patient to be unable to ambulate; patient's neurosurgery office instructed patient to go to the ED. Patient sustained a a GLF approx 2 months ago, did not cause any neurologic deficits, but pain acutely worse. Upon eval in the ED, CT L spine shoed a burst fracture of L2 with severe canal stenosis. Neurosurgery was consulted, and patient was taken to the OR on 6/27 for T12-L4 decompression and fusion performed by Dr. Christensen. Post op, patient has leukocytosis.   Patient has been able to tolerate therapy, he is CGA with mobility and transfers, he has not successfully completed stairs.     Social Hx:  Patient lives with spouse anadult children in a 2 story apartment with 9 PURA,    9 PURA  At prior level of function Mod I with FWW     Tobacco: Denied   Alcohol: Denied   Drugs: Denied     THERAPY:  Restrictions: Fall Risk, TLSO when OOB   PT: Functional mobility   6/28  CGA with FWW x 100 ft, CGA sit to stand, CGA transfers     OT: ADLs  6/28 CGA sit to satnd, CGA transfers, Max A lower body dressing, Max A upper body dressing     SLP:   None     IMAGING:  DX-THORACOLUMBAR SPINE-2 VIEWS  Narrative: 6/27/2024 7:46 AM    HISTORY/REASON FOR EXAM:  Main OR, thoracolumbar fusion.    TECHNIQUE/EXAM DESCRIPTION AND NUMBER OF VIEWS:  Thoracolumbar spine, 2 views.  Digitized Intraoperative Radiograph    FINDINGS:    Fluoroscopic time: 7.4 seconds.    Fluoroscopy dose(DAP): 0.405 Gy*cm^2  Impression: Digitized  intraoperative radiograph is submitted for review. This examination is not for diagnostic purpose but for guidance during a surgical procedure. Please see the patient's chart for full procedural details.    INTERPRETING LOCATION: [address] [place] [address] [place], [address]  DX-PORTABLE FLUORO > 1 HOUR  Narrative: 6/27/2024 7:46 AM    HISTORY/REASON FOR EXAM:  Main OR, thoracolumbar fusion.    TECHNIQUE/EXAM DESCRIPTION AND NUMBER OF VIEWS:  Portable fluoroscopy for greater than one hour in OR.    FINDINGS:  The portable fluoroscopy unit was obligated to the procedure for greater than one hour. Actual fluoro time was 7.4 seconds.    Fluoroscopy dose(DAP): 0.405 Gy*cm^2  Impression: Portable fluoroscopy utilized for 7.4 seconds.    INTERPRETING LOCATION: [address] [place] [address] [place], [address]  DX-O-ARM  Narrative: 6/27/2024 7:46 AM    HISTORY/REASON FOR EXAM:  Thoracolumbar fusion.    TECHNIQUE/EXAM DESCRIPTION AND NUMBER OF VIEWS:  Portable O-arm    FINDINGS:  The O-arm unit was provided for intraoperative guidance in the OR for less than one hour. Actual fluoro time was 3.23 seconds.    Fluoroscopy dose(DAP): 928.83 Gy*cm^2  Impression: Portable O-arm utilized for 3.23 seconds.    INTERPRETING LOCATION: [address] [place] [address] [place], [address]        PROCEDURES:  6/27 T12-L4 decompression and fusion performed by Dr. Christensen     PMH:  History reviewed. No pertinent past medical history.    PSH:  Past Surgical History:   Procedure Laterality Date    THORACIC FUSION O-ARM  6/27/2024    Procedure: FUSION, SPINE, THORACIC, POSTERIOR APPROACH, WITH O-ARM IMAGING GUIDANCE - T12-L4 WITH INSTRUMENTATION;  Surgeon: Ebenezer Christensen M.D.;  Location: SURGERY [facility];  Service: Neurosurgery    THORACIC LAMINECTOMY  6/27/2024    Procedure: LAMINECTOMY, SPINE, THORACIC, L1-L2;  Surgeon: Ebenezer Christensen M.D.;  Location: [facility];  Service: Neurosurgery    GUSTAVO BY LAPAROSCOPY N/A 4/28/2024    Procedure: CHOLECYSTECTOMY, LAPAROSCOPIC;  Surgeon:  Tomas Rodrigues M.D.;  Location: SURGERY [facility];  Service: General    CHOLANGIOGRAMS N/A 4/28/2024    Procedure: ATTEMPTED INTRA-OPERATIVE CHOLANGIOGRAM;  Surgeon: Tomas Rodrigues M.D.;  Location: SURGERY [facility];  Service: General       FHX:  History reviewed. No pertinent family history.    Medications:  Current Facility-Administered Medications   Medication Dose    acetaminophen (Tylenol) tablet 1,000 mg  1,000 mg    polyethylene glycol/lytes (Miralax) Packet 1 Packet  1 Packet    oxyCODONE immediate-release (Roxicodone) tablet 5 mg  5 mg    oxyCODONE immediate release (Roxicodone) tablet 10 mg  10 mg    morphine 4 MG/ML injection 2 mg  2 mg    Pharmacy Consult Request ...Pain Management Review 1 Each  1 Each    MD ALERT...DO NOT ADMINISTER NSAIDS or ASPIRIN unless ORDERED By Neurosurgery 1 Each  1 Each    senna-docusate (Pericolace Or Senokot S) 8.6-50 MG per tablet 1 Tablet  1 Tablet    senna-docusate (Pericolace Or Senokot S) 8.6-50 MG per tablet 1 Tablet  1 Tablet    polyethylene glycol/lytes (Miralax) Packet 1 Packet  1 Packet    magnesium hydroxide (Milk Of Magnesia) suspension 30 mL  30 mL    bisacodyl (Dulcolax) suppository 10 mg  10 mg    sodium phosphate (Fleet) enema 133 mL  1 Each    diphenhydrAMINE (Benadryl) tablet/capsule 25 mg  25 mg    Or    diphenhydrAMINE (Benadryl) injection 25 mg  25 mg    ondansetron (Zofran) syringe/vial injection 4 mg  4 mg    ondansetron (Zofran ODT) dispertab 4 mg  4 mg    tizanidine (Zanaflex) tablet 2 mg  2 mg    labetalol (Normodyne/Trandate) injection 10 mg  10 mg    hydrALAZINE (Apresoline) injection 10 mg  10 mg    Nozin nasal  swab  1 Applicator    lidocaine (Asperflex) 4 % patch 2 Patch  2 Patch    labetalol (Normodyne/Trandate) injection 10 mg  10 mg    amLODIPine (Norvasc) tablet 5 mg  5 mg    atorvastatin (Lipitor) tablet 40 mg  40 mg    levothyroxine (Synthroid) tablet 100 mcg  100 mcg    metoprolol SR (Toprol XL) tablet 25 mg  25  "mg       Allergies:  No Known Allergies      Physical Exam:  Vitals: /57   Pulse 75   Temp 36.7 °C (98.1 °F) (Temporal)   Resp 14   Ht 1.626 m (5' 4\")   Wt 80.3 kg (177 lb)   SpO2 97%     Labs: Reviewed and significant for   Recent Labs     06/28/24 0615 06/29/24  0613 06/30/24  0904   RBC 3.63* 3.61* 3.92*   HEMOGLOBIN 11.2* 11.1* 11.9*   HEMATOCRIT 33.3* 33.9* 35.5*   PLATELETCT 249 233 240     Recent Labs     06/28/24  0615 06/29/24  0613 06/30/24  0904   SODIUM 136 134* 136   POTASSIUM 4.9 4.1 4.1   CHLORIDE 105 103 101   CO2 21 22 22   GLUCOSE 113* 86 83   BUN 14 10 7*   CREATININE 0.61 0.47* 0.41*   CALCIUM 7.4* 7.7* 8.2*     Recent Results (from the past 24 hour(s))   Comp Metabolic Panel    Collection Time: 06/30/24  9:04 AM   Result Value Ref Range    Sodium 136 135 - 145 mmol/L    Potassium 4.1 3.6 - 5.5 mmol/L    Chloride 101 96 - 112 mmol/L    Co2 22 20 - 33 mmol/L    Anion Gap 13.0 7.0 - 16.0    Glucose 83 65 - 99 mg/dL    Bun 7 (L) 8 - 22 mg/dL    Creatinine 0.41 (L) 0.50 - 1.40 mg/dL    Calcium 8.2 (L) 8.5 - 10.5 mg/dL    Correct Calcium 9.2 8.5 - 10.5 mg/dL    AST(SGOT) 22 12 - 45 U/L    ALT(SGPT) 9 2 - 50 U/L    Alkaline Phosphatase 105 (H) 30 - 99 U/L    Total Bilirubin 0.7 0.1 - 1.5 mg/dL    Albumin 2.8 (L) 3.2 - 4.9 g/dL    Total Protein 6.2 6.0 - 8.2 g/dL    Globulin 3.4 1.9 - 3.5 g/dL    A-G Ratio 0.8 g/dL   CBC WITHOUT DIFFERENTIAL    Collection Time: 06/30/24  9:04 AM   Result Value Ref Range    WBC 11.5 (H) 4.8 - 10.8 K/uL    RBC 3.92 (L) 4.70 - 6.10 M/uL    Hemoglobin 11.9 (L) 14.0 - 18.0 g/dL    Hematocrit 35.5 (L) 42.0 - 52.0 %    MCV 90.6 81.4 - 97.8 fL    MCH 30.4 27.0 - 33.0 pg    MCHC 33.5 32.3 - 36.5 g/dL    RDW 56.9 (H) 35.9 - 50.0 fL    Platelet Count 240 164 - 446 K/uL    MPV 10.5 9.0 - 12.9 fL   ESTIMATED GFR    Collection Time: 06/30/24  9:04 AM   Result Value Ref Range    GFR (CKD-EPI) 105 >60 mL/min/1.73 m 2   POCT glucose device results    Collection Time: " 06/30/24 10:33 AM   Result Value Ref Range    POC Glucose, Blood 80 65 - 99 mg/dL         ASSESSMENT:  Patient is a 84 y.o. 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    Barriers to transfer include: Insurance authorization, TCCs to verify disposition, medical clearance and bed availability     Assessment and Plan:  L2 burst fracture   - sustained in a GLF approx 2 months ago, worth worsening pain   - CT L spine shoed a burst fracture of L2 severe canal stenosis   - neurosurgery consulted   - 6/27 T12- L4 fusion performed by Dr. Christensen   - TLSO when OOB   - continue with PT/OT     Leukocytosis   - post op elevated WBC   - 6/30 WBC 11.5 ,uptrending   - not on steroids   - primary team monitoring for signs of infeciton     HTN   - on home dose amlodipine and metoprolol     Hypothyroidism   - on home dose synthroid       DISPO:  - patient is currently functioning below their level of baseline, recommend post acute rehab  - recommend IRF level therapy with 3hr of therapy 5 days per week , but is not a canidate for rehab at [facility] due to [employer] insurance   - recommend sending referrals to [facility]   - PM&R will sign off       Medical Complexity:  L2 burst fracture   Leukocytosis   HTN   Hypothyroidism   Impaired mobility and ADLs       DVT PPX: SCDs, not cleared for chemical dvt ppx by neurosurgery       Thank you for allowing us to participate in the care of this patient.     Anuradha Raya D.O.   Physical Medicine and Rehabilitation     Please note that this dictation was created using voice recognition software. I have made every reasonable attempt to correct obvious errors, but there may be errors of grammar and possibly content that I did not discover before finalizing the note.             note.

## 2024-07-01 PROBLEM — R41.0 DELIRIUM: Status: RESOLVED | Noted: 2024-06-26 | Resolved: 2024-07-01

## 2024-07-01 LAB
ANION GAP SERPL CALC-SCNC: 13 MMOL/L (ref 7–16)
APPEARANCE UR: CLEAR
BACTERIA #/AREA URNS HPF: NEGATIVE /HPF
BILIRUB UR QL STRIP.AUTO: NEGATIVE
BUN SERPL-MCNC: 12 MG/DL (ref 8–22)
CALCIUM SERPL-MCNC: 7.8 MG/DL (ref 8.5–10.5)
CHLORIDE SERPL-SCNC: 99 MMOL/L (ref 96–112)
CO2 SERPL-SCNC: 22 MMOL/L (ref 20–33)
COLOR UR: YELLOW
CREAT SERPL-MCNC: 0.46 MG/DL (ref 0.5–1.4)
EPI CELLS #/AREA URNS HPF: ABNORMAL /HPF
ERYTHROCYTE [DISTWIDTH] IN BLOOD BY AUTOMATED COUNT: 57.1 FL (ref 35.9–50)
GFR SERPLBLD CREATININE-BSD FMLA CKD-EPI: 102 ML/MIN/1.73 M 2
GLUCOSE SERPL-MCNC: 99 MG/DL (ref 65–99)
GLUCOSE UR STRIP.AUTO-MCNC: NEGATIVE MG/DL
HCT VFR BLD AUTO: 32.4 % (ref 42–52)
HGB BLD-MCNC: 10.9 G/DL (ref 14–18)
HYALINE CASTS #/AREA URNS LPF: ABNORMAL /LPF
KETONES UR STRIP.AUTO-MCNC: 40 MG/DL
LEUKOCYTE ESTERASE UR QL STRIP.AUTO: ABNORMAL
MCH RBC QN AUTO: 30.6 PG (ref 27–33)
MCHC RBC AUTO-ENTMCNC: 33.6 G/DL (ref 32.3–36.5)
MCV RBC AUTO: 91 FL (ref 81.4–97.8)
MICRO URNS: ABNORMAL
NITRITE UR QL STRIP.AUTO: NEGATIVE
PH UR STRIP.AUTO: 8 [PH] (ref 5–8)
PLATELET # BLD AUTO: 233 K/UL (ref 164–446)
PMV BLD AUTO: 10.4 FL (ref 9–12.9)
POTASSIUM SERPL-SCNC: 3.5 MMOL/L (ref 3.6–5.5)
PROT UR QL STRIP: 30 MG/DL
RBC # BLD AUTO: 3.56 M/UL (ref 4.7–6.1)
RBC # URNS HPF: ABNORMAL /HPF
RBC UR QL AUTO: ABNORMAL
SODIUM SERPL-SCNC: 134 MMOL/L (ref 135–145)
SP GR UR STRIP.AUTO: 1.02
UROBILINOGEN UR STRIP.AUTO-MCNC: 0.2 MG/DL
WBC # BLD AUTO: 11.3 K/UL (ref 4.8–10.8)
WBC #/AREA URNS HPF: ABNORMAL /HPF

## 2024-07-01 PROCEDURE — 80048 BASIC METABOLIC PNL TOTAL CA: CPT

## 2024-07-01 PROCEDURE — 99232 SBSQ HOSP IP/OBS MODERATE 35: CPT | Mod: GC | Performed by: FAMILY MEDICINE

## 2024-07-01 PROCEDURE — 700102 HCHG RX REV CODE 250 W/ 637 OVERRIDE(OP)

## 2024-07-01 PROCEDURE — 97530 THERAPEUTIC ACTIVITIES: CPT

## 2024-07-01 PROCEDURE — A9270 NON-COVERED ITEM OR SERVICE: HCPCS

## 2024-07-01 PROCEDURE — 700102 HCHG RX REV CODE 250 W/ 637 OVERRIDE(OP): Mod: JZ

## 2024-07-01 PROCEDURE — 770001 HCHG ROOM/CARE - MED/SURG/GYN PRIV*

## 2024-07-01 PROCEDURE — 36415 COLL VENOUS BLD VENIPUNCTURE: CPT

## 2024-07-01 PROCEDURE — A9270 NON-COVERED ITEM OR SERVICE: HCPCS | Performed by: NURSE PRACTITIONER

## 2024-07-01 PROCEDURE — 81001 URINALYSIS AUTO W/SCOPE: CPT

## 2024-07-01 PROCEDURE — 700102 HCHG RX REV CODE 250 W/ 637 OVERRIDE(OP): Performed by: NURSE PRACTITIONER

## 2024-07-01 PROCEDURE — 85027 COMPLETE CBC AUTOMATED: CPT

## 2024-07-01 PROCEDURE — 97116 GAIT TRAINING THERAPY: CPT

## 2024-07-01 PROCEDURE — A9270 NON-COVERED ITEM OR SERVICE: HCPCS | Mod: JZ

## 2024-07-01 RX ORDER — ENOXAPARIN SODIUM 100 MG/ML
40 INJECTION SUBCUTANEOUS DAILY
Status: DISCONTINUED | OUTPATIENT
Start: 2024-07-01 | End: 2024-07-01

## 2024-07-01 RX ORDER — POTASSIUM CHLORIDE 20 MEQ/1
40 TABLET, EXTENDED RELEASE ORAL ONCE
Status: COMPLETED | OUTPATIENT
Start: 2024-07-01 | End: 2024-07-01

## 2024-07-01 RX ADMIN — LEVOTHYROXINE SODIUM 100 MCG: 0.1 TABLET ORAL at 04:31

## 2024-07-01 RX ADMIN — ATORVASTATIN CALCIUM 40 MG: 40 TABLET, FILM COATED ORAL at 16:41

## 2024-07-01 RX ADMIN — SENNOSIDES AND DOCUSATE SODIUM 1 TABLET: 50; 8.6 TABLET ORAL at 20:43

## 2024-07-01 RX ADMIN — POLYETHYLENE GLYCOL 3350 1 PACKET: 17 POWDER, FOR SOLUTION ORAL at 04:30

## 2024-07-01 RX ADMIN — MAGNESIUM HYDROXIDE 30 ML: 1200 LIQUID ORAL at 04:30

## 2024-07-01 RX ADMIN — POTASSIUM CHLORIDE 40 MEQ: 1500 TABLET, EXTENDED RELEASE ORAL at 10:28

## 2024-07-01 RX ADMIN — POLYETHYLENE GLYCOL 3350 1 PACKET: 17 POWDER, FOR SOLUTION ORAL at 16:40

## 2024-07-01 RX ADMIN — METOPROLOL SUCCINATE 25 MG: 25 TABLET, EXTENDED RELEASE ORAL at 16:41

## 2024-07-01 RX ADMIN — METOPROLOL SUCCINATE 25 MG: 25 TABLET, EXTENDED RELEASE ORAL at 04:31

## 2024-07-01 RX ADMIN — ACETAMINOPHEN 1000 MG: 500 TABLET ORAL at 16:41

## 2024-07-01 RX ADMIN — AMLODIPINE BESYLATE 5 MG: 5 TABLET ORAL at 04:31

## 2024-07-01 ASSESSMENT — PAIN DESCRIPTION - PAIN TYPE
TYPE: ACUTE PAIN;SURGICAL PAIN
TYPE: ACUTE PAIN;SURGICAL PAIN
TYPE: SURGICAL PAIN
TYPE: ACUTE PAIN;SURGICAL PAIN
TYPE: ACUTE PAIN;SURGICAL PAIN

## 2024-07-01 ASSESSMENT — COGNITIVE AND FUNCTIONAL STATUS - GENERAL
CLIMB 3 TO 5 STEPS WITH RAILING: A LITTLE
TURNING FROM BACK TO SIDE WHILE IN FLAT BAD: A LITTLE
SUGGESTED CMS G CODE MODIFIER MOBILITY: CK
MOVING FROM LYING ON BACK TO SITTING ON SIDE OF FLAT BED: A LITTLE
MOBILITY SCORE: 18
STANDING UP FROM CHAIR USING ARMS: A LITTLE
MOVING TO AND FROM BED TO CHAIR: A LITTLE
WALKING IN HOSPITAL ROOM: A LITTLE

## 2024-07-01 ASSESSMENT — GAIT ASSESSMENTS
GAIT LEVEL OF ASSIST: CONTACT GUARD ASSIST
DISTANCE (FEET): 150
ASSISTIVE DEVICE: FRONT WHEEL WALKER
DEVIATION: BRADYKINETIC

## 2024-07-01 NOTE — DISCHARGE PLANNING
PM&R consult done. Dr. Raya recommending  - patient is currently functioning below their level of baseline, recommend post acute rehab  - recommend IRF level therapy with 3hr of therapy 5 days per week , but is not a canidate for [facility] at [facility] due to [employer] insurance   - recommend sending referrals to [facility]   - PM&R will sign off     TCC not following

## 2024-07-01 NOTE — CARE PLAN
The patient is Stable - Low risk of patient condition declining or worsening    Shift Goals  Clinical Goals: Mobility, BM, safety  Patient Goals: Comfort, rest  Family Goals: N/A    Progress made toward(s) clinical / shift goals:    Problem: Knowledge Deficit - Standard  Goal: Patient and family/care givers will demonstrate understanding of plan of care, disease process/condition, diagnostic tests and medications  Outcome: Progressing     Problem: Fall Risk  Goal: Patient will remain free from falls  Outcome: Progressing     Problem: Urinary Elimination  Goal: Establish and maintain regular urinary output  Outcome: Progressing     Problem: Mobility  Goal: Patient's capacity to carry out activities will improve  Outcome: Progressing       Patient is not progressing towards the following goals:

## 2024-07-01 NOTE — PROGRESS NOTES
Neurosurgery Progress Note    Subjective:  No events   Pain tolerabel     Exam:  Primarily Nepali speaking  Awake, alert  LE str 5/5   Inc c/d/I with dressing  Hvac out     BP  Min: 98/48  Max: 117/57  Pulse  Av.8  Min: 70  Max: 75  Resp  Avg: 15.5  Min: 14  Max: 16  Temp  Av.6 °C (97.8 °F)  Min: 36.3 °C (97.3 °F)  Max: 36.7 °C (98.1 °F)  SpO2  Av.8 %  Min: 91 %  Max: 97 %    No data recorded    Recent Labs     24  0613 24  0904 24  0000   WBC 10.9* 11.5* 11.3*   RBC 3.61* 3.92* 3.56*   HEMOGLOBIN 11.1* 11.9* 10.9*   HEMATOCRIT 33.9* 35.5* 32.4*   MCV 93.9 90.6 91.0   MCH 30.7 30.4 30.6   MCHC 32.7 33.5 33.6   RDW 59.9* 56.9* 57.1*   PLATELETCT 233 240 233   MPV 10.0 10.5 10.4     Recent Labs     24  0624  0904 24  0000   SODIUM 134* 136 134*   POTASSIUM 4.1 4.1 3.5*   CHLORIDE 103 101 99   CO2 22 22 22   GLUCOSE 86 83 99   BUN 10 7* 12   CREATININE 0.47* 0.41* 0.46*   CALCIUM 7.7* 8.2* 7.8*                 Intake/Output                         24 - 24 - 24 Total  Total                 Intake    Total Intake -- -- -- -- -- --       Output    Urine  --  400 400  --  -- --    Number of Times Voided -- 1 x 1 x -- -- --    Urine Void (mL) -- 400 400 -- -- --    Total Output -- 400 400 -- -- --       Net I/O     -- -400 -400 -- -- --              Intake/Output Summary (Last 24 hours) at 2024 0820  Last data filed at 2024 0205  Gross per 24 hour   Intake --   Output 400 ml   Net -400 ml               acetaminophen  1,000 mg Q6HRS PRN    polyethylene glycol/lytes  1 Packet BID    oxyCODONE immediate-release  5 mg Q4HRS PRN    oxyCODONE immediate-release  10 mg Q4HRS PRN    morphine injection  2 mg Q3HRS PRN    Pharmacy Consult Request  1 Each PHARMACY TO DOSE    MD ALERT...DO NOT ADMINISTER NSAIDS or ASPIRIN unless ORDERED By Neurosurgery  1 Each PRN    senna-docusate  1  Tablet Nightly    senna-docusate  1 Tablet Q24HRS PRN    polyethylene glycol/lytes  1 Packet BID PRN    magnesium hydroxide  30 mL QDAY PRN    bisacodyl  10 mg Q24HRS PRN    sodium phosphate  1 Each Once PRN    diphenhydrAMINE  25 mg Q6HRS PRN    Or    diphenhydrAMINE  25 mg Q6HRS PRN    ondansetron  4 mg Q4HRS PRN    ondansetron  4 mg Q4HRS PRN    tizanidine  2 mg TID PRN    labetalol  10 mg Q HOUR PRN    hydrALAZINE  10 mg Q HOUR PRN    lidocaine  2 Patch Q24HR    labetalol  10 mg Q4HRS PRN    amLODIPine  5 mg DAILY    atorvastatin  40 mg Q EVENING    levothyroxine  100 mcg AM ES    metoprolol SR  25 mg BID       Assessment and Plan:  Hospital day #6 L2 burst fx   POD #4 T12-L4 fusion   Prophylactic anticoagulation: yes, okay to start 7/2/24     PT/OT - TLSO when oob   Pain control - oxy, prn muscle relaxants  Bowel protocol  Will likely need placement, SNF referrals placed - [facility] primary   Okay from our standpoint to transfer any time  Will f/u in 2 weeks from surgery for staple removal

## 2024-07-01 NOTE — DISCHARGE PLANNING
Care Transition Team Assessment  PCP: Dr. Javy Wren MD  LMSW met with pt at bedside to complete assessment. Pt A&Ox3 and able to verify the information on the face sheet. Pt lives with his spouse and son in a two-story condo at [address] [place] [address], [place] [address] that has stairs in the home. Prior to this hospitalization, pt reports being independent at home with ADLs and IADLs. Pt uses a FWW and a cane as needed. Pt reports his spouse and son as good support for him. Pt is retired and receives SSI monthly deposits. Pt denies any substance use or mental health concerns. Pt denies having an advance directive and refused AD packet at this time. Pt confirmed that he has [employer] insurance. Pt has been recommended for post-acute placement, pt is agreeable to placement and states it will help him get better. LMSW attempted to obtain choice, pt requested LMSW to write down accepting facilities to research and return for choice at a later time.  Information Source  Orientation Level: Oriented to person, Oriented to situation, Oriented to place, Disoriented to time  Information Given By: Patient  Informant's Name: Victoriano  Who is responsible for making decisions for patient? : Patient    Readmission Evaluation  Is this a readmission?: No    Elopement Risk  Legal Hold: No  Ambulatory or Self Mobile in Wheelchair: Yes  Disoriented: No  Psychiatric Symptoms: None  History of Wandering: No  Elopement this Admit: No  Vocalizing Wanting to Leave: No  Displays Behaviors, Body Language Wanting to Leave: No-Not at Risk for Elopement  Elopement Risk: Not at Risk for Elopement  Wanderguard On: Unavailable  Personal Belongings: Hospital Clothing Only  Environmental Precautions: Sharp or Dangerous Items Removed    Interdisciplinary Discharge Planning  Lives with - Patient's Self Care Capacity: Spouse, Adult Children  Patient or legal guardian wants to designate a caregiver: No  Support Systems: Family Member(s), Friends /  Neighbors  Housing / Facility: 2 Story Apartment / Condo  Prior Services: Home-Independent    Discharge Preparedness  What is your plan after discharge?: Skilled nursing facility  What are your discharge supports?: Spouse  Prior Functional Level: Ambulatory, Drives Self, Independent with Activities of Daily Living, Independent with Medication Management, Uses Cane, Uses Walker  Difficulity with ADLs: Walking  Difficulity with IADLs: None    Functional Assesment  Prior Functional Level: Ambulatory, Drives Self, Independent with Activities of Daily Living, Independent with Medication Management, Uses Cane, Uses Walker    Finances  Financial Barriers to Discharge: No  Prescription Coverage: Yes    Vision / Hearing Impairment  Vision Impairment : No  Right Eye Vision: Wears Glasses  Left Eye Vision: Wears Glasses  Hearing Impairment : No    Advance Directive  Advance Directive?: None  Advance Directive offered?: AD Booklet refused    Domestic Abuse  Have you ever been the victim of abuse or violence?: No  Possible Abuse/Neglect Reported to:: Not Applicable    Psychological Assessment  History of Substance Abuse: None  History of Psychiatric Problems: No  Non-compliant with Treatment: No  Newly Diagnosed Illness: No    Discharge Risks or Barriers  Discharge risks or barriers?: No    Anticipated Discharge Information  Discharge Disposition: D/T to SNF with Medicare cert in anticipation of skilled care (03)

## 2024-07-01 NOTE — PROGRESS NOTES
FAMILY MEDICINE PROGRESS NOTE          PATIENT ID:  NAME:  Victoriano Vasquez  MRN:               [id number]  YOB: 1940    Date of Admission: 6/24/2024     Attending: Jada Jade M.d.     Resident: Ramez Richardson M.D. (PGY-2)    Primary Care Physician:  Susi Gregorio M.D.    HPI: Victoriano Vasquez is a 84 y.o. male with past medical history of HLD, HTN, Hypothyroidism, was admitted on 6/24/2024 for L2 compression fracture, sent in by neurosurgery now s/p T12-L4 fusion. On hospital day 7.    Interval Problem Update  6/30: Seen by PM&R, agree with rehab placement. Pt insurance not accepted by [facility]. Recommend referrals to [facility].    SUBJECTIVE:   No acute events overnight, patient agreeable to plans for rehab placement.  Agrees that he feels he needs more strengthening before he goes home.  He states he has pain only when trying to ambulate, but has still been able to ambulate some throughout the day yesterday.  Pain controlled with Tylenol only.    With regards to urinary frequency noted on 6/30, he states he has been urinating normally.    OBJECTIVE:  Temp:  [36.6 °C (97.9 °F)-36.7 °C (98.1 °F)] 36.6 °C (97.9 °F)  Pulse:  [70-75] 70  Resp:  [14-16] 16  BP: ()/(48-57) 117/57  SpO2:  [91 %-97 %] 91 %      Intake/Output Summary (Last 24 hours) at 7/1/2024 0750  Last data filed at 7/1/2024 0205  Gross per 24 hour   Intake --   Output 400 ml   Net -400 ml       PHYSICAL EXAM:  Physical Exam  Vitals reviewed.   Constitutional:       General: He is not in acute distress.     Appearance: Normal appearance. He is not toxic-appearing.      Comments: Very pleasant elderly male lying in hospital bed.   used during interaction via telephone   HENT:      Head: Normocephalic and atraumatic.      Right Ear: External ear normal.      Left Ear: External ear normal.      Nose: Nose normal. No congestion.      Mouth/Throat:      Mouth: Mucous membranes are moist.      Pharynx: Oropharynx  is clear.   Eyes:      Extraocular Movements: Extraocular movements intact.      Pupils: Pupils are equal, round, and reactive to light.   Cardiovascular:      Rate and Rhythm: Normal rate and regular rhythm.      Heart sounds: No murmur heard.  Pulmonary:      Effort: Pulmonary effort is normal. No respiratory distress.      Breath sounds: Normal breath sounds.   Abdominal:      General: Abdomen is flat. Bowel sounds are normal. There is no distension.      Palpations: Abdomen is soft.      Tenderness: There is no abdominal tenderness. There is no guarding or rebound.   Musculoskeletal:         General: No swelling or deformity. Normal range of motion.      Cervical back: Normal range of motion and neck supple.   Skin:     General: Skin is warm and dry.      Capillary Refill: Capillary refill takes less than 2 seconds.      Findings: No rash.   Neurological:      General: No focal deficit present.      Mental Status: He is alert and oriented to person, place, and time.      Cranial Nerves: No cranial nerve deficit.   Psychiatric:         Mood and Affect: Mood normal.         Behavior: Behavior normal.           LABS:  Recent Labs     06/29/24  0613 06/30/24  0904 07/01/24  0000   WBC 10.9* 11.5* 11.3*   RBC 3.61* 3.92* 3.56*   HEMOGLOBIN 11.1* 11.9* 10.9*   HEMATOCRIT 33.9* 35.5* 32.4*   MCV 93.9 90.6 91.0   MCH 30.7 30.4 30.6   RDW 59.9* 56.9* 57.1*   PLATELETCT 233 240 233   MPV 10.0 10.5 10.4     Recent Labs     06/29/24  0613 06/30/24  0904 07/01/24  0000   SODIUM 134* 136 134*   POTASSIUM 4.1 4.1 3.5*   CHLORIDE 103 101 99   CO2 22 22 22   BUN 10 7* 12   CREATININE 0.47* 0.41* 0.46*   CALCIUM 7.7* 8.2* 7.8*   ALBUMIN  --  2.8*  --      Estimated GFR/CRCL = Estimated Creatinine Clearance: 110.2 mL/min (A) (by C-G formula based on SCr of 0.46 mg/dL (L)).  Recent Labs     06/29/24  0613 06/30/24  0904 07/01/24  0000   GLUCOSE 86 83 99     Recent Labs     06/30/24  0904   ASTSGOT 22   ALTSGPT 9   TBILIRUBIN 0.7  "  ALKPHOSPHAT 105*   GLOBULIN 3.4             No results for input(s): \"INR\", \"APTT\", \"DDIMER\", \"FIBRINOGEN\" in the last 72 hours.      IMAGING:  DX-PORTABLE FLUORO > 1 HOUR   Final Result      Portable fluoroscopy utilized for 7.4 seconds.         INTERPRETING LOCATION: [address] [facility], [place], [address]      DX-THORACOLUMBAR SPINE-2 VIEWS   Final Result      Digitized intraoperative radiograph is submitted for review. This examination is not for diagnostic purpose but for guidance during a surgical procedure. Please see the patient's chart for full procedural details.         INTERPRETING LOCATION: [address] [facility], [place], [address]      DX-O-ARM   Final Result      Portable O-arm utilized for 3.23 seconds.      INTERPRETING LOCATION: [address] [facility], [place], [address]      DX-CHEST-PORTABLE (1 VIEW)   Final Result      1.  Hypoinflation without other evidence for acute cardiopulmonary disease.   2.  Evidence of old granulomatous disease.      CT-LSPINE W/O PLUS RECONS   Final Result         1. Burst fracture of L2 is again noted with severe canal stenosis.          CULTURES:   Results       Procedure Component Value Units Date/Time    URINALYSIS [[id number]]     Order Status: No result Specimen: Urine     URINALYSIS CULTURE, IF INDICATED [[id number]]  (Abnormal) Collected: 06/27/24 1405    Order Status: Completed Specimen: Urine, Clean Catch Updated: 06/27/24 2215     Color Yellow     Character Clear     Specific Gravity 1.025     Ph 5.0     Glucose Negative mg/dL      Ketones 15 mg/dL      Protein Negative mg/dL      Bilirubin Negative     Urobilinogen, Urine 0.2     Nitrite Negative     Leukocyte Esterase Negative     Occult Blood Trace     Micro Urine Req Microscopic    URINALYSIS CULTURE, IF INDICATED [[id number]]     Order Status: Canceled Specimen: Urine, Clean Catch             MEDS:  Current Facility-Administered Medications   Medication Last Admin    acetaminophen (Tylenol) tablet 1,000 mg 1,000 mg at 06/30/24 1713    " polyethylene glycol/lytes (Miralax) Packet 1 Packet 1 Packet at 07/01/24 0430    oxyCODONE immediate-release (Roxicodone) tablet 5 mg 5 mg at 06/29/24 1302    oxyCODONE immediate release (Roxicodone) tablet 10 mg      morphine 4 MG/ML injection 2 mg      Pharmacy Consult Request ...Pain Management Review 1 Each      MD ALERT...DO NOT ADMINISTER NSAIDS or ASPIRIN unless ORDERED By Neurosurgery 1 Each      senna-docusate (Pericolace Or Senokot S) 8.6-50 MG per tablet 1 Tablet 1 Tablet at 06/30/24 1946    senna-docusate (Pericolace Or Senokot S) 8.6-50 MG per tablet 1 Tablet 1 Tablet at 06/28/24 0900    polyethylene glycol/lytes (Miralax) Packet 1 Packet 1 Packet at 06/30/24 1714    magnesium hydroxide (Milk Of Magnesia) suspension 30 mL 30 mL at 07/01/24 0430    bisacodyl (Dulcolax) suppository 10 mg      sodium phosphate (Fleet) enema 133 mL      diphenhydrAMINE (Benadryl) tablet/capsule 25 mg      Or    diphenhydrAMINE (Benadryl) injection 25 mg      ondansetron (Zofran) syringe/vial injection 4 mg      ondansetron (Zofran ODT) dispertab 4 mg      tizanidine (Zanaflex) tablet 2 mg      labetalol (Normodyne/Trandate) injection 10 mg      hydrALAZINE (Apresoline) injection 10 mg      lidocaine (Asperflex) 4 % patch 2 Patch 2 Patch at 06/30/24 1714    labetalol (Normodyne/Trandate) injection 10 mg      amLODIPine (Norvasc) tablet 5 mg 5 mg at 07/01/24 0431    atorvastatin (Lipitor) tablet 40 mg 40 mg at 06/30/24 1713    levothyroxine (Synthroid) tablet 100 mcg 100 mcg at 07/01/24 0431    metoprolol SR (Toprol XL) tablet 25 mg 25 mg at 07/01/24 0431       ASSESSMENT/PLAN:  84 y.o. male admitted for   * Lumbar compression fracture, closed, initial encounter (HCC)- (present on admission)  Assessment & Plan  S/p T12-L4 fusion on 6/27 by neurosurgery. PCA pump Dc'd 6/28, pt not in sig pain. Hemovac removed 6/29.   Plan:   -  Tylenol 1000 mg q6h prn, oxycodone 5-10 mg q4h PRN, IV morphine 2 mg q3 hours prn, Tizanidine 2 mg  "TID PRN  - PT consulted recommend acute placement, PM&R saw patient and agreed with plan for rehab.  Patient's insurance not excepted by [facility], recommend referral to [facility].  Case management working on placement  - Neurosurgery recommendations: No DVT PPx, TLSO brace when patient is up and walking.   - Fall precautions  - Regular diet, advance as tolerated  - MiraLAX BID, senna-docusate daily, patient needs to have BM.    Leukocytosis  Assessment & Plan  Developed a mild leukocytosis post op. Likely related to surgical procedure. Patient is otherwise asymptomatic.   - WBC trending to normal. Ok to stop trending 7/1    Poor nutrition- (present on admission)  Assessment & Plan  Nursing staff and wife report patient has poor PO intake. Patient reports he has \"moods and times when he does not want to eat\". He is amenable to trying nutrition shakes.   Plan:   - Ensure Vanilla shakes with each meal   - May need nutrition consult if intake is still poor 6/30/24    Urinary frequency  Assessment & Plan  Patient complaining of urinary frequency. Could be UTI, BPH, or overactive bladder (this is per patient).   Plan:   - UA 7/1 shows no signs of nicole infection.  Patient denying urinary symptoms.  Will hold off on treatment for now, continue to monitor    Normocytic anemia  Assessment & Plan  Hgb 13.9 on admission. Post procedure stable at 11.0.   - Hgb stable at 10.9 7/1, ok to stop daily trending    HLD (hyperlipidemia)- (present on admission)  Assessment & Plan  Lipid panel with LDL at 138 two months ago.   -Continue home atorvastatin 40 mg.     Primary hypertension- (present on admission)  Assessment & Plan  Continue home amlodipine 5mg and metoprolol SR 25 mg daily.     Hypothyroidism- (present on admission)  Assessment & Plan  TSH at 2 months ago of 2.480.   -Continue home levothyroxine 100 mcg.          Core Measures:  Fluids: IV push only, no IV fluids   Lines: Peripheral IV for intravenous access  Abx: None  Diet: " regular diet   PPX: pharmacologic prophylaxis contraindicated due to recent surgery, per neurosurgery recs    CODE Status: Full Code      Disposition  Patient is medically cleared pending SNF/rehab placement  for discharge.   Anticipate discharge to skilled nursing facility.  I have placed the appropriate orders for post-discharge needs.    I have personally seen and examined the patient at bedside. I discussed the plan of care with patient, pharmacy, and  Jada Jade M.d..      Ramez Richardson M.D.   PGY-2  [facility]

## 2024-07-02 PROCEDURE — 99232 SBSQ HOSP IP/OBS MODERATE 35: CPT | Mod: GC | Performed by: FAMILY MEDICINE

## 2024-07-02 PROCEDURE — 97535 SELF CARE MNGMENT TRAINING: CPT

## 2024-07-02 PROCEDURE — 700101 HCHG RX REV CODE 250

## 2024-07-02 PROCEDURE — 700102 HCHG RX REV CODE 250 W/ 637 OVERRIDE(OP)

## 2024-07-02 PROCEDURE — A9270 NON-COVERED ITEM OR SERVICE: HCPCS

## 2024-07-02 PROCEDURE — 770001 HCHG ROOM/CARE - MED/SURG/GYN PRIV*

## 2024-07-02 PROCEDURE — 700102 HCHG RX REV CODE 250 W/ 637 OVERRIDE(OP): Performed by: NURSE PRACTITIONER

## 2024-07-02 PROCEDURE — A9270 NON-COVERED ITEM OR SERVICE: HCPCS | Performed by: NURSE PRACTITIONER

## 2024-07-02 PROCEDURE — 97530 THERAPEUTIC ACTIVITIES: CPT | Mod: CQ

## 2024-07-02 RX ADMIN — METOPROLOL SUCCINATE 25 MG: 25 TABLET, EXTENDED RELEASE ORAL at 04:51

## 2024-07-02 RX ADMIN — OXYCODONE HYDROCHLORIDE 5 MG: 5 TABLET ORAL at 10:43

## 2024-07-02 RX ADMIN — LIDOCAINE 2 PATCH: 4 PATCH TOPICAL at 11:46

## 2024-07-02 RX ADMIN — OXYCODONE HYDROCHLORIDE 5 MG: 5 TABLET ORAL at 17:27

## 2024-07-02 RX ADMIN — POLYETHYLENE GLYCOL 3350 1 PACKET: 17 POWDER, FOR SOLUTION ORAL at 04:51

## 2024-07-02 RX ADMIN — POLYETHYLENE GLYCOL 3350 1 PACKET: 17 POWDER, FOR SOLUTION ORAL at 17:26

## 2024-07-02 RX ADMIN — ATORVASTATIN CALCIUM 40 MG: 40 TABLET, FILM COATED ORAL at 17:27

## 2024-07-02 RX ADMIN — ACETAMINOPHEN 1000 MG: 500 TABLET ORAL at 08:19

## 2024-07-02 RX ADMIN — AMLODIPINE BESYLATE 5 MG: 5 TABLET ORAL at 04:51

## 2024-07-02 RX ADMIN — SENNOSIDES AND DOCUSATE SODIUM 1 TABLET: 50; 8.6 TABLET ORAL at 20:57

## 2024-07-02 RX ADMIN — LEVOTHYROXINE SODIUM 100 MCG: 0.1 TABLET ORAL at 04:51

## 2024-07-02 ASSESSMENT — PAIN DESCRIPTION - PAIN TYPE
TYPE: ACUTE PAIN;SURGICAL PAIN
TYPE: SURGICAL PAIN

## 2024-07-02 ASSESSMENT — COGNITIVE AND FUNCTIONAL STATUS - GENERAL
SUGGESTED CMS G CODE MODIFIER DAILY ACTIVITY: CK
STANDING UP FROM CHAIR USING ARMS: A LITTLE
MOVING FROM LYING ON BACK TO SITTING ON SIDE OF FLAT BED: A LITTLE
MOBILITY SCORE: 18
SUGGESTED CMS G CODE MODIFIER MOBILITY: CK
CLIMB 3 TO 5 STEPS WITH RAILING: A LITTLE
TURNING FROM BACK TO SIDE WHILE IN FLAT BAD: A LITTLE
WALKING IN HOSPITAL ROOM: A LITTLE
EATING MEALS: A LITTLE
TOILETING: A LITTLE
DRESSING REGULAR UPPER BODY CLOTHING: A LOT
DRESSING REGULAR LOWER BODY CLOTHING: A LOT
MOVING TO AND FROM BED TO CHAIR: A LITTLE
HELP NEEDED FOR BATHING: A LOT
PERSONAL GROOMING: A LITTLE
DAILY ACTIVITIY SCORE: 15

## 2024-07-02 ASSESSMENT — GAIT ASSESSMENTS
DISTANCE (FEET): 12
GAIT LEVEL OF ASSIST: CONTACT GUARD ASSIST
ASSISTIVE DEVICE: FRONT WHEEL WALKER
DEVIATION: BRADYKINETIC

## 2024-07-02 NOTE — PROGRESS NOTES
FAMILY MEDICINE PROGRESS NOTE          PATIENT ID:  NAME:  Victoriano Vasquez  MRN:               [id number]  YOB: 1940    Date of Admission: 6/24/2024     Attending: Jada Jade M.d.     Resident: Ramez Richardson M.D. (PGY-2)    Primary Care Physician:  Susi Gregorio M.D.    HPI: Victoriano Vasquez is a 84 y.o. male with past medical history of HLD, HTN, Hypothyroidism, was admitted on 6/24/2024 for L2 compression fracture, sent in by neurosurgery now s/p T12-L4 fusion. On hospital day 8.    Interval Problem Update  6/30: Seen by PM&R, agree with rehab placement. Pt insurance not accepted by [facility]. Recommend referrals to [facility].  7/1: Medically cleared, awaiting placement.     SUBJECTIVE:   No acute events overnight, patient states he has been trying to get up and ambulate but still has increased pain with ambulation.  RN staff state the patient was at bedside chair for a few minutes earlier today, and it significantly increased his pain.  They held the patient back into bed with some difficulty, with improvement in pain.  Patient and family in agreement with plan for postacute placement.    OBJECTIVE:  Temp:  [36.7 °C (98 °F)-36.9 °C (98.4 °F)] 36.8 °C (98.2 °F)  Pulse:  [69-73] 73  Resp:  [16] 16  BP: (109-121)/(50-61) 110/61  SpO2:  [93 %-95 %] 93 %    No intake or output data in the 24 hours ending 07/02/24 1654      PHYSICAL EXAM:  Physical Exam  Vitals reviewed.   Constitutional:       General: He is not in acute distress.     Appearance: Normal appearance. He is not toxic-appearing.      Comments: Very pleasant elderly male lying in hospital bed.  Wife at bedside.  In person  present for interaction   HENT:      Head: Normocephalic and atraumatic.      Right Ear: External ear normal.      Left Ear: External ear normal.      Nose: Nose normal. No congestion.      Mouth/Throat:      Mouth: Mucous membranes are moist.      Pharynx: Oropharynx is clear.   Eyes:       "Extraocular Movements: Extraocular movements intact.      Pupils: Pupils are equal, round, and reactive to light.   Cardiovascular:      Rate and Rhythm: Normal rate and regular rhythm.      Heart sounds: No murmur heard.  Pulmonary:      Effort: Pulmonary effort is normal. No respiratory distress.      Breath sounds: Normal breath sounds.   Abdominal:      General: Abdomen is flat. Bowel sounds are normal. There is no distension.      Palpations: Abdomen is soft.      Tenderness: There is no abdominal tenderness. There is no guarding or rebound.   Musculoskeletal:         General: No swelling or deformity. Normal range of motion.      Cervical back: Normal range of motion and neck supple.   Skin:     General: Skin is warm and dry.      Capillary Refill: Capillary refill takes less than 2 seconds.      Findings: No rash.   Neurological:      General: No focal deficit present.      Mental Status: He is alert and oriented to person, place, and time.      Cranial Nerves: No cranial nerve deficit.   Psychiatric:         Mood and Affect: Mood normal.         Behavior: Behavior normal.           LABS:  Recent Labs     06/30/24  0904 07/01/24  0000   WBC 11.5* 11.3*   RBC 3.92* 3.56*   HEMOGLOBIN 11.9* 10.9*   HEMATOCRIT 35.5* 32.4*   MCV 90.6 91.0   MCH 30.4 30.6   RDW 56.9* 57.1*   PLATELETCT 240 233   MPV 10.5 10.4     Recent Labs     06/30/24  0904 07/01/24  0000   SODIUM 136 134*   POTASSIUM 4.1 3.5*   CHLORIDE 101 99   CO2 22 22   BUN 7* 12   CREATININE 0.41* 0.46*   CALCIUM 8.2* 7.8*   ALBUMIN 2.8*  --      Estimated GFR/CRCL = Estimated Creatinine Clearance: 110.2 mL/min (A) (by C-G formula based on SCr of 0.46 mg/dL (L)).  Recent Labs     06/30/24  0904 07/01/24  0000   GLUCOSE 83 99     Recent Labs     06/30/24  0904   ASTSGOT 22   ALTSGPT 9   TBILIRUBIN 0.7   ALKPHOSPHAT 105*   GLOBULIN 3.4             No results for input(s): \"INR\", \"APTT\", \"DDIMER\", \"FIBRINOGEN\" in the last 72 hours.      IMAGING:  DX-PORTABLE " FLUORO > 1 HOUR   Final Result      Portable fluoroscopy utilized for 7.4 seconds.         INTERPRETING LOCATION: [address], [place], [address]      DX-THORACOLUMBAR SPINE-2 VIEWS   Final Result      Digitized intraoperative radiograph is submitted for review. This examination is not for diagnostic purpose but for guidance during a surgical procedure. Please see the patient's chart for full procedural details.         INTERPRETING LOCATION: [address], [place], [address]      DX-O-ARM   Final Result      Portable O-arm utilized for 3.23 seconds.      INTERPRETING LOCATION: [address], [place], [address]      DX-CHEST-PORTABLE (1 VIEW)   Final Result      1.  Hypoinflation without other evidence for acute cardiopulmonary disease.   2.  Evidence of old granulomatous disease.      CT-LSPINE W/O PLUS RECONS   Final Result         1. Burst fracture of L2 is again noted with severe canal stenosis.          CULTURES:   Results       Procedure Component Value Units Date/Time    URINALYSIS [[id number]]  (Abnormal) Collected: 07/01/24 1055    Order Status: Completed Specimen: Urine, Clean Catch Updated: 07/01/24 1355     Color Yellow     Character Clear     Specific Gravity 1.019     Ph 8.0     Glucose Negative mg/dL      Ketones 40 mg/dL      Protein 30 mg/dL      Bilirubin Negative     Urobilinogen, Urine 0.2     Nitrite Negative     Leukocyte Esterase Trace     Occult Blood Trace     Micro Urine Req Microscopic    URINALYSIS CULTURE, IF INDICATED [[id number]]  (Abnormal) Collected: 06/27/24 1405    Order Status: Completed Specimen: Urine, Clean Catch Updated: 06/27/24 2215     Color Yellow     Character Clear     Specific Gravity 1.025     Ph 5.0     Glucose Negative mg/dL      Ketones 15 mg/dL      Protein Negative mg/dL      Bilirubin Negative     Urobilinogen, Urine 0.2     Nitrite Negative     Leukocyte Esterase Negative     Occult Blood Trace     Micro Urine Req Microscopic            MEDS:  Current Facility-Administered  Medications   Medication Last Admin    acetaminophen (Tylenol) tablet 1,000 mg 1,000 mg at 07/02/24 0819    polyethylene glycol/lytes (Miralax) Packet 1 Packet 1 Packet at 07/02/24 0451    oxyCODONE immediate-release (Roxicodone) tablet 5 mg 5 mg at 07/02/24 1043    oxyCODONE immediate release (Roxicodone) tablet 10 mg      morphine 4 MG/ML injection 2 mg      Pharmacy Consult Request ...Pain Management Review 1 Each      MD ALERT...DO NOT ADMINISTER NSAIDS or ASPIRIN unless ORDERED By Neurosurgery 1 Each      senna-docusate (Pericolace Or Senokot S) 8.6-50 MG per tablet 1 Tablet 1 Tablet at 07/01/24 2043    senna-docusate (Pericolace Or Senokot S) 8.6-50 MG per tablet 1 Tablet 1 Tablet at 06/28/24 0900    polyethylene glycol/lytes (Miralax) Packet 1 Packet 1 Packet at 06/30/24 1714    magnesium hydroxide (Milk Of Magnesia) suspension 30 mL 30 mL at 07/01/24 0430    bisacodyl (Dulcolax) suppository 10 mg      sodium phosphate (Fleet) enema 133 mL      diphenhydrAMINE (Benadryl) tablet/capsule 25 mg      Or    diphenhydrAMINE (Benadryl) injection 25 mg      ondansetron (Zofran) syringe/vial injection 4 mg      ondansetron (Zofran ODT) dispertab 4 mg      tizanidine (Zanaflex) tablet 2 mg      labetalol (Normodyne/Trandate) injection 10 mg      hydrALAZINE (Apresoline) injection 10 mg      lidocaine (Asperflex) 4 % patch 2 Patch 2 Patch at 07/02/24 1146    labetalol (Normodyne/Trandate) injection 10 mg      amLODIPine (Norvasc) tablet 5 mg 5 mg at 07/02/24 0451    atorvastatin (Lipitor) tablet 40 mg 40 mg at 07/01/24 1641    levothyroxine (Synthroid) tablet 100 mcg 100 mcg at 07/02/24 0451    metoprolol SR (Toprol XL) tablet 25 mg 25 mg at 07/02/24 0451       ASSESSMENT/PLAN:  84 y.o. male admitted for   * Lumbar compression fracture, closed, initial encounter (HCC)- (present on admission)  Assessment & Plan  S/p T12-L4 fusion on 6/27 by neurosurgery. PCA pump Dc'd 6/28, pt not in sig pain. Hemovac removed 6/29.  "  Plan:   -  Tylenol 1000 mg q6h prn, oxycodone 5-10 mg q4h PRN, IV morphine 2 mg q3 hours prn, Tizanidine 2 mg TID PRN  - PT consulted recommend acute placement, PM&R saw patient and agreed with plan for rehab.  Patient's insurance not excepted by [facility], recommend referral to [facility]  Case management working on placement   -7/2 referral sent to SNF, awaiting insurance Auth  - Neurosurgery recommendations: No DVT PPx, TLSO brace when patient is up and walking.   - Fall precautions  - Regular diet, advance as tolerated  - MiraLAX BID, senna-docusate daily, patient needs to have BM.    Leukocytosis  Assessment & Plan  Developed a mild leukocytosis post op. Likely related to surgical procedure. Patient is otherwise asymptomatic.   - WBC trending to normal. Ok to stop trending 7/1    Poor nutrition- (present on admission)  Assessment & Plan  Nursing staff and wife report patient has poor PO intake. Patient reports he has \"moods and times when he does not want to eat\". He is amenable to trying nutrition shakes.   Plan:   - Ensure Vanilla shakes with each meal   -7/2 wife reports patient still has decreased p.o. intake.  May need further nutritional support after discharge    Urinary frequency  Assessment & Plan  Patient complaining of urinary frequency. Could be UTI, BPH, or overactive bladder (this is per patient).   Plan:   - UA 7/1 shows no signs of nicole infection.  Patient denying urinary symptoms.  Will hold off on treatment for now, continue to monitor    Normocytic anemia  Assessment & Plan  Hgb 13.9 on admission. Post procedure stable at 11.0.   - Hgb stable at 10.9 7/1, ok to stop daily trending    HLD (hyperlipidemia)- (present on admission)  Assessment & Plan  Lipid panel with LDL at 138 two months ago.   -Continue home atorvastatin 40 mg.     Primary hypertension- (present on admission)  Assessment & Plan  Continue home amlodipine 5mg and metoprolol SR 25 mg daily.     Hypothyroidism- (present on " admission)  Assessment & Plan  TSH at 2 months ago of 2.480.   -Continue home levothyroxine 100 mcg.          Core Measures:  Fluids: IV push only, no IV fluids   Lines: Peripheral IV for intravenous access  Abx: None  Diet: regular diet   PPX: pharmacologic prophylaxis contraindicated due to recent surgery, per neurosurgery recs    CODE Status: Full Code      Disposition  Patient is medically cleared pending SNF/rehab placement  for discharge.   Anticipate discharge to skilled nursing facility.  I have placed the appropriate orders for post-discharge needs.    I have personally seen and examined the patient at bedside. I discussed the plan of care with patient, family, bedside RN, , and  Jada Jade M.d..      Ramez Richardson M.D.   PGY-2  [facility]

## 2024-07-02 NOTE — THERAPY
"Occupational Therapy  Daily Treatment     Patient Name: Victoriano Vasquez  Age:  84 y.o., Sex:  male  Medical Record #: [id number]  Today's Date: 7/2/2024     Precautions  Precautions: (P) Fall Risk, Spinal / Back Precautions , TLSO (Thoracolumbosacral orthosis)  Comments: (P) TLSO EOB when OOB    Assessment    Pt was seen today for OT tx with wife at bedside. Pt demo'd Mod A to don underwear, Max A w/ wife assistance to don/doff TLSO, and SBA for toileting. Pt demonstrated poor carryover of spinal precautions and compensatory strategies for ADL participation. He continues to be limited by decreased ADL participation, strength, activity tolerance, and carryover of post-op precautions. Will continue to follow while in house.     Plan    Treatment Plan Status: (P) Continue Current Treatment Plan  Type of Treatment: Self Care / Activities of Daily Living, Adaptive Equipment, Therapeutic Exercises, Therapeutic Activity, Family / Caregiver Training, Neuro Re-Education / Balance  Treatment Frequency: 4 Times per Week  Treatment Duration: Until Therapy Goals Met    DC Equipment Recommendations: (P) Unable to determine at this time  Discharge Recommendations: (P) Recommend post-acute placement for additional occupational therapy services prior to discharge home    Subjective    \"The  is doing a great job, it's just hard to divide my attention.\" (In Iranian)      Objective       07/02/24 1623    Services   Is patient using  services for this encounter? Yes   Language Interpreted Tajik    Name Arpan [id number]     Mode iPad   Content of Interpretation (select all) History/Visit information;Patient Education   Precautions   Precautions Fall Risk;Spinal / Back Precautions ;TLSO (Thoracolumbosacral orthosis)   Comments TLSO EOB when OOB   Vitals   O2 Delivery Device None - Room Air   Pain 0 - 10 Group   Therapist Pain Assessment Nurse Notified  (No complaints of pain during session, " pt agreeable to OT tx)   Cognition    Level of Consciousness Alert   Safety Awareness Impaired  (Demos poor insight regarding home safety and caregiver assistance)   Attention Impaired   Sequencing Impaired   Comments Pleasant and cooperative, demonstrated poor carryover of spinal precautions   Active ROM Upper Body   Comments WFL   Strength Upper Body   Comments Generalized weakness   Balance   Sitting Balance (Static) Fair   Sitting Balance (Dynamic) Fair   Standing Balance (Static) Fair   Standing Balance (Dynamic) Fair -   Weight Shift Sitting Fair   Weight Shift Standing Fair   Skilled Intervention Verbal Cuing;Tactile Cuing   Comments w/ FWW   Bed Mobility    Supine to Sit Minimal Assist   Sit to Supine Minimal Assist   Scooting Standby Assist   Rolling Standby Assist   Skilled Intervention Compensatory Strategies;Tactile Cuing;Verbal Cuing;Sequencing   Comments Pt demo'd poor carryover of logroll, required reinforcement.   Activities of Daily Living   Upper Body Dressing Maximal Assist  (Ed. wife on donning/doffing TLSO, letty'd understanding.)   Lower Body Dressing Moderate Assist  (Donned underwear)   Toileting Standby Assist  (Simulated toileting - did not void in toilet)   Skilled Intervention Compensatory Strategies;Sequencing;Tactile Cuing;Verbal Cuing   How much help from another person does the patient currently need...   Putting on and taking off regular lower body clothing? 2   Bathing (including washing, rinsing, and drying)? 2   Toileting, which includes using a toilet, bedpan, or urinal? 3   Putting on and taking off regular upper body clothing? 2   Taking care of personal grooming such as brushing teeth? 3   Eating meals? 3   6 Clicks Daily Activity Score 15   Functional Mobility   Sit to Stand Standby Assist   Bed, Chair, Wheelchair Transfer Standby Assist   Toilet Transfers Standby Assist   Mobility w/i room and bathroom w/ FWW   Skilled Intervention Compensatory Strategies;Tactile Cuing;Verbal  Cuing   Activity Tolerance   Sitting in Chair NT - pt in bed pre-/post-session   Sitting Edge of Bed 6 minutes   Standing 7 minutes   Patient / Family Goals   Patient / Family Goal #1 to go home   Goal #1 Outcome Progressing slower than expected   Short Term Goals   Short Term Goal # 1 Pt will complete toilet/ADL txf with SPV   Goal Outcome # 1 Progressing as expected   Short Term Goal # 2 Pt will complete toileting with SPV   Goal Outcome # 2 Progressing as expected   Short Term Goal # 3 Pt will complete LB dressing with SBA and AE PRN   Goal Outcome # 3 Progressing as expected   Short Term Goal # 4 Caregiver will demo ability to don/doff TLSO brace independently   Goal Outcome # 4 Progressing as expected   Education Group   Education Provided Spinal Precautions;Transfers;Role of Occupational Therapist;Activities of Daily Living;Use of Call Light   Role of Occupational Therapist Patient Response Patient;Acceptance;Explanation;Verbal Demonstration;Family   Spinal Precautions Patient Response Patient;Family;Acceptance;Explanation;Demonstration;Verbal Demonstration;Action Demonstration;Reinforcement Needed   Brace Wear & Care Patient Response Patient;Acceptance;Explanation;Demonstration;Verbal Demonstration;Action Demonstration;Family;Reinforcement Needed   Transfers Patient Response Patient;Acceptance;Explanation;Demonstration;Verbal Demonstration;Action Demonstration;Reinforcement Needed   ADL Patient Response Patient;Acceptance;Explanation;Demonstration;Verbal Demonstration;Action Demonstration;Reinforcement Needed   Use of Call Light Patient Response Patient;Acceptance;Explanation;Action Demonstration   Occupational Therapy Treatment Plan    O.T. Treatment Plan Continue Current Treatment Plan   Anticipated Discharge Equipment and Recommendations   DC Equipment Recommendations Unable to determine at this time   Discharge Recommendations Recommend post-acute placement for additional occupational therapy services  prior to discharge home   Interdisciplinary Plan of Care Collaboration   IDT Collaboration with  Nursing   Patient Position at End of Therapy In Bed;Call Light within Reach;Tray Table within Reach;Family / Friend in Room   Collaboration Comments RN aware of session and outcome   Session Information   Date / Session Number  7/2, 2 (2/4, 7/4)

## 2024-07-02 NOTE — THERAPY
Physical Therapy   Daily Treatment     Patient Name: Victoriano Vasquez  Age:  84 y.o., Sex:  male  Medical Record #: [id number]  Today's Date: 7/1/2024     Precautions  Precautions: Fall Risk;Spinal / Back Precautions ;TLSO (Thoracolumbosacral orthosis)  Comments: TLSO when OOB    Assessment    Pt seen for PT tx session. Attempted to get more home setup info via in-person interpretor, however pt appears confused and gave conflicting answers vs info gathered at [facility]. Pt reported that he lives with his wife and 2 sons who all work, unclear whether pt would be home alone. Pt also reported that he lives in [place].    Pt continues to move relatively well requiring only CGA to walk 150 ft and navigate 5 stairs. Pt did have difficulty sequencing doffing TLSO and log roll despite max cues. Remains limited by impaired cog with poor carryover of spinal prx edu, impaired motor sequencing, impaired balance, and decreased endurance. Will follow.    Plan    Treatment Plan Status: Continue Current Treatment Plan  Type of Treatment: Bed Mobility, Equipment, Gait Training, Neuro Re-Education / Balance, Self Care / Home Evaluation, Stair Training, Therapeutic Activities, Therapeutic Exercise  Treatment Frequency: 5 Times per Week  Treatment Duration: Until Therapy Goals Met    DC Equipment Recommendations: Unable to determine at this time  Discharge Recommendations: Recommend post-acute placement for additional physical therapy services prior to discharge home (If family is comfortable providing 24/7 standby assist initially, may be able to return home with HHPT)      Subjective    Pt received resting in bed, agreeable to participate.      Objective       07/01/24 4647    Services   Is patient using  services for this encounter? Yes   Language Interpreted Persian    Name Dayna    Mode Inhouse    Refusal signed by patient? No   Content of Interpretation (select all) Other  (PT tx  session)   Precautions   Precautions Fall Risk;Spinal / Back Precautions ;TLSO (Thoracolumbosacral orthosis)   Comments TLSO when OOB   Vitals   O2 Delivery Device None - Room Air   Pain 0 - 10 Group   Therapist Pain Assessment During Activity;Nurse Notified  (c/o mild back pain with mobility)   Cognition    Cognition / Consciousness X   Level of Consciousness Alert   New Learning Impaired   Attention Impaired   Sequencing Impaired   Comments pleasant and cooperative, appears confused often responding to the Estonian interpretor in English and requiring multiple repetitions/clarifications by interpretor to answer questions appropriately, ie stated that he lives in [place] when asked where he is now   Balance   Sitting Balance (Static) Fair   Sitting Balance (Dynamic) Fair   Standing Balance (Static) Fair   Standing Balance (Dynamic) Fair -   Weight Shift Sitting Fair   Weight Shift Standing Fair   Skilled Intervention Compensatory Strategies;Tactile Cuing;Verbal Cuing   Comments FWW   Bed Mobility    Supine to Sit Minimal Assist   Sit to Supine Contact Guard Assist   Scooting Standby Assist   Rolling Contact Guard Assist   Skilled Intervention Compensatory Strategies;Sequencing;Tactile Cuing;Verbal Cuing   Comments HOBE slightly, difficulty sequencing log roll requiring max cues   Gait Analysis   Gait Level Of Assist Contact Guard Assist   Assistive Device Front Wheel Walker   Distance (Feet) 150   # of Times Distance was Traveled 1   Deviation Bradykinetic   # of Stairs Climbed 10  (step to pattern)   Level of Assist with Stairs Contact Guard Assist   Skilled Intervention Compensatory Strategies;Sequencing;Tactile Cuing;Verbal Cuing   Functional Mobility   Sit to Stand Contact Guard Assist   Bed, Chair, Wheelchair Transfer Contact Guard Assist   Transfer Method Stand Step   Mobility bed>up in hallway FWW>stairs>bed   Skilled Intervention Compensatory Strategies;Sequencing;Tactile Cuing;Verbal Cuing   6 Clicks  Assessment - How much HELP from from another person do you currently need... (If the patient hasn't done an activity recently, how much help from another person do you think he/she would need if he/she tried?)   Turning from your back to your side while in a flat bed without using bedrails? 3   Moving from lying on your back to sitting on the side of a flat bed without using bedrails? 3   Moving to and from a bed to a chair (including a wheelchair)? 3   Standing up from a chair using your arms (e.g., wheelchair, or bedside chair)? 3   Walking in hospital room? 3   Climbing 3-5 steps with a railing? 3   6 clicks Mobility Score 18   Short Term Goals    Short Term Goal # 1 Pt will transfer with FWW and supervision to progress function in 6 visits.   Goal Outcome # 1 Progressing as expected   Short Term Goal # 2 Pt will ambulate 200ft with FWW and supervision to progress function in 6 visits.   Goal Outcome # 2 Progressing as expected   Short Term Goal # 3 Pt will negotiate 5+ stairs with min A for home entry in 6 visits.   Goal Outcome # 3 Goal met, new goal added   Short Term Goal # 3 B Pt will negotiate 5+ stairs with SPV for home entry in 6 visits.   Physical Therapy Treatment Plan   Physical Therapy Treatment Plan Continue Current Treatment Plan   Anticipated Discharge Equipment and Recommendations   DC Equipment Recommendations Unable to determine at this time   Discharge Recommendations Recommend post-acute placement for additional physical therapy services prior to discharge home  (If family is comfortable providing 24/7 standby assist initially, may be able to return home with HHPT)   Interdisciplinary Plan of Care Collaboration   IDT Collaboration with  Nursing   Patient Position at End of Therapy In Bed;Bed Alarm On;Call Light within Reach;Tray Table within Reach;Phone within Reach   Collaboration Comments RN updated   Session Information   Date / Session Number  7/1-2 (2/5, 7/4)

## 2024-07-02 NOTE — CARE PLAN
Problem: Pain - Standard  Goal: Alleviation of pain or a reduction in pain to the patient’s comfort goal  Outcome: Progressing   Pt educated on medications available for pain control. Pt medicated per MAR. Pt educated on non-pharmacological pain interventions.   Problem: Fall Risk  Goal: Patient will remain free from falls  Outcome: Progressing  Fall precautions in place. Pt educated to call for assistance prior to getting out of bed.    The patient is Stable - Low risk of patient condition declining or worsening    Shift Goals  Clinical Goals: Mobility, BM, safety  Patient Goals: Comfort, rest  Family Goals: N/A    Progress made toward(s) clinical / shift goals:  Pt free from falls    Patient is not progressing towards the following goals:

## 2024-07-02 NOTE — CARE PLAN
The patient is Stable - Low risk of patient condition declining or worsening    Shift Goals  Clinical Goals: safety, mobility  Patient Goals: pain management  Family Goals: not present    Progress made toward(s) clinical / shift goals:  yes      Problem: Pain - Standard  Goal: Alleviation of pain or a reduction in pain to the patient’s comfort goal  Outcome: Progressing     Problem: Knowledge Deficit - Standard  Goal: Patient and family/care givers will demonstrate understanding of plan of care, disease process/condition, diagnostic tests and medications  Outcome: Progressing     Problem: Fall Risk  Goal: Patient will remain free from falls  Outcome: Progressing     Problem: Skin Integrity  Goal: Skin integrity is maintained or improved  Outcome: Progressing     Problem: Urinary Elimination  Goal: Establish and maintain regular urinary output  Outcome: Progressing     Problem: Bowel Elimination  Goal: Establish and maintain regular bowel function  Outcome: Progressing     Problem: Mobility  Goal: Patient's capacity to carry out activities will improve  Outcome: Progressing  Flowsheets (Taken 7/2/2024 1641)  Mobility:   Encouraged mobilization per interdisciplinary team recommendations   Monitored for signs of activity intolerance   Provided assistive devices   Provided rest periods between activities   Administered pain management to allow progressive mobilization   Collaborated with PT/OT     Problem: Respiratory  Goal: Patient will achieve/maintain optimum respiratory ventilation and gas exchange  Outcome: Progressing     Problem: Infection - Standard  Goal: Patient will remain free from infection  Outcome: Progressing  Flowsheets (Taken 7/2/2024 1641)  Standard Infection Interventions:   Assessed for signs and symptoms of infection   Implemented standard precautions   Instructed patient/family on signs and symptoms of infection   Provided education on proper hand hygiene and infection prevention measures    Assessed for removal IV, central lines, intra-arterial or urinary catheters     Problem: Wound/ / Incision Healing  Goal: Patient's wound/surgical incision will decrease in size and heals properly  Outcome: Progressing

## 2024-07-02 NOTE — CARE PLAN
The patient is Stable - Low risk of patient condition declining or worsening    Shift Goals  Clinical Goals: Mobility, BM, safety  Patient Goals: Comfort, rest  Family Goals: N/A    Progress made toward(s) clinical / shift goals:  Yes      Problem: Knowledge Deficit - Standard  Goal: Patient and family/care givers will demonstrate understanding of plan of care, disease process/condition, diagnostic tests and medications  Outcome: Progressing  Education provided on plan of care this shift. Questions answered. Patient requires reinforcement of teaching.     Problem: Fall Risk  Goal: Patient will remain free from falls  Outcome: Progressing  Bed alarm on, call light and side table in reach, treaded socks on, no DME at bedside, hourly rounding in place. Reminded patient to call for assistance. Kept room clutter free.    Problem: Pain - Standard  Goal: Alleviation of pain or a reduction in pain to the patient’s comfort goal  Outcome: Progressing  Patient educated on pain scale. Encouraged patient to verbalize pain. Patient nonpharmacological measures in place such as reposition, and pillows for comfort.

## 2024-07-02 NOTE — THERAPY
Physical Therapy   Daily Treatment     Patient Name: Victoriano Vasquez  Age:  84 y.o., Sex:  male  Medical Record #: [id number]  Today's Date: 7/2/2024     Precautions  Precautions: Fall Risk;Spinal / Back Precautions ;TLSO (Thoracolumbosacral orthosis)  Comments: TLSO when OOB    Assessment    Pt greeted and seen for PT treatment. Pt reporting highest pain level since sx, he was greeted in recliner chair sacrum sitting, with butt slid forward in chair; discussed sitting posture and spinal precautions. Pt was able to implement VC to improve sitting posture in chair, very receptive. He declined ambulating in the [place] due to high pain level but requested to return to supine to see if pain improves there. He stood w/ FWW and CGA then ambulated to bed w/ CGA. He needed Jorje to bring B LE into bed, HOB flat. He needed cues throughout return to supine for spinal precautions and log roll, reinforcement needed. Discussed possibility of pt discharging home w/ assist from family, however wife reported not feeling comfortable with pt DC home at this time and she is unable to provide 24/7 assist. Pt currently limited by impaired balance 2/2 pain, decreased strength and decreased activity tolerance which negatively impacts functional mobility. Pt will continue to benefit from skilled PT to address deficits.       Plan    Treatment Plan Status: Continue Current Treatment Plan  Type of Treatment: Bed Mobility, Equipment, Gait Training, Neuro Re-Education / Balance, Self Care / Home Evaluation, Stair Training, Therapeutic Activities, Therapeutic Exercise  Treatment Frequency: 5 Times per Week  Treatment Duration: Until Therapy Goals Met    DC Equipment Recommendations: Unable to determine at this time  Discharge Recommendations: Recommend post-acute placement for additional physical therapy services prior to discharge home (family unable to provide 24/7 assist at this time.)       07/02/24 1110   Cognition    Comments Pleasant and  cooperative, supportive wife at beside, receptive to education   Other Treatments   Other Treatments Provided Detailed discussion on sitting posture and spinal precautions. Pt greeted sitting in chair, sacrum sitting, butt near middle of seat and reported highest level of pain he's had since admission. With time pt was able to scoot himself back to sit with improved posture. Very receptive to education. Pt did not have on very loosely.   Balance   Sitting Balance (Static) Fair   Sitting Balance (Dynamic) Fair   Standing Balance (Static) Fair   Standing Balance (Dynamic) Fair -   Weight Shift Sitting Fair   Weight Shift Standing Fair   Skilled Intervention Verbal Cuing;Tactile Cuing;Sequencing;Facilitation;Compensatory Strategies   Comments w/ FWW   Bed Mobility    Sit to Supine Minimal Assist   Rolling Standby Assist   Skilled Intervention Verbal Cuing;Sequencing;Tactile Cuing;Facilitation;Compensatory Strategies   Comments poor carryover of log roll and spinal precautions reinforcement needed. Pt needed Jorje to dof TLSO.   Gait Analysis   Gait Level Of Assist Contact Guard Assist   Assistive Device Front Wheel Walker   Distance (Feet) 12   # of Times Distance was Traveled 1   Deviation Bradykinetic   Weight Bearing Status full   Skilled Intervention Verbal Cuing   Comments VC for posture. Pt declined ambulating further due to high pain level.   Functional Mobility   Sit to Stand Contact Guard Assist   Bed, Chair, Wheelchair Transfer Contact Guard Assist   Transfer Method Stand Step   Mobility chair>bed   Skilled Intervention Verbal Cuing;Sequencing   Comments Pt pushing off from chair w/ B UE.   Short Term Goals    Short Term Goal # 1 Pt will transfer with FWW and supervision to progress function in 6 visits.   Goal Outcome # 1 Progressing as expected   Short Term Goal # 2 Pt will ambulate 200ft with FWW and supervision to progress function in 6 visits.   Goal Outcome # 2 Progressing as expected   Short Term Goal #  3 Pt will negotiate 5+ stairs with min A for home entry in 6 visits.   Goal Outcome # 3 Goal met, new goal added   Short Term Goal # 3 B Pt will negotiate 5+ stairs with SPV for home entry in 6 visits.   Goal Outcome # 3 B Goal met   Supervising Physical Therapist (PTA Treatments Only)   Supervising Physical Therapist Darline Tejeda

## 2024-07-02 NOTE — DISCHARGE PLANNING
Case Management Discharge Planning    Admission Date: 6/24/2024  GMLOS: 2.8  ALOS: 8    6-Clicks ADL Score: 14  6-Clicks Mobility Score: 18  PT and/or OT Eval ordered: Yes  Post-acute Referrals Ordered: Yes  Post-acute Choice Obtained: No  Has referral(s) been sent to post-acute provider:  Yes      Anticipated Discharge Dispo: Discharge Disposition: D/T to SNF with Medicare cert in anticipation of skilled care (03)    DME Needed: No    Action(s) Taken: Pt was discussed during morning rounds, pt in and out of confused state. LMSW attempted to obtain choice however, pt stated that he would like to discuss with his wife. SW attempted to contact pt's spouse, left VM with call back request. SW requested bedside RN to notify when spouse is at bedside.    UPDATE 0930  [facility] received notice from charge RN that pt's spouse is at bedside. [facility] met with pt and pt's spouse to obtain choice. Pt stated that his wife can make choice, pt's spouse made choice for 1) [facility]. Choice form was faxed to [facility]. [facility] requested for [facility] to call [facility] to begin insurance auth.    UPDATE 1500  [facility] received notice that insurance has declined this pt due to pt ability to ambulate. Pt does not have support available 24/7, which is the reason that pt and pt's spouse are requesting placement. [facility] requested that insurance re-evaluate pt.    Escalations Completed: None    Medically Clear: No    Next Steps: SW to obtain choice with pt and pt's spouse at bedside.    Barriers to Discharge: Pending Placement and Pending Insurance Authorization    Is the patient up for discharge tomorrow: Potentially.

## 2024-07-02 NOTE — DISCHARGE PLANNING
0942  Agency/Facility Name: [facility]   Spoke To: Viry  Outcome: DPA called to request facility starts insurance auth. They are going to review and call back.     1054  Agency/Facility Name: [facility]   Spoke To: Viry  Outcome: DPA received call that they have started insurance auth.

## 2024-07-03 PROCEDURE — 97530 THERAPEUTIC ACTIVITIES: CPT | Mod: CQ

## 2024-07-03 PROCEDURE — 770001 HCHG ROOM/CARE - MED/SURG/GYN PRIV*

## 2024-07-03 PROCEDURE — 700101 HCHG RX REV CODE 250

## 2024-07-03 PROCEDURE — A9270 NON-COVERED ITEM OR SERVICE: HCPCS | Performed by: NURSE PRACTITIONER

## 2024-07-03 PROCEDURE — A9270 NON-COVERED ITEM OR SERVICE: HCPCS

## 2024-07-03 PROCEDURE — 99232 SBSQ HOSP IP/OBS MODERATE 35: CPT | Mod: GC | Performed by: FAMILY MEDICINE

## 2024-07-03 PROCEDURE — 700102 HCHG RX REV CODE 250 W/ 637 OVERRIDE(OP): Performed by: NURSE PRACTITIONER

## 2024-07-03 PROCEDURE — 700102 HCHG RX REV CODE 250 W/ 637 OVERRIDE(OP)

## 2024-07-03 RX ADMIN — SENNOSIDES AND DOCUSATE SODIUM 1 TABLET: 50; 8.6 TABLET ORAL at 20:26

## 2024-07-03 RX ADMIN — POLYETHYLENE GLYCOL 3350 1 PACKET: 17 POWDER, FOR SOLUTION ORAL at 16:06

## 2024-07-03 RX ADMIN — ATORVASTATIN CALCIUM 40 MG: 40 TABLET, FILM COATED ORAL at 16:06

## 2024-07-03 RX ADMIN — METOPROLOL SUCCINATE 25 MG: 25 TABLET, EXTENDED RELEASE ORAL at 16:06

## 2024-07-03 RX ADMIN — AMLODIPINE BESYLATE 5 MG: 5 TABLET ORAL at 05:02

## 2024-07-03 RX ADMIN — LIDOCAINE 2 PATCH: 4 PATCH TOPICAL at 11:46

## 2024-07-03 RX ADMIN — LEVOTHYROXINE SODIUM 100 MCG: 0.1 TABLET ORAL at 05:02

## 2024-07-03 RX ADMIN — METOPROLOL SUCCINATE 25 MG: 25 TABLET, EXTENDED RELEASE ORAL at 05:02

## 2024-07-03 RX ADMIN — POLYETHYLENE GLYCOL 3350 1 PACKET: 17 POWDER, FOR SOLUTION ORAL at 06:00

## 2024-07-03 ASSESSMENT — PAIN DESCRIPTION - PAIN TYPE
TYPE: SURGICAL PAIN
TYPE: ACUTE PAIN;SURGICAL PAIN
TYPE: SURGICAL PAIN
TYPE: SURGICAL PAIN

## 2024-07-03 NOTE — DISCHARGE PLANNING
Case Management Discharge Planning    Admission Date: 6/24/2024  GMLOS: 2.8  ALOS: 9    6-Clicks ADL Score: 15  6-Clicks Mobility Score: 18  PT and/or OT Eval ordered: Yes  Post-acute Referrals Ordered: Yes  Post-acute Choice Obtained: Yes  Has referral(s) been sent to post-acute provider:  Yes      Anticipated Discharge Dispo: Discharge Disposition: D/T to SNF with Medicare cert in anticipation of skilled care (03)    DME Needed: No    Action(s) Taken: LMSW met with pt's spouse in the morning, who stated that she was on her way to work but she will be in the afternoon to discuss DCP. LMSW placed phone call to [facility] regarding re-evaluation for placement. Lexi stated that pt will not be approved as he can ambulate 150ft, despite PT/OT recommendations for post-acute placement. LMSW informed provider, who stated that pt shows worsening mobility and increased need for assistance, which he cannot get at home. LMSW placed phone call to [facility] to request a peer to peer, awaiting call back at this time. [facility] also recommended SW to send referral to [facility] rehab, SW requested [facility] to send referral.     UPDATE 1320  LMSW received phone call from [facility] requesting for auth to be re-submitted at [facility]. Lexi also stated that if [facility] does not accept, we can submit at another accepting facility.    Escalations Completed: None    Medically Clear: Yes    Next Steps: LMSW to follow for potential placement.     Barriers to Discharge: Pending Placement and Pending Insurance Authorization    Is the patient up for discharge tomorrow: Potentially.

## 2024-07-03 NOTE — THERAPY
Physical Therapy Contact Note    Patient Name: Victoriano Vasquez  Age:  84 y.o., Sex:  male  Medical Record #: [id number]  Today's Date: 7/3/2024    Pt greeted and attempted for follow up PT session,  for Korean was used throughout. Pt unable to recall spinal precautions, reinforced spinal precautions with handout present. Pt then refused mobility at this time. Will re-attempt as able.

## 2024-07-03 NOTE — CARE PLAN
The patient is Stable - Low risk of patient condition declining or worsening    Shift Goals  Clinical Goals: Safety, Mobility, Pain Control  Patient Goals: Comfort, Rest  Family Goals: not present    Progress made toward(s) clinical / shift goals:  Yes    Problem: Knowledge Deficit - Standard  Goal: Patient and family/care givers will demonstrate understanding of plan of care, disease process/condition, diagnostic tests and medications  Outcome: Progressing  Education provided on plan of care this shift. Questions answered. Patient verbalizes understanding.      Problem: Fall Risk  Goal: Patient will remain free from falls  Outcome: Progressing  Bed alarm on, call light and side table in reach, treaded socks on, no DME at bedside, hourly rounding in place. Reminded patient to call for assistance. Kept room clutter free.    Problem: Respiratory  Goal: Patient will achieve/maintain optimum respiratory ventilation and gas exchange  Outcome: Progressing  Educated patient on use of incentive spirometer. Patient verbalized understanding.

## 2024-07-03 NOTE — DISCHARGE PLANNING
1113  Agency/Facility Name: [facility]  Spoke To: Viry  Outcome: DPA received call from facility that insurance auth was declined    1307  [facility]erral [facility].     1324  Agency/Facility Name: [facility]  Spoke To: Viry   Outcome: DPA called requesting facility resend insurance auth per LSW. Viry is going to reach out to insurance and follow up later.     1407  Agency/Facility Name: [facility]  Spoke To: Viry   Outcome: DPA received call from facility requesting updated PT notes. DPA manually faxed.

## 2024-07-03 NOTE — PROGRESS NOTES
FAMILY MEDICINE PROGRESS NOTE          PATIENT ID:  NAME:  Victoriano Vasquez  MRN:               [id number]  YOB: 1940    Date of Admission: 6/24/2024     Attending: Jada Jade M.d.     Resident: Ramez Richardson M.D. (PGY-2)    Primary Care Physician:  Susi Gregorio M.D.    HPI: Victoriano Vasquez is a 84 y.o. male with past medical history of HLD, HTN, Hypothyroidism, was admitted on 6/24/2024 for L2 compression fracture, sent in by neurosurgery now s/p T12-L4 fusion. On hospital day 9.    Interval Problem Update  6/30: Seen by PM&R, agree with rehab placement. Pt insurance not accepted by [facility]. Recommend referrals to [facility].  7/1: Medically cleared, awaiting placement.   7/2: Pt denied by insurance, reassessing with PT and will resubmit authorization.    SUBJECTIVE:   No acute events overnight, patient states he is feeling well with less pain, but still significant pain when attempting to ambulate.  He is requiring increased assistance on PT assessment compared to the earlier assessment.  Patient unable to complete ADLs without moderate assistance for most ADLs.  No family available at home to assist with ADLs as wife is working.    Wife requesting assistance with [place] paperwork.    OBJECTIVE:  Temp:  [36.7 °C (98.1 °F)-37 °C (98.6 °F)] 36.8 °C (98.2 °F)  Pulse:  [67-86] 71  Resp:  [15-16] 16  BP: ()/(56-61) 126/60  SpO2:  [93 %-94 %] 93 %    No intake or output data in the 24 hours ending 07/03/24 1337      PHYSICAL EXAM:  Physical Exam  Vitals reviewed.   Constitutional:       General: He is not in acute distress.     Appearance: Normal appearance. He is not toxic-appearing.      Comments: Very pleasant elderly male lying in hospital bed.  utilized via telephone   HENT:      Head: Normocephalic and atraumatic.      Right Ear: External ear normal.      Left Ear: External ear normal.      Nose: Nose normal. No congestion.      Mouth/Throat:      Mouth: Mucous  "membranes are moist.      Pharynx: Oropharynx is clear.   Eyes:      Extraocular Movements: Extraocular movements intact.      Pupils: Pupils are equal, round, and reactive to light.   Cardiovascular:      Rate and Rhythm: Normal rate and regular rhythm.      Heart sounds: No murmur heard.  Pulmonary:      Effort: Pulmonary effort is normal. No respiratory distress.      Breath sounds: Normal breath sounds.   Abdominal:      General: Abdomen is flat. Bowel sounds are normal. There is no distension.      Palpations: Abdomen is soft.      Tenderness: There is no abdominal tenderness. There is no guarding or rebound.   Musculoskeletal:         General: No swelling or deformity.      Cervical back: Normal range of motion and neck supple.      Comments: Range of motion limited by pain.  Improved by lying still.   Skin:     General: Skin is warm and dry.      Capillary Refill: Capillary refill takes less than 2 seconds.      Findings: No rash.   Neurological:      General: No focal deficit present.      Mental Status: He is alert and oriented to person, place, and time.      Cranial Nerves: No cranial nerve deficit.   Psychiatric:         Mood and Affect: Mood normal.         Behavior: Behavior normal.           LABS:  Recent Labs     07/01/24  0000   WBC 11.3*   RBC 3.56*   HEMOGLOBIN 10.9*   HEMATOCRIT 32.4*   MCV 91.0   MCH 30.6   RDW 57.1*   PLATELETCT 233   MPV 10.4     Recent Labs     07/01/24  0000   SODIUM 134*   POTASSIUM 3.5*   CHLORIDE 99   CO2 22   BUN 12   CREATININE 0.46*   CALCIUM 7.8*     Estimated GFR/CRCL = Estimated Creatinine Clearance: 110.2 mL/min (A) (by C-G formula based on SCr of 0.46 mg/dL (L)).  Recent Labs     07/01/24  0000   GLUCOSE 99                   No results for input(s): \"INR\", \"APTT\", \"DDIMER\", \"FIBRINOGEN\" in the last 72 hours.      IMAGING:  DX-PORTABLE FLUORO > 1 HOUR   Final Result      Portable fluoroscopy utilized for 7.4 seconds.         INTERPRETING LOCATION: [address] [place] [address]" [place], [address]      DX-THORACOLUMBAR SPINE-2 VIEWS   Final Result      Digitized intraoperative radiograph is submitted for review. This examination is not for diagnostic purpose but for guidance during a surgical procedure. Please see the patient's chart for full procedural details.         INTERPRETING LOCATION: [address], [place], [address]      DX-O-ARM   Final Result      Portable O-arm utilized for 3.23 seconds.      INTERPRETING LOCATION: [address], [place], [address]      DX-CHEST-PORTABLE (1 VIEW)   Final Result      1.  Hypoinflation without other evidence for acute cardiopulmonary disease.   2.  Evidence of old granulomatous disease.      CT-LSPINE W/O PLUS RECONS   Final Result         1. Burst fracture of L2 is again noted with severe canal stenosis.          CULTURES:   Results       Procedure Component Value Units Date/Time    URINALYSIS [[id number]]  (Abnormal) Collected: 07/01/24 1055    Order Status: Completed Specimen: Urine, Clean Catch Updated: 07/01/24 1355     Color Yellow     Character Clear     Specific Gravity 1.019     Ph 8.0     Glucose Negative mg/dL      Ketones 40 mg/dL      Protein 30 mg/dL      Bilirubin Negative     Urobilinogen, Urine 0.2     Nitrite Negative     Leukocyte Esterase Trace     Occult Blood Trace     Micro Urine Req Microscopic    URINALYSIS CULTURE, IF INDICATED [[id number]]  (Abnormal) Collected: 06/27/24 1405    Order Status: Completed Specimen: Urine, Clean Catch Updated: 06/27/24 2215     Color Yellow     Character Clear     Specific Gravity 1.025     Ph 5.0     Glucose Negative mg/dL      Ketones 15 mg/dL      Protein Negative mg/dL      Bilirubin Negative     Urobilinogen, Urine 0.2     Nitrite Negative     Leukocyte Esterase Negative     Occult Blood Trace     Micro Urine Req Microscopic            MEDS:  Current Facility-Administered Medications   Medication Last Admin    acetaminophen (Tylenol) tablet 1,000 mg 1,000 mg at 07/02/24 0819    polyethylene  glycol/lytes (Miralax) Packet 1 Packet 1 Packet at 07/03/24 0600    oxyCODONE immediate-release (Roxicodone) tablet 5 mg 5 mg at 07/02/24 1727    oxyCODONE immediate release (Roxicodone) tablet 10 mg      morphine 4 MG/ML injection 2 mg      Pharmacy Consult Request ...Pain Management Review 1 Each      MD ALERT...DO NOT ADMINISTER NSAIDS or ASPIRIN unless ORDERED By Neurosurgery 1 Each      senna-docusate (Pericolace Or Senokot S) 8.6-50 MG per tablet 1 Tablet 1 Tablet at 07/02/24 2057    senna-docusate (Pericolace Or Senokot S) 8.6-50 MG per tablet 1 Tablet 1 Tablet at 06/28/24 0900    polyethylene glycol/lytes (Miralax) Packet 1 Packet 1 Packet at 06/30/24 1714    magnesium hydroxide (Milk Of Magnesia) suspension 30 mL 30 mL at 07/01/24 0430    bisacodyl (Dulcolax) suppository 10 mg      sodium phosphate (Fleet) enema 133 mL      diphenhydrAMINE (Benadryl) tablet/capsule 25 mg      Or    diphenhydrAMINE (Benadryl) injection 25 mg      ondansetron (Zofran) syringe/vial injection 4 mg      ondansetron (Zofran ODT) dispertab 4 mg      tizanidine (Zanaflex) tablet 2 mg      labetalol (Normodyne/Trandate) injection 10 mg      hydrALAZINE (Apresoline) injection 10 mg      lidocaine (Asperflex) 4 % patch 2 Patch 2 Patch at 07/03/24 1146    labetalol (Normodyne/Trandate) injection 10 mg      amLODIPine (Norvasc) tablet 5 mg 5 mg at 07/03/24 0502    atorvastatin (Lipitor) tablet 40 mg 40 mg at 07/02/24 1727    levothyroxine (Synthroid) tablet 100 mcg 100 mcg at 07/03/24 0502    metoprolol SR (Toprol XL) tablet 25 mg 25 mg at 07/03/24 0502       ASSESSMENT/PLAN:  84 y.o. male admitted for   * Lumbar compression fracture, closed, initial encounter (HCC)- (present on admission)  Assessment & Plan  S/p T12-L4 fusion on 6/27 by neurosurgery. PCA pump Dc'd 6/28, pt not in sig pain. Hemovac removed 6/29.   Plan:   -  Tylenol 1000 mg q6h prn, oxycodone 5-10 mg q4h PRN, IV morphine 2 mg q3 hours prn, Tizanidine 2 mg TID PRN  - PT  "consulted recommend acute placement, PM&R saw patient and agreed with plan for rehab.  Patient's insurance not excepted by [facility], recommend referral to [facility].  Case management working on placement   -7/2 referral sent to SNF, awaiting insurance Auth   -7/3 SNF authorization denied by insurance.  Discussed with , they state denial was due to patient being able to ambulate 150 feet, noted in PT note from 7/1.  Notes from 7/2 indicate patient is requiring increased assistance with ambulation secondary to pain, reiterating need for postacute placement.  Asked case management to resubmit request, offered peer to peer if needed for insurance authorization.  - Neurosurgery recommendations: No DVT PPx, TLSO brace when patient is up and walking.   - Fall precautions  - Regular diet  - MiraLAX BID, senna-docusate daily    Leukocytosis  Assessment & Plan  Developed a mild leukocytosis post op. Likely related to surgical procedure. Patient is otherwise asymptomatic.   - WBC trending to normal. Ok to stop trending 7/1    Poor nutrition- (present on admission)  Assessment & Plan  Nursing staff and wife report patient has poor PO intake. Patient reports he has \"moods and times when he does not want to eat\". He is amenable to trying nutrition shakes.   Plan:   - Ensure Vanilla shakes with each meal   -7/2 wife reports patient still has decreased p.o. intake.  May need further nutritional support after discharge    Urinary frequency  Assessment & Plan  Patient complaining of urinary frequency. Could be UTI, BPH, or overactive bladder (this is per patient).   Plan:   - UA 7/1 shows no signs of nicole infection.  Patient denying urinary symptoms.  Will hold off on treatment for now, continue to monitor    Normocytic anemia  Assessment & Plan  Hgb 13.9 on admission. Post procedure stable at 11.0.   - Hgb stable at 10.9 7/1, ok to stop daily trending    HLD (hyperlipidemia)- (present on admission)  Assessment & Plan  Lipid " panel with LDL at 138 two months ago.   -Continue home atorvastatin 40 mg.     Primary hypertension- (present on admission)  Assessment & Plan  Continue home amlodipine 5mg and metoprolol SR 25 mg daily.     Hypothyroidism- (present on admission)  Assessment & Plan  TSH at 2 months ago of 2.480.   -Continue home levothyroxine 100 mcg.          Core Measures:  Fluids: IV push only, no IV fluids   Lines: Peripheral IV for intravenous access  Abx: None  Diet: regular diet   PPX: pharmacologic prophylaxis contraindicated due to recent surgery, per neurosurgery recs    CODE Status: Full Code      Disposition  Patient is medically cleared pending SNF/rehab placement  for discharge.   Anticipate discharge to skilled nursing facility.  I have placed the appropriate orders for post-discharge needs.    I have personally seen and examined the patient at bedside. I discussed the plan of care with patient, , and  Jada Jade M.d..      Ramez Richardson M.D.   PGY-2  [facility]

## 2024-07-04 PROCEDURE — 700102 HCHG RX REV CODE 250 W/ 637 OVERRIDE(OP): Performed by: NURSE PRACTITIONER

## 2024-07-04 PROCEDURE — 770001 HCHG ROOM/CARE - MED/SURG/GYN PRIV*

## 2024-07-04 PROCEDURE — 700102 HCHG RX REV CODE 250 W/ 637 OVERRIDE(OP)

## 2024-07-04 PROCEDURE — 700101 HCHG RX REV CODE 250

## 2024-07-04 PROCEDURE — 97530 THERAPEUTIC ACTIVITIES: CPT

## 2024-07-04 PROCEDURE — 99231 SBSQ HOSP IP/OBS SF/LOW 25: CPT | Mod: GC | Performed by: FAMILY MEDICINE

## 2024-07-04 PROCEDURE — A9270 NON-COVERED ITEM OR SERVICE: HCPCS | Performed by: NURSE PRACTITIONER

## 2024-07-04 PROCEDURE — A9270 NON-COVERED ITEM OR SERVICE: HCPCS

## 2024-07-04 RX ADMIN — ATORVASTATIN CALCIUM 40 MG: 40 TABLET, FILM COATED ORAL at 17:32

## 2024-07-04 RX ADMIN — POLYETHYLENE GLYCOL 3350 1 PACKET: 17 POWDER, FOR SOLUTION ORAL at 05:38

## 2024-07-04 RX ADMIN — MAGNESIUM HYDROXIDE 30 ML: 1200 LIQUID ORAL at 05:39

## 2024-07-04 RX ADMIN — LEVOTHYROXINE SODIUM 100 MCG: 0.1 TABLET ORAL at 05:38

## 2024-07-04 RX ADMIN — LIDOCAINE 2 PATCH: 4 PATCH TOPICAL at 12:16

## 2024-07-04 ASSESSMENT — COGNITIVE AND FUNCTIONAL STATUS - GENERAL
MOBILITY SCORE: 18
WALKING IN HOSPITAL ROOM: A LITTLE
CLIMB 3 TO 5 STEPS WITH RAILING: A LITTLE
MOVING FROM LYING ON BACK TO SITTING ON SIDE OF FLAT BED: A LITTLE
STANDING UP FROM CHAIR USING ARMS: A LITTLE
SUGGESTED CMS G CODE MODIFIER MOBILITY: CK
TURNING FROM BACK TO SIDE WHILE IN FLAT BAD: A LITTLE
MOVING TO AND FROM BED TO CHAIR: A LITTLE

## 2024-07-04 ASSESSMENT — PAIN DESCRIPTION - PAIN TYPE
TYPE: SURGICAL PAIN
TYPE: ACUTE PAIN;SURGICAL PAIN
TYPE: ACUTE PAIN
TYPE: SURGICAL PAIN
TYPE: ACUTE PAIN

## 2024-07-04 ASSESSMENT — GAIT ASSESSMENTS
DEVIATION: BRADYKINETIC
GAIT LEVEL OF ASSIST: STANDBY ASSIST
DISTANCE (FEET): 125
ASSISTIVE DEVICE: FRONT WHEEL WALKER

## 2024-07-04 NOTE — THERAPY
"Physical Therapy   Daily Treatment     Patient Name: Victoriano Vasquez  Age:  84 y.o., Sex:  male  Medical Record #: [id number]  Today's Date: 7/4/2024     Precautions  Precautions: Fall Risk;Spinal / Back Precautions   Comments: TLSO EOB when OOB    Assessment    Pt received in bed and agreeable to PT session. Pt required overall standby to CGA for out of bed mobility and standby to min A for sup<>sit via log roll with HOB flat. Pt required assist as well to don the TLSO properly when sitting at EOB. Pt limited by fatigue and declined sitting up following ambulation. Given pt's continued need for assist and family not available throughout the day, recommend post-acute placement for further rehab. Will follow for acute PT to progress as able.    Plan    Treatment Plan Status: Continue Current Treatment Plan  Type of Treatment: Bed Mobility, Equipment, Gait Training, Neuro Re-Education / Balance, Self Care / Home Evaluation, Stair Training, Therapeutic Activities, Therapeutic Exercise  Treatment Frequency: 5 Times per Week  Treatment Duration: Until Therapy Goals Met    DC Equipment Recommendations: Unable to determine at this time  Discharge Recommendations: Recommend post-acute placement for additional physical therapy services prior to discharge home    Subjective    \"We can go on a walk\"     Objective       07/04/24 1501   Precautions   Precautions Fall Risk;Spinal / Back Precautions    Comments TLSO EOB when OOB   Vitals   O2 Delivery Device None - Room Air   Pain 0 - 10 Group   Therapist Pain Assessment Post Activity Pain Same as Prior to Activity;Nurse Notified   Cognition    Level of Consciousness Alert   Comments Pleasant and cooperative, continues to require cues for spine precautions. Spoke in English throughout the session despite offer to utilize the interpretor.   Balance   Sitting Balance (Static) Fair   Sitting Balance (Dynamic) Fair   Standing Balance (Static) Fair   Standing Balance (Dynamic) Fair - "   Weight Shift Sitting Fair   Weight Shift Standing Fair   Skilled Intervention Verbal Cuing;Compensatory Strategies   Comments up with FWW   Bed Mobility    Supine to Sit Supervised   Sit to Supine Minimal Assist   Scooting Supervised   Rolling Supervised   Skilled Intervention Verbal Cuing   Comments Able to complete log roll with minimal cues this session   Gait Analysis   Gait Level Of Assist Standby Assist   Assistive Device Front Wheel Walker   Distance (Feet) 125   # of Times Distance was Traveled 2  (standing rest break)   Deviation Bradykinetic   Skilled Intervention Verbal Cuing   Comments Cues for upright posture   Functional Mobility   Sit to Stand Standby Assist   Bed, Chair, Wheelchair Transfer Standby Assist   Toilet Transfers Standby Assist   Mobility up with FWW   Skilled Intervention Verbal Cuing   Comments with FWW   6 Clicks Assessment - How much HELP from from another person do you currently need... (If the patient hasn't done an activity recently, how much help from another person do you think he/she would need if he/she tried?)   Turning from your back to your side while in a flat bed without using bedrails? 3   Moving from lying on your back to sitting on the side of a flat bed without using bedrails? 3   Moving to and from a bed to a chair (including a wheelchair)? 3   Standing up from a chair using your arms (e.g., wheelchair, or bedside chair)? 3   Walking in hospital room? 3   Climbing 3-5 steps with a railing? 3   6 clicks Mobility Score 18   Short Term Goals    Short Term Goal # 1 Pt will transfer with FWW and supervision to progress function in 6 visits.   Goal Outcome # 1 Progressing as expected   Short Term Goal # 2 Pt will ambulate 200ft with FWW and supervision to progress function in 6 visits.   Goal Outcome # 2 Progressing as expected   Short Term Goal # 3 Pt will negotiate 5+ stairs with min A for home entry in 6 visits.   Goal Outcome # 3 Goal met, new goal added   Short Term  Goal # 3 B Pt will negotiate 5+ stairs with SPV for home entry in 6 visits.   Goal Outcome # 3 B Goal met   Physical Therapy Treatment Plan   Physical Therapy Treatment Plan Continue Current Treatment Plan   Anticipated Discharge Equipment and Recommendations   DC Equipment Recommendations Unable to determine at this time   Discharge Recommendations Recommend post-acute placement for additional physical therapy services prior to discharge home   Interdisciplinary Plan of Care Collaboration   IDT Collaboration with  Nursing   Patient Position at End of Therapy In Bed;Call Light within Reach;Tray Table within Reach;Phone within Reach;Bed Alarm On   Collaboration Comments RN updated   Session Information   Date / Session Number  7/4-4 (4/5, 7/4) att 7/3

## 2024-07-04 NOTE — CARE PLAN
The patient is Stable - Low risk of patient condition declining or worsening    Shift Goals  Clinical Goals: Safety, Mobility  Patient Goals: Pain Control  Family Goals: plan of care    Progress made toward(s) clinical / shift goals:  Yes      Problem: Knowledge Deficit - Standard  Goal: Patient and family/care givers will demonstrate understanding of plan of care, disease process/condition, diagnostic tests and medications  Outcome: Progressing  Education provided on plan of care this shift. Questions answered. Patient verbalizes understanding.     Problem: Bowel Elimination  Goal: Establish and maintain regular bowel function  Outcome: Progressing  Educated patient on bowel protocol. Gave patient Milk of Magnesia. Patient states they are passing gas. Will continue to monitor.    Problem: Respiratory  Goal: Patient will achieve/maintain optimum respiratory ventilation and gas exchange  Outcome: Progressing  Educated patient on use of incentive spirometer. Patient verbalized understanding.

## 2024-07-04 NOTE — DISCHARGE PLANNING
note:  Message sent to Viry at [facility] for update.   Viry said no beds at [facility] today.   CM also LVM for Daxa at [facility].

## 2024-07-04 NOTE — PROGRESS NOTES
FAMILY MEDICINE PROGRESS NOTE          PATIENT ID:  NAME:  Victoriano Vasquez  MRN:               [id number]  YOB: 1940    Date of Admission: 6/24/2024     Attending: Jada Jade M.d.     Resident: Ramez Richardson M.D. (PGY-2)    Primary Care Physician:  Susi Gregorio M.D.    HPI: Victoriano Vasquez is a 84 y.o. male with past medical history of HLD, HTN, Hypothyroidism, was admitted on 6/24/2024 for L2 compression fracture, sent in by neurosurgery now s/p T12-L4 fusion. On hospital day 10.    Interval Problem Update  6/30: Seen by PM&R, agree with rehab placement. Pt insurance not accepted by [facility]. Recommend referrals to [facility].  7/1: Medically cleared, awaiting placement.   7/2: Pt denied by insurance, reassessing with PT and will resubmit authorization.  7/3: Insurance denied again. Discussed with CM, offered Peer to peer with insurance. Insurance agreed to review again.    SUBJECTIVE:   No acute events overnight, patient states he is feeling well while at rest, but still having pain with sitting for prolonged periods or walking long distances. Awaiting SNF placement.    OBJECTIVE:  Temp:  [36.8 °C (98.2 °F)-37 °C (98.6 °F)] 37 °C (98.6 °F)  Pulse:  [69-72] 69  Resp:  [14-17] 17  BP: (101-126)/(48-60) 101/48  SpO2:  [93 %-95 %] 95 %    No intake or output data in the 24 hours ending 07/04/24 0716      PHYSICAL EXAM:  Physical Exam  Vitals reviewed.   Constitutional:       General: He is not in acute distress.     Appearance: Normal appearance. He is not toxic-appearing.      Comments: Very pleasant elderly male sitting in bedside chair. Wife at bedside.   HENT:      Head: Normocephalic and atraumatic.      Right Ear: External ear normal.      Left Ear: External ear normal.      Nose: Nose normal. No congestion.      Mouth/Throat:      Mouth: Mucous membranes are moist.      Pharynx: Oropharynx is clear.   Eyes:      Extraocular Movements: Extraocular movements intact.      Pupils: Pupils  "are equal, round, and reactive to light.   Cardiovascular:      Rate and Rhythm: Normal rate and regular rhythm.      Heart sounds: No murmur heard.  Pulmonary:      Effort: Pulmonary effort is normal. No respiratory distress.      Breath sounds: Normal breath sounds.   Abdominal:      General: Abdomen is flat. Bowel sounds are normal. There is no distension.      Palpations: Abdomen is soft.      Tenderness: There is no abdominal tenderness. There is no guarding or rebound.   Musculoskeletal:         General: No swelling or deformity.      Cervical back: Normal range of motion and neck supple.      Comments: Range of motion limited by pain.  Improved by lying still.   Skin:     General: Skin is warm and dry.      Capillary Refill: Capillary refill takes less than 2 seconds.      Findings: No rash.   Neurological:      General: No focal deficit present.      Mental Status: He is alert and oriented to person, place, and time.      Cranial Nerves: No cranial nerve deficit.   Psychiatric:         Mood and Affect: Mood normal.         Behavior: Behavior normal.           LABS:  No results for input(s): \"WBC\", \"RBC\", \"HEMOGLOBIN\", \"HEMATOCRIT\", \"MCV\", \"MCH\", \"RDW\", \"PLATELETCT\", \"MPV\", \"NEUTSPOLYS\", \"LYMPHOCYTES\", \"MONOCYTES\", \"EOSINOPHILS\", \"BASOPHILS\", \"RBCMORPHOLO\" in the last 72 hours.    No results for input(s): \"SODIUM\", \"POTASSIUM\", \"CHLORIDE\", \"CO2\", \"BUN\", \"CREATININE\", \"CALCIUM\", \"MAGNESIUM\", \"PHOSPHORUS\", \"ALBUMIN\" in the last 72 hours.    Estimated GFR/CRCL = Estimated Creatinine Clearance: 110.2 mL/min (A) (by C-G formula based on SCr of 0.46 mg/dL (L)).  No results for input(s): \"GLUCOSE\", \"POCGLUCOSE\" in the last 72 hours.                  No results for input(s): \"INR\", \"APTT\", \"DDIMER\", \"FIBRINOGEN\" in the last 72 hours.      IMAGING:  DX-PORTABLE FLUORO > 1 HOUR   Final Result      Portable fluoroscopy utilized for 7.4 seconds.         INTERPRETING LOCATION: [address] [place] [address] [place], [id number]    "   DX-THORACOLUMBAR SPINE-2 VIEWS   Final Result      Digitized intraoperative radiograph is submitted for review. This examination is not for diagnostic purpose but for guidance during a surgical procedure. Please see the patient's chart for full procedural details.         INTERPRETING LOCATION: [address], [place], [address]      DX-O-ARM   Final Result      Portable O-arm utilized for 3.23 seconds.      INTERPRETING LOCATION: [address], [place], [address]      DX-CHEST-PORTABLE (1 VIEW)   Final Result      1.  Hypoinflation without other evidence for acute cardiopulmonary disease.   2.  Evidence of old granulomatous disease.      CT-LSPINE W/O PLUS RECONS   Final Result         1. Burst fracture of L2 is again noted with severe canal stenosis.          CULTURES:   Results       Procedure Component Value Units Date/Time    URINALYSIS [[id number]]  (Abnormal) Collected: 07/01/24 1055    Order Status: Completed Specimen: Urine, Clean Catch Updated: 07/01/24 1355     Color Yellow     Character Clear     Specific Gravity 1.019     Ph 8.0     Glucose Negative mg/dL      Ketones 40 mg/dL      Protein 30 mg/dL      Bilirubin Negative     Urobilinogen, Urine 0.2     Nitrite Negative     Leukocyte Esterase Trace     Occult Blood Trace     Micro Urine Req Microscopic    URINALYSIS CULTURE, IF INDICATED [[id number]]  (Abnormal) Collected: 06/27/24 1405    Order Status: Completed Specimen: Urine, Clean Catch Updated: 06/27/24 2215     Color Yellow     Character Clear     Specific Gravity 1.025     Ph 5.0     Glucose Negative mg/dL      Ketones 15 mg/dL      Protein Negative mg/dL      Bilirubin Negative     Urobilinogen, Urine 0.2     Nitrite Negative     Leukocyte Esterase Negative     Occult Blood Trace     Micro Urine Req Microscopic            MEDS:  Current Facility-Administered Medications   Medication Last Admin    acetaminophen (Tylenol) tablet 1,000 mg 1,000 mg at 07/02/24 0819    polyethylene glycol/lytes (Miralax)  Packet 1 Packet 1 Packet at 07/04/24 0538    oxyCODONE immediate-release (Roxicodone) tablet 5 mg 5 mg at 07/02/24 1727    oxyCODONE immediate release (Roxicodone) tablet 10 mg      morphine 4 MG/ML injection 2 mg      Pharmacy Consult Request ...Pain Management Review 1 Each      MD ALERT...DO NOT ADMINISTER NSAIDS or ASPIRIN unless ORDERED By Neurosurgery 1 Each      senna-docusate (Pericolace Or Senokot S) 8.6-50 MG per tablet 1 Tablet 1 Tablet at 07/03/24 2026    senna-docusate (Pericolace Or Senokot S) 8.6-50 MG per tablet 1 Tablet 1 Tablet at 06/28/24 0900    polyethylene glycol/lytes (Miralax) Packet 1 Packet 1 Packet at 06/30/24 1714    magnesium hydroxide (Milk Of Magnesia) suspension 30 mL 30 mL at 07/04/24 0539    bisacodyl (Dulcolax) suppository 10 mg      sodium phosphate (Fleet) enema 133 mL      diphenhydrAMINE (Benadryl) tablet/capsule 25 mg      Or    diphenhydrAMINE (Benadryl) injection 25 mg      ondansetron (Zofran) syringe/vial injection 4 mg      ondansetron (Zofran ODT) dispertab 4 mg      tizanidine (Zanaflex) tablet 2 mg      labetalol (Normodyne/Trandate) injection 10 mg      hydrALAZINE (Apresoline) injection 10 mg      lidocaine (Asperflex) 4 % patch 2 Patch 2 Patch at 07/03/24 1146    labetalol (Normodyne/Trandate) injection 10 mg      amLODIPine (Norvasc) tablet 5 mg 5 mg at 07/03/24 0502    atorvastatin (Lipitor) tablet 40 mg 40 mg at 07/03/24 1606    levothyroxine (Synthroid) tablet 100 mcg 100 mcg at 07/04/24 0538    metoprolol SR (Toprol XL) tablet 25 mg 25 mg at 07/03/24 1606       ASSESSMENT/PLAN:  84 y.o. male admitted for   * Lumbar compression fracture, closed, initial encounter (HCC)- (present on admission)  Assessment & Plan  S/p T12-L4 fusion on 6/27 by neurosurgery. PCA pump Dc'd 6/28, pt not in sig pain. Hemovac removed 6/29.   Plan:   -  Tylenol 1000 mg q6h prn, oxycodone 5-10 mg q4h PRN, IV morphine 2 mg q3 hours prn, Tizanidine 2 mg TID PRN  - PT consulted recommend  "acute placement, PM&R saw patient and agreed with plan for rehab.  Patient's insurance not excepted by [facility], recommend referral to [facility].  Case management working on placement   -7/2 referral sent to SNF, awaiting insurance Auth   -7/3 SNF authorization denied by insurance.  Discussed with , they state denial was due to patient being able to ambulate 150 feet, noted in PT note from 7/1.  Notes from 7/2 indicate patient is requiring increased assistance with ambulation secondary to pain, reiterating need for postacute placement.  Asked case management to resubmit request, offered peer to peer if needed for insurance authorization.   -7/4 No beds at SNF. PT worked with patient, still recommending post-acute placement  - Neurosurgery recommendations: No DVT PPx, TLSO brace when patient is up and walking.   - Fall precautions  - Regular diet  - MiraLAX BID, senna-docusate daily    Leukocytosis  Assessment & Plan  Developed a mild leukocytosis post op. Likely related to surgical procedure. Patient is otherwise asymptomatic.   - WBC trending to normal. Ok to stop trending 7/1    Poor nutrition- (present on admission)  Assessment & Plan  Nursing staff and wife report patient has poor PO intake. Patient reports he has \"moods and times when he does not want to eat\". He is amenable to trying nutrition shakes.   Plan:   - Ensure Vanilla shakes with each meal   -7/2 wife reports patient still has decreased p.o. intake.  May need further nutritional support after discharge    Urinary frequency  Assessment & Plan  Patient complaining of urinary frequency. Could be UTI, BPH, or overactive bladder (this is per patient).   Plan:   - UA 7/1 shows no signs of nicole infection.  Patient denying urinary symptoms.  Will hold off on treatment for now, continue to monitor    Normocytic anemia  Assessment & Plan  Hgb 13.9 on admission. Post procedure stable at 11.0.   - Hgb stable at 10.9 7/1, ok to stop daily trending    HLD " (hyperlipidemia)- (present on admission)  Assessment & Plan  Lipid panel with LDL at 138 two months ago.   -Continue home atorvastatin 40 mg.     Primary hypertension- (present on admission)  Assessment & Plan  Continue home amlodipine 5mg and metoprolol SR 25 mg daily.     Hypothyroidism- (present on admission)  Assessment & Plan  TSH at 2 months ago of 2.480.   -Continue home levothyroxine 100 mcg.          Core Measures:  Fluids: IV push only, no IV fluids   Lines: Peripheral IV for intravenous access  Abx: None  Diet: regular diet   PPX: pharmacologic prophylaxis contraindicated due to recent surgery, per neurosurgery recs    CODE Status: Full Code      Disposition  Patient is medically cleared pending SNF/rehab placement  for discharge.   Anticipate discharge to skilled nursing facility.  I have placed the appropriate orders for post-discharge needs.    I have personally seen and examined the patient at bedside. I discussed the plan of care with patient, family, and  Jada Jade M.d..      Ramez Richardson M.D.   PGY-2  [facility]

## 2024-07-04 NOTE — DISCHARGE PLANNING
note:  Daxa said if [facility] already submitted then better to wait .  MD said pt wants to go to to [facility].

## 2024-07-05 PROCEDURE — 700102 HCHG RX REV CODE 250 W/ 637 OVERRIDE(OP)

## 2024-07-05 PROCEDURE — A9270 NON-COVERED ITEM OR SERVICE: HCPCS | Performed by: NURSE PRACTITIONER

## 2024-07-05 PROCEDURE — 700102 HCHG RX REV CODE 250 W/ 637 OVERRIDE(OP): Performed by: NURSE PRACTITIONER

## 2024-07-05 PROCEDURE — 700101 HCHG RX REV CODE 250

## 2024-07-05 PROCEDURE — A9270 NON-COVERED ITEM OR SERVICE: HCPCS

## 2024-07-05 PROCEDURE — 770001 HCHG ROOM/CARE - MED/SURG/GYN PRIV*

## 2024-07-05 PROCEDURE — 99231 SBSQ HOSP IP/OBS SF/LOW 25: CPT | Mod: GC | Performed by: FAMILY MEDICINE

## 2024-07-05 RX ADMIN — LIDOCAINE 2 PATCH: 4 PATCH TOPICAL at 14:54

## 2024-07-05 RX ADMIN — ATORVASTATIN CALCIUM 40 MG: 40 TABLET, FILM COATED ORAL at 17:49

## 2024-07-05 RX ADMIN — OXYCODONE HYDROCHLORIDE 5 MG: 5 TABLET ORAL at 05:41

## 2024-07-05 RX ADMIN — SENNOSIDES AND DOCUSATE SODIUM 1 TABLET: 50; 8.6 TABLET ORAL at 22:38

## 2024-07-05 RX ADMIN — POLYETHYLENE GLYCOL 3350 1 PACKET: 17 POWDER, FOR SOLUTION ORAL at 17:48

## 2024-07-05 RX ADMIN — METOPROLOL SUCCINATE 25 MG: 25 TABLET, EXTENDED RELEASE ORAL at 17:48

## 2024-07-05 RX ADMIN — LEVOTHYROXINE SODIUM 100 MCG: 0.1 TABLET ORAL at 05:41

## 2024-07-05 ASSESSMENT — PAIN DESCRIPTION - PAIN TYPE
TYPE: ACUTE PAIN;SURGICAL PAIN
TYPE: ACUTE PAIN;SURGICAL PAIN

## 2024-07-05 NOTE — DISCHARGE PLANNING
0815  Agency/Facility Name: [facility]  Spoke To: Viry   Outcome: MATTHIAS called regarding pending insurance auth.  They do have insurance auth and Viry is going to confirm bed availability and call back.     0953  Agency/Facility Name: [facility]  Spoke To: Viry   Outcome: DPA received call that there are no beds available today but she will let me know if anything opens up.

## 2024-07-05 NOTE — DISCHARGE PLANNING
Case Management Discharge Planning    Admission Date: 6/24/2024  GMLOS: 2.8  ALOS: 11    Anticipated Discharge Dispo: Discharge Disposition: D/T to SNF with Medicare cert in anticipation of skilled care (03)    DME Needed: No    Action(s) Taken: Per DPA, [facility] has received authorization from insurance but they do not have any beds available, and most likely will not until Monday.  Pt was also accepted at [facility]. DPA LVM for [facility] asking if they have beds available.   KENN GRECO called [facility], with prominence, and left a voicemail asking how long the process would take to get auth for [facility], if patient and wife are agreeable to this.  Awaiting a call back from [facility] and [facility].  KENN GRECO updated Dr. Richardson.      Escalations Completed: None    Medically Clear: Yes    Next Steps: RN CM to continue to follow for DC planning      Barriers to Discharge: Pending Placement

## 2024-07-05 NOTE — CARE PLAN
The patient is Stable - Low risk of patient condition declining or worsening    Shift Goals  Clinical Goals: pain control, safety  Patient Goals: pain control  Family Goals: plan of care    Progress made toward(s) clinical / shift goals:    Problem: Pain - Standard  Goal: Alleviation of pain or a reduction in pain to the patient’s comfort goal  Outcome: Progressing  Note: Pt states pain is relieved with current pain medication regimen. Pt medicated per MAR with + results. Pt provided ice packs and pillows for comfort.        Problem: Fall Risk  Goal: Patient will remain free from falls  Outcome: Progressing  Note: Bed alarm on, call light and side table in reach, treaded socks on, no DME at bedside, hourly rounding in place.

## 2024-07-05 NOTE — CARE PLAN
The patient is Stable - Low risk of patient condition declining or worsening    Shift Goals  Clinical Goals: Safety and Mobility  Patient Goals: Comfort  Family Goals: Not Present    Progress made toward(s) clinical / shift goals:    Problem: Pain - Standard  Goal: Alleviation of pain or a reduction in pain to the patient’s comfort goal  Outcome: Progressing     Problem: Knowledge Deficit - Standard  Goal: Patient and family/care givers will demonstrate understanding of plan of care, disease process/condition, diagnostic tests and medications  Outcome: Progressing     Problem: Fall Risk  Goal: Patient will remain free from falls  Outcome: Progressing     Problem: Skin Integrity  Goal: Skin integrity is maintained or improved  Outcome: Progressing     Problem: Urinary Elimination  Goal: Establish and maintain regular urinary output  Outcome: Progressing     Problem: Bowel Elimination  Goal: Establish and maintain regular bowel function  Outcome: Progressing     Problem: Mobility  Goal: Patient's capacity to carry out activities will improve  Outcome: Progressing     Problem: Respiratory  Goal: Patient will achieve/maintain optimum respiratory ventilation and gas exchange  Outcome: Progressing     Problem: Infection - Standard  Goal: Patient will remain free from infection  Outcome: Progressing     Problem: Wound/ / Incision Healing  Goal: Patient's wound/surgical incision will decrease in size and heals properly  Outcome: Progressing       Patient is not progressing towards the following goals:

## 2024-07-06 PROCEDURE — 700101 HCHG RX REV CODE 250

## 2024-07-06 PROCEDURE — 770001 HCHG ROOM/CARE - MED/SURG/GYN PRIV*

## 2024-07-06 PROCEDURE — A9270 NON-COVERED ITEM OR SERVICE: HCPCS | Performed by: NURSE PRACTITIONER

## 2024-07-06 PROCEDURE — 99231 SBSQ HOSP IP/OBS SF/LOW 25: CPT | Mod: GC | Performed by: FAMILY MEDICINE

## 2024-07-06 PROCEDURE — 700102 HCHG RX REV CODE 250 W/ 637 OVERRIDE(OP): Performed by: NURSE PRACTITIONER

## 2024-07-06 PROCEDURE — 700102 HCHG RX REV CODE 250 W/ 637 OVERRIDE(OP)

## 2024-07-06 PROCEDURE — A9270 NON-COVERED ITEM OR SERVICE: HCPCS

## 2024-07-06 RX ADMIN — ACETAMINOPHEN 1000 MG: 500 TABLET ORAL at 10:33

## 2024-07-06 RX ADMIN — LIDOCAINE 2 PATCH: 4 PATCH TOPICAL at 17:58

## 2024-07-06 RX ADMIN — ATORVASTATIN CALCIUM 40 MG: 40 TABLET, FILM COATED ORAL at 17:58

## 2024-07-06 RX ADMIN — POLYETHYLENE GLYCOL 3350 1 PACKET: 17 POWDER, FOR SOLUTION ORAL at 17:58

## 2024-07-06 RX ADMIN — SENNOSIDES AND DOCUSATE SODIUM 1 TABLET: 50; 8.6 TABLET ORAL at 22:04

## 2024-07-06 RX ADMIN — TIZANIDINE 2 MG: 4 TABLET ORAL at 10:33

## 2024-07-06 RX ADMIN — LEVOTHYROXINE SODIUM 100 MCG: 0.1 TABLET ORAL at 06:07

## 2024-07-06 ASSESSMENT — PAIN DESCRIPTION - PAIN TYPE
TYPE: ACUTE PAIN
TYPE: ACUTE PAIN
TYPE: ACUTE PAIN;SURGICAL PAIN

## 2024-07-06 NOTE — CARE PLAN
The patient is Stable - Low risk of patient condition declining or worsening    Shift Goals  Clinical Goals: mobiity; safety  Patient Goals: comfort; mobility  Family Goals: not present    Progress made toward(s) clinical / shift goals:    Problem: Pain - Standard  Goal: Alleviation of pain or a reduction in pain to the patient’s comfort goal  Outcome: Progressing     Problem: Knowledge Deficit - Standard  Goal: Patient and family/care givers will demonstrate understanding of plan of care, disease process/condition, diagnostic tests and medications  Outcome: Progressing     Problem: Fall Risk  Goal: Patient will remain free from falls  Outcome: Progressing     Problem: Skin Integrity  Goal: Skin integrity is maintained or improved  Outcome: Progressing     Problem: Urinary Elimination  Goal: Establish and maintain regular urinary output  Outcome: Progressing     Problem: Bowel Elimination  Goal: Establish and maintain regular bowel function  Outcome: Progressing     Problem: Mobility  Goal: Patient's capacity to carry out activities will improve  Outcome: Progressing     Problem: Respiratory  Goal: Patient will achieve/maintain optimum respiratory ventilation and gas exchange  Outcome: Progressing     Problem: Infection - Standard  Goal: Patient will remain free from infection  Outcome: Progressing     Problem: Wound/ / Incision Healing  Goal: Patient's wound/surgical incision will decrease in size and heals properly  Outcome: Progressing       Patient is not progressing towards the following goals:

## 2024-07-06 NOTE — PROGRESS NOTES
FAMILY MEDICINE PROGRESS NOTE          PATIENT ID:  NAME:  Victoriano Vasquez  MRN:               [id number]  YOB: 1940    Date of Admission: 6/24/2024     Attending: Jada Jade M.d.     Resident: Ramez Richardson M.D. (PGY-2)    Primary Care Physician:  Susi Gregorio M.D.    HPI: Victoriano Vasquez is a 84 y.o. male with past medical history of HLD, HTN, Hypothyroidism, was admitted on 6/24/2024 for L2 compression fracture, sent in by neurosurgery now s/p T12-L4 fusion. On hospital day 11.    Interval Problem Update  6/30: Seen by PM&R, agree with rehab placement. Pt insurance not accepted by [facility]. Recommend referrals to [facility].  7/1: Medically cleared, awaiting placement.   7/2: Pt denied by insurance, reassessing with PT and will resubmit authorization.  7/3: Insurance denied again. Discussed with CM, offered Peer to peer with insurance. Insurance agreed to review again.  7/4: Medically cleared, awaiting placement.    SUBJECTIVE:   No acute events overnight, feeling well except for pain when ambulating. Getting more mobility working with inpatient PT.    OBJECTIVE:  Temp:  [36.5 °C (97.7 °F)-36.7 °C (98.1 °F)] 36.5 °C (97.7 °F)  Pulse:  [77-85] 85  Resp:  [15-16] 16  BP: ()/(51-56) 123/56  SpO2:  [95 %-97 %] 95 %    No intake or output data in the 24 hours ending 07/05/24 1929      PHYSICAL EXAM:  Physical Exam  Vitals reviewed.   Constitutional:       General: He is not in acute distress.     Appearance: Normal appearance. He is not toxic-appearing.      Comments: Very pleasant elderly male lying in hospital bed. Answers questions appropriately.   HENT:      Head: Normocephalic and atraumatic.      Right Ear: External ear normal.      Left Ear: External ear normal.      Nose: Nose normal. No congestion.      Mouth/Throat:      Mouth: Mucous membranes are moist.      Pharynx: Oropharynx is clear.   Eyes:      Extraocular Movements: Extraocular movements intact.      Pupils: Pupils are  "equal, round, and reactive to light.   Cardiovascular:      Rate and Rhythm: Normal rate and regular rhythm.      Heart sounds: No murmur heard.  Pulmonary:      Effort: Pulmonary effort is normal. No respiratory distress.      Breath sounds: Normal breath sounds.   Abdominal:      General: Abdomen is flat. Bowel sounds are normal. There is no distension.      Palpations: Abdomen is soft.      Tenderness: There is no abdominal tenderness. There is no guarding or rebound.   Musculoskeletal:         General: No swelling or deformity.      Cervical back: Normal range of motion and neck supple.      Comments: Range of motion limited by pain.  Improved by lying still.   Skin:     General: Skin is warm and dry.      Capillary Refill: Capillary refill takes less than 2 seconds.      Findings: No rash.   Neurological:      General: No focal deficit present.      Mental Status: He is alert and oriented to person, place, and time.      Cranial Nerves: No cranial nerve deficit.   Psychiatric:         Mood and Affect: Mood normal.         Behavior: Behavior normal.           LABS:  No results for input(s): \"WBC\", \"RBC\", \"HEMOGLOBIN\", \"HEMATOCRIT\", \"MCV\", \"MCH\", \"RDW\", \"PLATELETCT\", \"MPV\", \"NEUTSPOLYS\", \"LYMPHOCYTES\", \"MONOCYTES\", \"EOSINOPHILS\", \"BASOPHILS\", \"RBCMORPHOLO\" in the last 72 hours.    No results for input(s): \"SODIUM\", \"POTASSIUM\", \"CHLORIDE\", \"CO2\", \"BUN\", \"CREATININE\", \"CALCIUM\", \"MAGNESIUM\", \"PHOSPHORUS\", \"ALBUMIN\" in the last 72 hours.    Estimated GFR/CRCL = Estimated Creatinine Clearance: 110.2 mL/min (A) (by C-G formula based on SCr of 0.46 mg/dL (L)).  No results for input(s): \"GLUCOSE\", \"POCGLUCOSE\" in the last 72 hours.                  No results for input(s): \"INR\", \"APTT\", \"DDIMER\", \"FIBRINOGEN\" in the last 72 hours.      IMAGING:  DX-PORTABLE FLUORO > 1 HOUR   Final Result      Portable fluoroscopy utilized for 7.4 seconds.         INTERPRETING LOCATION: [address] [place] [address], [place], [id number]    "   DX-THORACOLUMBAR SPINE-2 VIEWS   Final Result      Digitized intraoperative radiograph is submitted for review. This examination is not for diagnostic purpose but for guidance during a surgical procedure. Please see the patient's chart for full procedural details.         INTERPRETING LOCATION: [address], [place], [address]      DX-O-ARM   Final Result      Portable O-arm utilized for 3.23 seconds.      INTERPRETING LOCATION: [address], [place], [address]      DX-CHEST-PORTABLE (1 VIEW)   Final Result      1.  Hypoinflation without other evidence for acute cardiopulmonary disease.   2.  Evidence of old granulomatous disease.      CT-LSPINE W/O PLUS RECONS   Final Result         1. Burst fracture of L2 is again noted with severe canal stenosis.          CULTURES:   Results       Procedure Component Value Units Date/Time    URINALYSIS [[id number]]  (Abnormal) Collected: 07/01/24 1055    Order Status: Completed Specimen: Urine, Clean Catch Updated: 07/01/24 1355     Color Yellow     Character Clear     Specific Gravity 1.019     Ph 8.0     Glucose Negative mg/dL      Ketones 40 mg/dL      Protein 30 mg/dL      Bilirubin Negative     Urobilinogen, Urine 0.2     Nitrite Negative     Leukocyte Esterase Trace     Occult Blood Trace     Micro Urine Req Microscopic            MEDS:  Current Facility-Administered Medications   Medication Last Admin    acetaminophen (Tylenol) tablet 1,000 mg 1,000 mg at 07/02/24 0819    polyethylene glycol/lytes (Miralax) Packet 1 Packet 1 Packet at 07/05/24 1748    oxyCODONE immediate-release (Roxicodone) tablet 5 mg 5 mg at 07/05/24 0541    oxyCODONE immediate release (Roxicodone) tablet 10 mg      morphine 4 MG/ML injection 2 mg      Pharmacy Consult Request ...Pain Management Review 1 Each      MD ALERT...DO NOT ADMINISTER NSAIDS or ASPIRIN unless ORDERED By Neurosurgery 1 Each      senna-docusate (Pericolace Or Senokot S) 8.6-50 MG per tablet 1 Tablet 1 Tablet at 07/03/24 2026     senna-docusate (Pericolace Or Senokot S) 8.6-50 MG per tablet 1 Tablet 1 Tablet at 06/28/24 0900    polyethylene glycol/lytes (Miralax) Packet 1 Packet 1 Packet at 06/30/24 1714    magnesium hydroxide (Milk Of Magnesia) suspension 30 mL 30 mL at 07/04/24 0539    bisacodyl (Dulcolax) suppository 10 mg      sodium phosphate (Fleet) enema 133 mL      diphenhydrAMINE (Benadryl) tablet/capsule 25 mg      Or    diphenhydrAMINE (Benadryl) injection 25 mg      ondansetron (Zofran) syringe/vial injection 4 mg      ondansetron (Zofran ODT) dispertab 4 mg      tizanidine (Zanaflex) tablet 2 mg      labetalol (Normodyne/Trandate) injection 10 mg      hydrALAZINE (Apresoline) injection 10 mg      lidocaine (Asperflex) 4 % patch 2 Patch 2 Patch at 07/05/24 1454    labetalol (Normodyne/Trandate) injection 10 mg      amLODIPine (Norvasc) tablet 5 mg 5 mg at 07/03/24 0502    atorvastatin (Lipitor) tablet 40 mg 40 mg at 07/05/24 1749    levothyroxine (Synthroid) tablet 100 mcg 100 mcg at 07/05/24 0541    metoprolol SR (Toprol XL) tablet 25 mg 25 mg at 07/05/24 1748       ASSESSMENT/PLAN:  84 y.o. male admitted for   * Lumbar compression fracture, closed, initial encounter ([facility])- (present on admission)  Assessment & Plan  S/p T12-L4 fusion on 6/27 by neurosurgery. PCA pump Dc'd 6/28, pt not in sig pain. Hemovac removed 6/29.   Plan:   -  Tylenol 1000 mg q6h prn, oxycodone 5-10 mg q4h PRN, IV morphine 2 mg q3 hours prn, Tizanidine 2 mg TID PRN  - PT consulted recommend acute placement, PM&R saw patient and agreed with plan for rehab.  Patient's [facility] except[facility], recommend [facility] to [facility].  Case management working on placement   -7/2 referral sent to SNF, awaiting insurance Auth   -7/3 SNF authorization denied by insurance.  Discussed with , they state denial was due to patient being able to ambulate 150 feet, noted in PT note from 7/1.  Notes from 7/2 indicate patient is requiring increased assistance with  "ambulation secondary to pain, reiterating need for postacute placement.  Asked case management to resubmit request, offered peer to peer if needed for insurance authorization.   -7/4 No beds at SNF. PT worked with patient, still recommending post-acute placement   -7/5 Accepted by insurance, awaiting bed.  - Neurosurgery recommendations: No DVT PPx, TLSO brace when patient is up and walking.   - Fall precautions  - Regular diet  - MiraLAX BID, senna-docusate daily    Leukocytosis  Assessment & Plan  Developed a mild leukocytosis post op. Likely related to surgical procedure. Patient is otherwise asymptomatic.   - WBC trending to normal. Ok to stop trending 7/1    Poor nutrition- (present on admission)  Assessment & Plan  Nursing staff and wife report patient has poor PO intake. Patient reports he has \"moods and times when he does not want to eat\". He is amenable to trying nutrition shakes.   Plan:   - Ensure Vanilla shakes with each meal   -7/2 wife reports patient still has decreased p.o. intake.  May need further nutritional support after discharge  -7/4 wife reports PO intake is improving.    Urinary frequency  Assessment & Plan  Patient complaining of urinary frequency. Could be UTI, BPH, or overactive bladder (this is per patient).   Plan:   - UA 7/1 shows no signs of nicole infection.  Patient denying urinary symptoms.  Will hold off on treatment for now, continue to monitor    Normocytic anemia  Assessment & Plan  Hgb 13.9 on admission. Post procedure stable at 11.0.   - Hgb stable at 10.9 7/1, ok to stop daily trending  - Weekly labs, 7/8 if still inpatient.    HLD (hyperlipidemia)- (present on admission)  Assessment & Plan  Lipid panel with LDL at 138 two months ago.   -Continue home atorvastatin 40 mg.     Primary hypertension- (present on admission)  Assessment & Plan  Continue home amlodipine 5mg and metoprolol SR 25 mg daily.     Hypothyroidism- (present on admission)  Assessment & Plan  TSH at 2 months " ago of 2.480.   -Continue home levothyroxine 100 mcg.          Core Measures:  Fluids: IV push only, no IV fluids   Lines: Peripheral IV for intravenous access  Abx: None  Diet: regular diet   PPX: pharmacologic prophylaxis contraindicated due to recent surgery, per neurosurgery recs    CODE Status: Full Code      Disposition  Patient is medically cleared pending SNF/rehab placement  for discharge.   Anticipate discharge to skilled nursing facility.  I have placed the appropriate orders for post-discharge needs.    I have personally seen and examined the patient at bedside. I discussed the plan of care with patient, , and  Jada Jade M.d..      Ramez Richardson M.D.   PGY-2  [facility]

## 2024-07-06 NOTE — CARE PLAN
The patient is Stable - Low risk of patient condition declining or worsening    Shift Goals  Clinical Goals: Safety and Mobility  Patient Goals: Comfort  Family Goals: Not Present    Progress made toward(s) clinical / shift goals:    Problem: Pain - Standard  Goal: Alleviation of pain or a reduction in pain to the patient’s comfort goal  Outcome: Progressing   Pain is well managed with oral pain medications, lidocaine patches, rest and mobilization   Problem: Fall Risk  Goal: Patient will remain free from falls  Outcome: Progressing   The patient is a high fall risk, precautions are in place   Problem: Mobility  Goal: Patient's capacity to carry out activities will improve  Outcome: Progressing   The patient is mobilizing with both nursing staff and physical/ occupational therapy     Patient is not progressing towards the following goals:

## 2024-07-07 PROCEDURE — 97116 GAIT TRAINING THERAPY: CPT

## 2024-07-07 PROCEDURE — A9270 NON-COVERED ITEM OR SERVICE: HCPCS

## 2024-07-07 PROCEDURE — 700102 HCHG RX REV CODE 250 W/ 637 OVERRIDE(OP)

## 2024-07-07 PROCEDURE — 700101 HCHG RX REV CODE 250

## 2024-07-07 PROCEDURE — 97530 THERAPEUTIC ACTIVITIES: CPT

## 2024-07-07 PROCEDURE — 770001 HCHG ROOM/CARE - MED/SURG/GYN PRIV*

## 2024-07-07 PROCEDURE — 99232 SBSQ HOSP IP/OBS MODERATE 35: CPT | Mod: GC | Performed by: FAMILY MEDICINE

## 2024-07-07 PROCEDURE — A9270 NON-COVERED ITEM OR SERVICE: HCPCS | Performed by: NURSE PRACTITIONER

## 2024-07-07 PROCEDURE — 700102 HCHG RX REV CODE 250 W/ 637 OVERRIDE(OP): Performed by: NURSE PRACTITIONER

## 2024-07-07 RX ADMIN — AMLODIPINE BESYLATE 5 MG: 5 TABLET ORAL at 04:38

## 2024-07-07 RX ADMIN — METOPROLOL SUCCINATE 25 MG: 25 TABLET, EXTENDED RELEASE ORAL at 18:20

## 2024-07-07 RX ADMIN — ATORVASTATIN CALCIUM 40 MG: 40 TABLET, FILM COATED ORAL at 18:20

## 2024-07-07 RX ADMIN — POLYETHYLENE GLYCOL 3350 1 PACKET: 17 POWDER, FOR SOLUTION ORAL at 18:19

## 2024-07-07 RX ADMIN — POLYETHYLENE GLYCOL 3350 1 PACKET: 17 POWDER, FOR SOLUTION ORAL at 04:37

## 2024-07-07 RX ADMIN — LEVOTHYROXINE SODIUM 100 MCG: 0.1 TABLET ORAL at 04:37

## 2024-07-07 RX ADMIN — METOPROLOL SUCCINATE 25 MG: 25 TABLET, EXTENDED RELEASE ORAL at 04:38

## 2024-07-07 RX ADMIN — SENNOSIDES AND DOCUSATE SODIUM 1 TABLET: 50; 8.6 TABLET ORAL at 21:17

## 2024-07-07 RX ADMIN — LIDOCAINE 2 PATCH: 4 PATCH TOPICAL at 13:29

## 2024-07-07 ASSESSMENT — PAIN DESCRIPTION - PAIN TYPE
TYPE: ACUTE PAIN;SURGICAL PAIN
TYPE: ACUTE PAIN;SURGICAL PAIN

## 2024-07-07 ASSESSMENT — COGNITIVE AND FUNCTIONAL STATUS - GENERAL
CLIMB 3 TO 5 STEPS WITH RAILING: A LITTLE
TURNING FROM BACK TO SIDE WHILE IN FLAT BAD: A LITTLE
MOBILITY SCORE: 19
SUGGESTED CMS G CODE MODIFIER MOBILITY: CK
MOVING TO AND FROM BED TO CHAIR: A LITTLE
MOVING FROM LYING ON BACK TO SITTING ON SIDE OF FLAT BED: A LITTLE
WALKING IN HOSPITAL ROOM: A LITTLE

## 2024-07-07 ASSESSMENT — GAIT ASSESSMENTS
ASSISTIVE DEVICE: FRONT WHEEL WALKER
GAIT LEVEL OF ASSIST: STANDBY ASSIST
DISTANCE (FEET): 200

## 2024-07-07 NOTE — PROGRESS NOTES
FAMILY MEDICINE PROGRESS NOTE          PATIENT ID:  NAME:  Victoriano Vsaquez  MRN:               [id number]  YOB: 1940    Date of Admission: 6/24/2024     Attending: Ralph Kline M.d.     Resident: Ramez Richardson M.D. (PGY-2)    Primary Care Physician:  Susi Gregorio M.D.    HPI: Victoriano Vasquez is a 84 y.o. male with past medical history of HLD, HTN, Hypothyroidism, was admitted on 6/24/2024 for L2 compression fracture, sent in by neurosurgery now s/p T12-L4 fusion. On hospital day 12.    Interval Problem Update  6/30: Seen by PM&R, agree with rehab placement. Pt insurance not accepted by [facility]. Recommend referrals to [facility].  7/1-5: Medically cleared, awaiting placement.    SUBJECTIVE:   No acute events overnight, no concerns from patient today.  Inquiring about when he will be able to go to SNF.    OBJECTIVE:  Temp:  [36.5 °C (97.7 °F)-36.9 °C (98.4 °F)] 36.7 °C (98.1 °F)  Pulse:  [74-92] 92  Resp:  [15-17] 17  BP: (102-113)/(56-64) 113/58  SpO2:  [92 %-98 %] 97 %    No intake or output data in the 24 hours ending 07/06/24 1711      PHYSICAL EXAM:  Physical Exam  Vitals reviewed.   Constitutional:       General: He is not in acute distress.     Appearance: Normal appearance. He is not toxic-appearing.      Comments: Very pleasant elderly male sleeping in hospital bed.  Wakes appropriately.   HENT:      Head: Normocephalic and atraumatic.      Right Ear: External ear normal.      Left Ear: External ear normal.      Nose: Nose normal. No congestion.      Mouth/Throat:      Mouth: Mucous membranes are moist.      Pharynx: Oropharynx is clear.   Eyes:      Extraocular Movements: Extraocular movements intact.      Pupils: Pupils are equal, round, and reactive to light.   Cardiovascular:      Rate and Rhythm: Normal rate and regular rhythm.      Heart sounds: No murmur heard.  Pulmonary:      Effort: Pulmonary effort is normal. No respiratory distress.      Breath sounds: Normal breath sounds.  "  Abdominal:      General: Abdomen is flat. Bowel sounds are normal. There is no distension.      Palpations: Abdomen is soft.      Tenderness: There is no abdominal tenderness. There is no guarding or rebound.   Musculoskeletal:         General: No swelling or deformity.      Cervical back: Normal range of motion and neck supple.      Comments: Range of motion limited by pain.  Improved by lying still.   Skin:     General: Skin is warm and dry.      Capillary Refill: Capillary refill takes less than 2 seconds.      Findings: No rash.   Neurological:      General: No focal deficit present.      Mental Status: He is alert and oriented to person, place, and time.      Cranial Nerves: No cranial nerve deficit.   Psychiatric:         Mood and Affect: Mood normal.         Behavior: Behavior normal.           LABS:  No results for input(s): \"WBC\", \"RBC\", \"HEMOGLOBIN\", \"HEMATOCRIT\", \"MCV\", \"MCH\", \"RDW\", \"PLATELETCT\", \"MPV\", \"NEUTSPOLYS\", \"LYMPHOCYTES\", \"MONOCYTES\", \"EOSINOPHILS\", \"BASOPHILS\", \"RBCMORPHOLO\" in the last 72 hours.    No results for input(s): \"SODIUM\", \"POTASSIUM\", \"CHLORIDE\", \"CO2\", \"BUN\", \"CREATININE\", \"CALCIUM\", \"MAGNESIUM\", \"PHOSPHORUS\", \"ALBUMIN\" in the last 72 hours.    Estimated GFR/CRCL = Estimated Creatinine Clearance: 110.2 mL/min (A) (by C-G formula based on SCr of 0.46 mg/dL (L)).  No results for input(s): \"GLUCOSE\", \"POCGLUCOSE\" in the last 72 hours.                  No results for input(s): \"INR\", \"APTT\", \"DDIMER\", \"FIBRINOGEN\" in the last 72 hours.      IMAGING:  DX-PORTABLE FLUORO > 1 HOUR   Final Result      Portable fluoroscopy utilized for 7.4 seconds.         INTERPRETING LOCATION: [address] [place] [address], [facility]      DX-THORACOLUMBAR SPINE-2 VIEWS   Final Result      Digitized intraoperative radiograph is submitted for review. This examination is not for diagnostic purpose but for guidance during a surgical procedure. Please see the patient's chart for full procedural details.       "   INTERPRETING LOCATION: [address], [place], [address]      DX-O-ARM   Final Result      Portable O-arm utilized for 3.23 seconds.      INTERPRETING LOCATION: [address] [place], [id number]      DX-CHEST-PORTABLE (1 VIEW)   Final Result      1.  Hypoinflation without other evidence for acute cardiopulmonary disease.   2.  Evidence of old granulomatous disease.      CT-LSPINE W/O PLUS RECONS   Final Result         1. Burst fracture of L2 is again noted with severe canal stenosis.          CULTURES:   Results       Procedure Component Value Units Date/Time    URINALYSIS [[id number]]  (Abnormal) Collected: 07/01/24 1055    Order Status: Completed Specimen: Urine, Clean Catch Updated: 07/01/24 1355     Color Yellow     Character Clear     Specific Gravity 1.019     Ph 8.0     Glucose Negative mg/dL      Ketones 40 mg/dL      Protein 30 mg/dL      Bilirubin Negative     Urobilinogen, Urine 0.2     Nitrite Negative     Leukocyte Esterase Trace     Occult Blood Trace     Micro Urine Req Microscopic            MEDS:  Current Facility-Administered Medications   Medication Last Admin    acetaminophen (Tylenol) tablet 1,000 mg 1,000 mg at 07/06/24 1033    polyethylene glycol/lytes (Miralax) Packet 1 Packet 1 Packet at 07/05/24 1748    oxyCODONE immediate-release (Roxicodone) tablet 5 mg 5 mg at 07/05/24 0541    oxyCODONE immediate release (Roxicodone) tablet 10 mg      morphine 4 MG/ML injection 2 mg      Pharmacy Consult Request ...Pain Management Review 1 Each      MD ALERT...DO NOT ADMINISTER NSAIDS or ASPIRIN unless ORDERED By Neurosurgery 1 Each      senna-docusate (Pericolace Or Senokot S) 8.6-50 MG per tablet 1 Tablet 1 Tablet at 07/05/24 2238    senna-docusate (Pericolace Or Senokot S) 8.6-50 MG per tablet 1 Tablet 1 Tablet at 06/28/24 0900    polyethylene glycol/lytes (Miralax) Packet 1 Packet 1 Packet at 06/30/24 1714    magnesium hydroxide (Milk Of Magnesia) suspension 30 mL 30 mL at 07/04/24 0539    bisacodyl  (Dulcolax) suppository 10 mg      sodium phosphate (Fleet) enema 133 mL      diphenhydrAMINE (Benadryl) tablet/capsule 25 mg      Or    diphenhydrAMINE (Benadryl) injection 25 mg      ondansetron (Zofran) syringe/vial injection 4 mg      ondansetron (Zofran ODT) dispertab 4 mg      tizanidine (Zanaflex) tablet 2 mg 2 mg at 07/06/24 1033    labetalol (Normodyne/Trandate) injection 10 mg      hydrALAZINE (Apresoline) injection 10 mg      lidocaine (Asperflex) 4 % patch 2 Patch 2 Patch at 07/05/24 1454    labetalol (Normodyne/Trandate) injection 10 mg      amLODIPine (Norvasc) tablet 5 mg 5 mg at 07/03/24 0502    atorvastatin (Lipitor) tablet 40 mg 40 mg at 07/05/24 1749    levothyroxine (Synthroid) tablet 100 mcg 100 mcg at 07/06/24 0607    metoprolol SR (Toprol XL) tablet 25 mg 25 mg at 07/05/24 1748       ASSESSMENT/PLAN:  84 y.o. male admitted for   * Lumbar compression fracture, closed, initial encounter ([facility])- (present on admission)  Assessment & Plan  S/p T12-L4 fusion on 6/27 by neurosurgery. PCA pump Dc'd 6/28, pt not in sig pain. Hemovac removed 6/29.   Plan:   -  Tylenol 1000 mg q6h prn, oxycodone 5-10 mg q4h PRN, IV morphine 2 mg q3 hours prn, Tizanidine 2 mg TID PRN  - PT consulted recommend acute placement, PM&R saw patient and agreed with plan for rehab.  Patient's insurance not excepted by [facility], recommend referral to [facility].  Case management working on placement   -7/2 referral sent to SNF, awaiting insurance Auth   -7/3 SNF authorization denied by insurance.  Discussed with , they state denial was due to patient being able to ambulate 150 feet, noted in PT note from 7/1.  Notes from 7/2 indicate patient is requiring increased assistance with ambulation secondary to pain, reiterating need for postacute placement.  Asked case management to resubmit request, offered peer to peer if needed for insurance authorization.   -7/4 No beds at SNF. PT worked with patient, still recommending post-acute  "placement   -7/5 Accepted by insurance, awaiting bed.   -7/6 no updates from case management regarding bed availability  - Neurosurgery recommendations: No DVT PPx, TLSO brace when patient is up and walking.   - Fall precautions  - Regular diet  - MiraLAX BID, senna-docusate daily    Leukocytosis  Assessment & Plan  Developed a mild leukocytosis post op. Likely related to surgical procedure. Patient is otherwise asymptomatic.   - WBC trending to normal. Ok to stop trending 7/1    Poor nutrition- (present on admission)  Assessment & Plan  Nursing staff and wife report patient has poor PO intake. Patient reports he has \"moods and times when he does not want to eat\". He is amenable to trying nutrition shakes.   Plan:   - Ensure Vanilla shakes with each meal   -7/2 wife reports patient still has decreased p.o. intake.  May need further nutritional support after discharge  -7/4 wife reports PO intake is improving.    Urinary frequency  Assessment & Plan  Patient complaining of urinary frequency. Could be UTI, BPH, or overactive bladder (this is per patient).   Plan:   - UA 7/1 shows no signs of nicole infection.  Patient denying urinary symptoms.  Will hold off on treatment for now, continue to monitor  -No complaints as of 7/6.  Resolved.    Normocytic anemia  Assessment & Plan  Hgb 13.9 on admission. Post procedure stable at 11.0.   - Hgb stable at 10.9 7/1, ok to stop daily trending  - Weekly labs, 7/8 if still inpatient.    HLD (hyperlipidemia)- (present on admission)  Assessment & Plan  Lipid panel with LDL at 138 two months ago.   -Continue home atorvastatin 40 mg.     Primary hypertension- (present on admission)  Assessment & Plan  Continue home amlodipine 5mg and metoprolol SR 25 mg daily.     Hypothyroidism- (present on admission)  Assessment & Plan  TSH at 2 months ago of 2.480.   -Continue home levothyroxine 100 mcg.          Core Measures:  Fluids: IV push only, no IV fluids   Lines: Peripheral IV for " intravenous access  Abx: None  Diet: regular diet   PPX: pharmacologic prophylaxis contraindicated due to recent surgery, per neurosurgery recs    CODE Status: Full Code      Disposition  Patient is medically cleared pending SNF/rehab placement  for discharge.   Anticipate discharge to skilled nursing facility.  I have placed the appropriate orders for post-discharge needs.    I have personally seen and examined the patient at bedside. I discussed the plan of care with patient and  Ralph Kline M.d..      Ramez Richardson M.D.   PGY-2  [facility]

## 2024-07-07 NOTE — THERAPY
Physical Therapy   Daily Treatment     Patient Name: Victoriano Vasquez  Age:  84 y.o., Sex:  male  Medical Record #: [id number]  Today's Date: 7/7/2024     Precautions  Precautions: (P) Fall Risk;Spinal / Back Precautions   Comments: (P) TLSO EOB when OOB    Assessment    Pt is making excellent progress towards goals. Pt is likely safe to D/C home providing he does have assist when he gets home. It was mentioned that pt's spouse works a lot and it is unknown if she is available to assist at home. Pt is steady and safe with FWW. See below flowsheet for tx details.     Plan    Treatment Plan Status: Continue Current Treatment Plan  Type of Treatment: Bed Mobility, Equipment, Gait Training, Neuro Re-Education / Balance, Self Care / Home Evaluation, Stair Training, Therapeutic Activities, Therapeutic Exercise  Treatment Frequency: 5 Times per Week  Treatment Duration: Until Therapy Goals Met    DC Equipment Recommendations: (P) Front-Wheel Walker  Discharge Recommendations: (P) Recommend home health for continued physical therapy services (vs post acute due to spouse does work alot)       07/07/24 0927   Charge Group   Charges  Yes   PT Gait Training (Units) 2   PT Therapeutic Activities (Units) 1   Total Time Spent   PT Gait Training Time Spent (Mins) 25   PT Therapeutic Activities Time Spent (Mins) 15   PT Total Time Spent (Calculated) 40   Precautions   Precautions Fall Risk;Spinal / Back Precautions    Comments TLSO EOB when OOB   Vitals   O2 Delivery Device None - Room Air   Pain 0 - 10 Group   Therapist Pain Assessment 0;Nurse Notified;Post Activity Pain Same as Prior to Activity   Cognition    Cognition / Consciousness WDL   Level of Consciousness Alert   Comments noted good carry over with spine prec's, reaching back for chair, good safety with FWW and transfers.   Active ROM Lower Body    Active ROM Lower Body  WDL   Strength Lower Body   Lower Body Strength  WDL   Comments functional   Lower Body Muscle Tone   Lower  Body Muscle Tone  WDL   Sitting Lower Body Exercises   Sitting Lower Body Exercises Yes   Ankle Pumps 1 set of 10;Bilateral   Long Arc Quad 1 set of 10;Bilateral   Marching Reciprocal;1 set of 10   Other Treatments   Other Treatments Provided back prec's with good recall.   Balance   Sitting Balance (Static) Fair +   Sitting Balance (Dynamic) Fair +   Standing Balance (Static) Fair +   Standing Balance (Dynamic) Fair +   Weight Shift Sitting Good   Weight Shift Standing Good   Comments with FWW   Bed Mobility    Supine to Sit   (up in chair)   Sit to Supine Supervised   Gait Analysis   Gait Level Of Assist Standby Assist   Assistive Device Front Wheel Walker   Distance (Feet) 200   # of Times Distance was Traveled 2   # of Stairs Climbed 10   Level of Assist with Stairs Standby Assist   Skilled Intervention Verbal Cuing   Comments VC's to lift chin   Functional Mobility   Sit to Stand Standby Assist   Bed, Chair, Wheelchair Transfer Standby Assist   Comments various chair, therapeutic rest x 5 mins   6 Clicks Assessment - How much HELP from from another person do you currently need... (If the patient hasn't done an activity recently, how much help from another person do you think he/she would need if he/she tried?)   Turning from your back to your side while in a flat bed without using bedrails? 3   Moving from lying on your back to sitting on the side of a flat bed without using bedrails? 3   Moving to and from a bed to a chair (including a wheelchair)? 3   Standing up from a chair using your arms (e.g., wheelchair, or bedside chair)? 4   Walking in hospital room? 3   Climbing 3-5 steps with a railing? 3   6 clicks Mobility Score 19   Activity Tolerance   Sitting in Chair sitting in chair pre and post session   Standing 8 x 2   Short Term Goals    Short Term Goal # 1 Pt will transfer with FWW and supervision to progress function in 6 visits.   Goal Outcome # 1 Progressing as expected   Short Term Goal # 2 Pt will  ambulate 200ft with FWW and supervision to progress function in 6 visits.   Goal Outcome # 2 Progressing as expected   Short Term Goal # 3 B Pt will negotiate 5+ stairs with SPV for home entry in 6 visits.   Goal Outcome # 3 B Progressing as expected   Education Group   Spine Precautions Patient Response Patient;Acceptance;Explanation;Demonstration;Handout;Verbal Demonstration;Action Demonstration;Reinforcement Needed   Role of Physical Therapist Patient Response Patient;Acceptance;Explanation;Verbal Demonstration   Anticipated Discharge Equipment and Recommendations   DC Equipment Recommendations Front-Wheel Walker   Discharge Recommendations Recommend home health for continued physical therapy services  (vs post acute due to spouse does work alot)   Interdisciplinary Plan of Care Collaboration   IDT Collaboration with  Nursing   Patient Position at End of Therapy Seated;Chair Alarm On;Phone within Reach;Tray Table within Reach;Call Light within Reach   Collaboration Comments re; eval   Session Information   Date / Session Number  7/7-5 ( 1/5, 7/14)

## 2024-07-07 NOTE — CARE PLAN
The patient is Stable - Low risk of patient condition declining or worsening    Shift Goals  Clinical Goals: Safety, mobility  Patient Goals: Comfort, rest  Family Goals: N/A    Progress made toward(s) clinical / shift goals:    Problem: Pain - Standard  Goal: Alleviation of pain or a reduction in pain to the patient’s comfort goal  Outcome: Progressing     Problem: Knowledge Deficit - Standard  Goal: Patient and family/care givers will demonstrate understanding of plan of care, disease process/condition, diagnostic tests and medications  Outcome: Progressing     Problem: Fall Risk  Goal: Patient will remain free from falls  Outcome: Progressing     Problem: Mobility  Goal: Patient's capacity to carry out activities will improve  Outcome: Progressing       Patient is not progressing towards the following goals:

## 2024-07-07 NOTE — CARE PLAN
The patient is Stable - Low risk of patient condition declining or worsening    Shift Goals  Clinical Goals: mobiliity; shower  Patient Goals: comfort  Family Goals: not present    Progress made toward(s) clinical / shift goals:    Problem: Pain - Standard  Goal: Alleviation of pain or a reduction in pain to the patient’s comfort goal  Outcome: Progressing     Problem: Knowledge Deficit - Standard  Goal: Patient and family/care givers will demonstrate understanding of plan of care, disease process/condition, diagnostic tests and medications  Outcome: Progressing     Problem: Fall Risk  Goal: Patient will remain free from falls  Outcome: Progressing     Problem: Skin Integrity  Goal: Skin integrity is maintained or improved  Outcome: Progressing     Problem: Urinary Elimination  Goal: Establish and maintain regular urinary output  Outcome: Met     Problem: Bowel Elimination  Goal: Establish and maintain regular bowel function  Outcome: Met     Problem: Mobility  Goal: Patient's capacity to carry out activities will improve  Outcome: Progressing     Problem: Respiratory  Goal: Patient will achieve/maintain optimum respiratory ventilation and gas exchange  Outcome: Met     Problem: Infection - Standard  Goal: Patient will remain free from infection  Outcome: Progressing     Problem: Wound/ / Incision Healing  Goal: Patient's wound/surgical incision will decrease in size and heals properly  Outcome: Progressing       Patient is not progressing towards the following goals:

## 2024-07-08 PROBLEM — R35.0 URINARY FREQUENCY: Status: RESOLVED | Noted: 2024-06-30 | Resolved: 2024-07-08

## 2024-07-08 LAB
ANION GAP SERPL CALC-SCNC: 12 MMOL/L (ref 7–16)
BASOPHILS # BLD AUTO: 0.5 % (ref 0–1.8)
BASOPHILS # BLD: 0.05 K/UL (ref 0–0.12)
BUN SERPL-MCNC: 21 MG/DL (ref 8–22)
CALCIUM SERPL-MCNC: 8.5 MG/DL (ref 8.5–10.5)
CHLORIDE SERPL-SCNC: 105 MMOL/L (ref 96–112)
CO2 SERPL-SCNC: 22 MMOL/L (ref 20–33)
CREAT SERPL-MCNC: 0.58 MG/DL (ref 0.5–1.4)
EOSINOPHIL # BLD AUTO: 0.1 K/UL (ref 0–0.51)
EOSINOPHIL NFR BLD: 0.9 % (ref 0–6.9)
ERYTHROCYTE [DISTWIDTH] IN BLOOD BY AUTOMATED COUNT: 58.9 FL (ref 35.9–50)
GFR SERPLBLD CREATININE-BSD FMLA CKD-EPI: 95 ML/MIN/1.73 M 2
GLUCOSE SERPL-MCNC: 91 MG/DL (ref 65–99)
HCT VFR BLD AUTO: 32.8 % (ref 42–52)
HGB BLD-MCNC: 11.2 G/DL (ref 14–18)
IMM GRANULOCYTES # BLD AUTO: 0.09 K/UL (ref 0–0.11)
IMM GRANULOCYTES NFR BLD AUTO: 0.8 % (ref 0–0.9)
LYMPHOCYTES # BLD AUTO: 1.96 K/UL (ref 1–4.8)
LYMPHOCYTES NFR BLD: 17.9 % (ref 22–41)
MCH RBC QN AUTO: 31.1 PG (ref 27–33)
MCHC RBC AUTO-ENTMCNC: 34.1 G/DL (ref 32.3–36.5)
MCV RBC AUTO: 91.1 FL (ref 81.4–97.8)
MONOCYTES # BLD AUTO: 0.93 K/UL (ref 0–0.85)
MONOCYTES NFR BLD AUTO: 8.5 % (ref 0–13.4)
NEUTROPHILS # BLD AUTO: 7.85 K/UL (ref 1.82–7.42)
NEUTROPHILS NFR BLD: 71.4 % (ref 44–72)
NRBC # BLD AUTO: 0 K/UL
NRBC BLD-RTO: 0 /100 WBC (ref 0–0.2)
PLATELET # BLD AUTO: 404 K/UL (ref 164–446)
PMV BLD AUTO: 9.9 FL (ref 9–12.9)
POTASSIUM SERPL-SCNC: 3.4 MMOL/L (ref 3.6–5.5)
RBC # BLD AUTO: 3.6 M/UL (ref 4.7–6.1)
SODIUM SERPL-SCNC: 139 MMOL/L (ref 135–145)
WBC # BLD AUTO: 11 K/UL (ref 4.8–10.8)

## 2024-07-08 PROCEDURE — 80048 BASIC METABOLIC PNL TOTAL CA: CPT

## 2024-07-08 PROCEDURE — 770001 HCHG ROOM/CARE - MED/SURG/GYN PRIV*

## 2024-07-08 PROCEDURE — A9270 NON-COVERED ITEM OR SERVICE: HCPCS | Mod: JZ

## 2024-07-08 PROCEDURE — A9270 NON-COVERED ITEM OR SERVICE: HCPCS

## 2024-07-08 PROCEDURE — 700102 HCHG RX REV CODE 250 W/ 637 OVERRIDE(OP): Mod: JZ

## 2024-07-08 PROCEDURE — 99231 SBSQ HOSP IP/OBS SF/LOW 25: CPT | Mod: GC | Performed by: FAMILY MEDICINE

## 2024-07-08 PROCEDURE — 700102 HCHG RX REV CODE 250 W/ 637 OVERRIDE(OP)

## 2024-07-08 PROCEDURE — 700102 HCHG RX REV CODE 250 W/ 637 OVERRIDE(OP): Performed by: NURSE PRACTITIONER

## 2024-07-08 PROCEDURE — 97116 GAIT TRAINING THERAPY: CPT

## 2024-07-08 PROCEDURE — 85025 COMPLETE CBC W/AUTO DIFF WBC: CPT

## 2024-07-08 PROCEDURE — 700101 HCHG RX REV CODE 250

## 2024-07-08 PROCEDURE — A9270 NON-COVERED ITEM OR SERVICE: HCPCS | Performed by: NURSE PRACTITIONER

## 2024-07-08 PROCEDURE — 36415 COLL VENOUS BLD VENIPUNCTURE: CPT

## 2024-07-08 RX ORDER — POTASSIUM CHLORIDE 20 MEQ/1
20 TABLET, EXTENDED RELEASE ORAL ONCE
Status: COMPLETED | OUTPATIENT
Start: 2024-07-08 | End: 2024-07-08

## 2024-07-08 RX ADMIN — ATORVASTATIN CALCIUM 40 MG: 40 TABLET, FILM COATED ORAL at 16:58

## 2024-07-08 RX ADMIN — POTASSIUM CHLORIDE 20 MEQ: 1500 TABLET, EXTENDED RELEASE ORAL at 11:55

## 2024-07-08 RX ADMIN — POLYETHYLENE GLYCOL 3350 1 PACKET: 17 POWDER, FOR SOLUTION ORAL at 06:00

## 2024-07-08 RX ADMIN — AMLODIPINE BESYLATE 5 MG: 5 TABLET ORAL at 05:15

## 2024-07-08 RX ADMIN — OXYCODONE HYDROCHLORIDE 5 MG: 5 TABLET ORAL at 21:19

## 2024-07-08 RX ADMIN — LEVOTHYROXINE SODIUM 100 MCG: 0.1 TABLET ORAL at 05:15

## 2024-07-08 RX ADMIN — POLYETHYLENE GLYCOL 3350 1 PACKET: 17 POWDER, FOR SOLUTION ORAL at 05:15

## 2024-07-08 RX ADMIN — SENNOSIDES AND DOCUSATE SODIUM 1 TABLET: 50; 8.6 TABLET ORAL at 21:19

## 2024-07-08 RX ADMIN — POLYETHYLENE GLYCOL 3350 1 PACKET: 17 POWDER, FOR SOLUTION ORAL at 16:57

## 2024-07-08 RX ADMIN — METOPROLOL SUCCINATE 25 MG: 25 TABLET, EXTENDED RELEASE ORAL at 05:15

## 2024-07-08 RX ADMIN — LIDOCAINE 2 PATCH: 4 PATCH TOPICAL at 10:52

## 2024-07-08 ASSESSMENT — COGNITIVE AND FUNCTIONAL STATUS - GENERAL
STANDING UP FROM CHAIR USING ARMS: A LITTLE
SUGGESTED CMS G CODE MODIFIER MOBILITY: CK
MOBILITY SCORE: 19
CLIMB 3 TO 5 STEPS WITH RAILING: A LITTLE
WALKING IN HOSPITAL ROOM: A LITTLE
MOVING FROM LYING ON BACK TO SITTING ON SIDE OF FLAT BED: A LITTLE
MOVING TO AND FROM BED TO CHAIR: A LITTLE

## 2024-07-08 ASSESSMENT — PAIN DESCRIPTION - PAIN TYPE
TYPE: ACUTE PAIN

## 2024-07-08 ASSESSMENT — GAIT ASSESSMENTS
ASSISTIVE DEVICE: FRONT WHEEL WALKER
DISTANCE (FEET): 150
GAIT LEVEL OF ASSIST: STANDBY ASSIST
DEVIATION: BRADYKINETIC

## 2024-07-08 NOTE — DISCHARGE PLANNING
Case Management Discharge Planning    Admission Date: 6/24/2024  GMLOS: 2.8  ALOS: 14    6-Clicks ADL Score: 15  6-Clicks Mobility Score: 19  PT and/or OT Eval ordered: Yes  Post-acute Referrals Ordered: Yes  Post-acute Choice Obtained: Yes  Has referral(s) been sent to post-acute provider:  Yes    Anticipated Discharge Dispo: Discharge Disposition: D/T to SNF with Medicare cert in anticipation of skilled care (03)    DME Needed: No    Action(s) Taken: Pt was discussed during morning rounds, pt remains medically cleared to DC to post-acute placement. LMSW received report from RN CM, pt is awaiting open bed at first choice, [facility]. LMSW placed phone call to Viry with [facility], who stated that there is a potential bed available today and will call LMSW back to confirm. LMSW provided update to pt at bedside, who verbalized his understanding and is agreeable with DCP.    UPDATE 1400  DPA informed LMSW that [facility] can accept pt tomorrow at 1200 via their transportation. LMSW informed provider, pt and pt's spouse.    Escalations Completed: None    Medically Clear: Yes    Next Steps: LMSW to follow for any additional CM needs.    Barriers to Discharge: Open bed availability.    Is the patient up for discharge tomorrow: Yes    Is transport arranged for discharge disposition: Yes

## 2024-07-08 NOTE — DISCHARGE SUMMARY
"[facility] MEDICINE DISCHARGE SUMMARY     Admit Date:  6/24/2024       Discharge Date:   7/9/2024    Attending: Ralph Kline M.d.     Resident: Ramez Richardson M.D. (PGY-2)    PATIENT: Victoriano Vasquez; [id number]; 1940    Diagnoses (includes active and resolved):  Principal Problem:    Lumbar compression fracture, closed, initial encounter (HCC) (POA: Yes)  Active Problems:    Normocytic anemia (POA: No)    Poor nutrition (POA: Yes)    Leukocytosis (POA: No)    Hypothyroidism (POA: Yes)    Primary hypertension (POA: Yes)    HLD (hyperlipidemia) (POA: Yes)  Resolved Problems:    Hypokalemia (POA: Yes)    Urinary frequency (POA: No)    Delirium (POA: Clinically Undetermined)       Patient Active Problem List   Diagnosis    Abdominal pain    ACP (advance care planning)    Elevated liver enzymes    Hypothyroidism    Primary hypertension    AF (atrial fibrillation) (HCC)    Elevated bilirubin    Encounter for perioperative consultation    Acute cholecystitis    Nonrheumatic aortic valve stenosis    NSVT (nonsustained ventricular tachycardia) ([facility])    Lumbar compression fracture, closed, initial encounter ([facility])    HLD (hyperlipidemia)    Normocytic anemia    Poor nutrition    Leukocytosis        HPI by Dr. Bey on 6/24/2024:  \"Victoriano Vasquez is a 84 y.o. male who presented to the ER at the request of his outside neurosurgeon. Patient reports that he was suppose to follow up with his neurosurgeon outpatient today who advised patient be seen here as patient could not ambulate well. Patient has known L5 fracture. Patient with three days of worsening back pain, yesterday was unable to get out of bed d/t pain, today he feels somewhat better.  He reports his pain at baseline of 4 out of 10, however if he attempts to move suddenly it can be 8 out of 10.  Patient able to ambulate.     Patient experienced a fall 2 month ago as he tripped on stairs at home, and concern his back pain started; he then had a subsequent fall 1 " "months ago while visiting [place]; neither falls were associated with loss of consciousness, palpitations, dizziness, striking of the head; they seem mechanical in nature per patient report.     Patient reports no headache, fever or chills, chest pain, shortness of breath, palpitations, N/V/D, incontinence, loss of sensation or weakness in lower extremities.     ER Course:  Neurosurgery consulted and will see patient in the morning.\"    Hospital Course:  Patient admitted for worsening back pain.  CT L-spine demonstrates burst fracture of L2 with severe canal stenosis.  Neurosurgery evaluated the patient and took him for surgery, including laminectomy, facetectomy, and posterior lateral arthrodesis facetectomy of multiple levels.  See op report for details.  Patient followed an appropriate postoperative course with pain controlled with p.o. non-narcotic pain medications.  Patient had difficulty ambulating secondary to pain and deconditioning.  PT recommended postacute placement versus home health.  Due to minimal support at home, patient and family agreed that postacute placement was better for the patient. He was ambulating with assistance from PT but did not feel safe going home due to continued pain with ambulation.   He was accepted by insurance and discharged to SNF on 7/9.    Consultants:      Neurosurgery, PMNR    Procedures:        1.  Posterior thoracic 12, L1, L2, L3, L4 pedicle screw instrumentation using   Medtronic Solera screws.  2.  Use of O-arm neuronavigation for pedicle screw placement.  3.  Posterolateral arthrodesis thoracic 12, lumbar 1, 2, 3, 4 posterolateral   arthrodesis.  4.  Lumbar 1 bilateral laminectomy, total facetectomy.  5.  Lumbar 2 laminectomy and total facetectomy bilaterally.  6.  Use of intraoperative neuromonitoring.  7.  Use of modifier 22 for extensive difficulty positioning the patient on the   table using the Kendall table.  8.  Use of locally harvested morselized " autograft.  9.  Use of allograft.    Discharge Medications:        Medication Reconciliation Completed     Medication List        START taking these medications        Instructions   acetaminophen 500 MG Tabs  Commonly known as: Tylenol   Take 2 Tablets by mouth every 6 hours as needed for Mild Pain or Moderate Pain.  Dose: 1,000 mg     oxyCODONE immediate-release 5 MG Tabs  Commonly known as: Roxicodone   Take 1 Tablet by mouth every four hours as needed for Severe Pain for up to 10 days.  Dose: 5 mg     tizanidine 2 MG tablet  Commonly known as: Zanaflex   Take 1 Tablet by mouth 3 times a day as needed (muscle spasm).  Dose: 2 mg            CONTINUE taking these medications        Instructions   amLODIPine 5 MG Tabs  Commonly known as: Norvasc   Take 5 mg by mouth every day.  Dose: 5 mg     atorvastatin 40 MG Tabs  Commonly known as: Lipitor   Take 40 mg by mouth every evening.  Dose: 40 mg     levothyroxine 100 MCG Tabs  Commonly known as: Synthroid   Take 100 mcg by mouth every morning on an empty stomach.  Dose: 100 mcg     metoprolol SR 25 MG Tb24  Commonly known as: Toprol XL   Take 25 mg by mouth 2 times a day.  Dose: 25 mg     sulfamethoxazole-trimethoprim 800-160 MG tablet  Commonly known as: Bactrim DS   Take 1 Tablet by mouth 2 times a day. X 14 days  Dose: 1 Tablet     traMADol 50 MG Tabs  Commonly known as: Ultram   Take 50 mg by mouth every 6 hours as needed for Mild Pain.  Dose: 50 mg              Discharge Criteria: The patient met 2-midnight criteria for an inpatient stay at the time of discharge.    Disposition:  to skilled nursing facility    Diet:  regular diet    Activity:  Activity as tolerated. Physical therapy as prescribed. Occupational therapy as prescribed.    Code Status: Full Code      PCP: Susi Gregorio M.D.    Follow Up:  [facility] [address] [address]  [phone]        Ebenezer Christensen M.D.  [address] [facility]  [address]  [phone]    Follow up in 1 week(s)      Susi Gregorio M.D.  [address] [facility] [address] [facility] [address]  [address]  [place] [address]  [phone]    Follow up in 1 week(s)       Future Appointments   Date Time Provider Department Center   11/11/2024  9:15 AM [facility] EXAM 10 ECHO [facility]   11/25/2024 11:15 AM [facility] None        Instructions:       The patient was instructed to return to the ER in the event of worsening symptoms. I have counseled the patient on the importance of compliance and the patient has agreed to proceed with all medical recommendations and follow up plan indicated above.   The patient understands that all medications come with benefits and risks. Risks may include permanent injury or death and these risks can be minimized with close reassessment and monitoring.            #########################################################    24 Hour Events: No acute events overnight, pt states his pain is worse today when moving. States he would like to go home but knows he may not do well if he cannot ambulate without assistance. Agreeable with plan to go to SNF today.     OBJECTIVE:     Vitals:    07/08/24 1625 07/08/24 2012 07/09/24 0339 07/09/24 0733   BP: 95/52 97/58 115/56 109/58   Pulse: 78 77 70 74   Resp: 17 16 16 16   Temp: 36.9 °C (98.4 °F) 36.7 °C (98.1 °F) 36.6 °C (97.9 °F) 36.7 °C (98.1 °F)   TempSrc: Temporal Temporal Temporal Temporal   SpO2: 94% 97% 95% 97%   Weight:       Height:         No intake or output data in the 24 hours ending 07/09/24 1206    Physical Exam  Vitals reviewed.   Constitutional:       General: He is not in acute distress.     Appearance: Normal appearance. He is not toxic-appearing.      Comments: Very pleasant elderly male lying in hospital bed. In pain, but friendly and interactive.   HENT:      Head: Normocephalic and atraumatic.      Right Ear: External ear normal.      Left Ear: External ear normal.      Nose: Nose normal. No  "congestion.      Mouth/Throat:      Mouth: Mucous membranes are moist.      Pharynx: Oropharynx is clear.   Eyes:      Extraocular Movements: Extraocular movements intact.      Pupils: Pupils are equal, round, and reactive to light.   Cardiovascular:      Rate and Rhythm: Normal rate and regular rhythm.      Heart sounds: No murmur heard.  Pulmonary:      Effort: Pulmonary effort is normal. No respiratory distress.      Breath sounds: Normal breath sounds.   Abdominal:      General: Abdomen is flat. Bowel sounds are normal. There is no distension.      Palpations: Abdomen is soft.      Tenderness: There is no abdominal tenderness. There is no guarding or rebound.   Musculoskeletal:         General: No swelling or deformity.      Cervical back: Normal range of motion and neck supple.      Comments: Range of motion limited by pain.  Improved by lying still.   Skin:     General: Skin is warm and dry.      Capillary Refill: Capillary refill takes less than 2 seconds.      Findings: No rash.   Neurological:      General: No focal deficit present.      Mental Status: He is alert and oriented to person, place, and time.      Cranial Nerves: No cranial nerve deficit.   Psychiatric:         Mood and Affect: Mood normal.         Behavior: Behavior normal.         LABS:  Recent Labs     07/08/24 0108   WBC 11.0*   RBC 3.60*   HEMOGLOBIN 11.2*   HEMATOCRIT 32.8*   MCV 91.1   MCH 31.1   RDW 58.9*   PLATELETCT 404   MPV 9.9   NEUTSPOLYS 71.40   LYMPHOCYTES 17.90*   MONOCYTES 8.50   EOSINOPHILS 0.90   BASOPHILS 0.50     Recent Labs     07/08/24 0108 07/09/24  0215   SODIUM 139 137   POTASSIUM 3.4* 4.4   CHLORIDE 105 106   CO2 22 20   BUN 21 20   CREATININE 0.58 0.59   CALCIUM 8.5 8.3*     Estimated GFR/CRCL = Estimated Creatinine Clearance: 85.9 mL/min (by C-G formula based on SCr of 0.59 mg/dL).  Recent Labs     07/08/24 0108 07/09/24  0215   GLUCOSE 91 85                 No results for input(s): \"INR\", \"APTT\", \"DDIMER\", " "\"FIBRINOGEN\" in the last 72 hours.    MICROBIOLOGY:   Results       Procedure Component Value Units Date/Time    URINALYSIS [[id number]]  (Abnormal) Collected: 07/01/24 1055    Order Status: Completed Specimen: Urine, Clean Catch Updated: 07/01/24 1355     Color Yellow     Character Clear     Specific Gravity 1.019     Ph 8.0     Glucose Negative mg/dL      Ketones 40 mg/dL      Protein 30 mg/dL      Bilirubin Negative     Urobilinogen, Urine 0.2     Nitrite Negative     Leukocyte Esterase Trace     Occult Blood Trace     Micro Urine Req Microscopic    URINALYSIS CULTURE, IF INDICATED [[id number]]  (Abnormal) Collected: 06/27/24 1405    Order Status: Completed Specimen: Urine, Clean Catch Updated: 06/27/24 2215     Color Yellow     Character Clear     Specific Gravity 1.025     Ph 5.0     Glucose Negative mg/dL      Ketones 15 mg/dL      Protein Negative mg/dL      Bilirubin Negative     Urobilinogen, Urine 0.2     Nitrite Negative     Leukocyte Esterase Negative     Occult Blood Trace     Micro Urine Req Microscopic              IMAGING:   DX-PORTABLE FLUORO > 1 HOUR   Final Result      Portable fluoroscopy utilized for 7.4 seconds.         [facility] [place] [address]      DX-THORACOLUMBAR SPINE-2 VIEWS   Final Result      Digitized intraoperative radiograph is submitted for review. This examination is not for diagnostic purpose but for guidance during a surgical procedure. Please see the patient's chart for full procedural details.         INTERPRETING LOCATION: [address] [facility], [place], [address]      DX-O-ARM   Final Result      Portable O-arm utilized for 3.23 seconds.      INTERPRETING LOCATION: [address] , [place], [address]      DX-CHEST-PORTABLE (1 VIEW)   Final Result      1.  Hypoinflation without other evidence for acute cardiopulmonary disease.   2.  Evidence of old granulomatous disease.      CT-LSPINE W/O PLUS RECONS   Final Result         1. Burst fracture of L2 is again noted with " severe canal stenosis.            Ramez Richardson M.D.   PGY-2  [facility]

## 2024-07-08 NOTE — PROGRESS NOTES
FAMILY MEDICINE MEDICAL STUDENT PROGRESS NOTE          PATIENT ID:  NAME:  Victoriano Vasquez  MRN:               [id number]  YOB: 1940    Date of Admission: 6/24/2024     Attending: Ralph Kline M.d.     STUDENT: Xavi Hall    Primary Care Physician:  Susi Gregorio M.D.    ID: Victoriano Vasquez is a 84 y.o. male with PMH of HLD, HTN, Hypothyroid admitted for L2 compression fracture on hospital day 14    Interval Problem Update  6/27: Spinal fusion procedure T12-L4.   7/1-8: medically cleared, awaiting placement in a skilled nursing facility.  7/7: seen by PT and reported to also be safe to D/C to home    SUBJECTIVE:   No acute events overnight, Patient reports dull pain as 3-4 in severity located along his back that is reported to have remained much the same through the last couple days with it increasing to a 5-6 in severity during movement. Pt reports pain as manageable without additional medication. Currently only taking lidocaine patches as his only analgesic. Patent reports a positive mood and is eager to get home. Pt denies urinary symptoms, nausea, vomiting, diarrhea, SOB,or leg pain.    OBJECTIVE:  Temp:  [36.2 °C (97.2 °F)-36.7 °C (98.1 °F)] 36.2 °C (97.2 °F)  Pulse:  [67-90] 74  Resp:  [16-17] 17  BP: (104-118)/(51-70) 112/70  SpO2:  [94 %-97 %] 97 %    No intake or output data in the 24 hours ending 07/08/24 1353    PHYSICAL EXAM:  Vitals reviewed.   Constitutional:       General: He is not in acute distress.     Appearance: Normal appearance. He is not toxic-appearing.      Comments: Elderly male lying in hospital bed. Answers questions appropriately. Slight language barrier as Khmer is preferred.   HENT:      Head: Normocephalic and atraumatic.      Right Ear: External ear normal.      Left Ear: External ear normal.      Nose: Nose normal. No congestion.      Mouth/Throat:      Mouth: Mucous membranes are moist.      Pharynx: Oropharynx is clear.   Eyes:      Extraocular  "Movements: Extraocular movements intact.      Pupils: Pupils are equal, round, and reactive to light.   Cardiovascular:      Rate and Rhythm: Normal rate and regular rhythm.      Heart sounds: No murmur heard.  Pulmonary:      Effort: Pulmonary effort is normal. No respiratory distress.      Breath sounds: Normal breath sounds.   Abdominal:      General: Abdomen is flat. Bowel sounds are normal. There is no distension.      Palpations: Abdomen is soft.      Tenderness: There is no abdominal tenderness. There is no guarding or rebound.   Musculoskeletal:         General: No swelling or deformity.      Cervical back: Normal range of motion and neck supple.      Comments: Range of motion limited by pain.  Improved by lying still.   Skin:     General: Skin is warm and dry.      Capillary Refill: Capillary refill takes less than 2 seconds.      Findings: No rash.   Neurological:      General: No focal deficit present.      Mental Status: He is alert and oriented to person, place, and time.      Cranial Nerves: No cranial nerve deficit.   Psychiatric:         Mood and Affect: Mood normal.         Behavior: Behavior normal.        LABS:  Recent Labs     07/08/24  0108   WBC 11.0*   RBC 3.60*   HEMOGLOBIN 11.2*   HEMATOCRIT 32.8*   MCV 91.1   MCH 31.1   RDW 58.9*   PLATELETCT 404   MPV 9.9   NEUTSPOLYS 71.40   LYMPHOCYTES 17.90*   MONOCYTES 8.50   EOSINOPHILS 0.90   BASOPHILS 0.50     Recent Labs     07/08/24  0108   SODIUM 139   POTASSIUM 3.4*   CHLORIDE 105   CO2 22   BUN 21   CREATININE 0.58   CALCIUM 8.5     Estimated GFR/CRCL = Estimated Creatinine Clearance: 87.4 mL/min (by C-G formula based on SCr of 0.58 mg/dL).  Recent Labs     07/08/24  0108   GLUCOSE 91                 No results for input(s): \"INR\", \"APTT\", \"FIBRINOGEN\" in the last 72 hours.    Invalid input(s): \"DIMER\"      IMAGING:  DX-PORTABLE FLUORO > 1 HOUR   Final Result      Portable fluoroscopy utilized for 7.4 seconds.         INTERPRETING LOCATION: " [address], [place], [address]      DX-THORACOLUMBAR SPINE-2 VIEWS   Final Result      Digitized intraoperative radiograph is submitted for review. This examination is not for diagnostic purpose but for guidance during a surgical procedure. Please see the patient's chart for full procedural details.         INTERPRETING LOCATION: [address], [place], [address]      DX-O-ARM   Final Result      Portable O-arm utilized for 3.23 seconds.      INTERPRETING LOCATION: [address], [place], [address]      DX-CHEST-PORTABLE (1 VIEW)   Final Result      1.  Hypoinflation without other evidence for acute cardiopulmonary disease.   2.  Evidence of old granulomatous disease.      CT-LSPINE W/O PLUS RECONS   Final Result         1. Burst fracture of L2 is again noted with severe canal stenosis.          CULTURES:   Results       Procedure Component Value Units Date/Time    URINALYSIS [[id number]]  (Abnormal) Collected: 07/01/24 1055    Order Status: Completed Specimen: Urine, Clean Catch Updated: 07/01/24 1355     Color Yellow     Character Clear     Specific Gravity 1.019     Ph 8.0     Glucose Negative mg/dL      Ketones 40 mg/dL      Protein 30 mg/dL      Bilirubin Negative     Urobilinogen, Urine 0.2     Nitrite Negative     Leukocyte Esterase Trace     Occult Blood Trace     Micro Urine Req Microscopic            MEDS:  Current Facility-Administered Medications   Medication Last Admin    acetaminophen (Tylenol) tablet 1,000 mg 1,000 mg at 07/06/24 1033    polyethylene glycol/lytes (Miralax) Packet 1 Packet 1 Packet at 07/08/24 0600    oxyCODONE immediate-release (Roxicodone) tablet 5 mg 5 mg at 07/05/24 0541    oxyCODONE immediate release (Roxicodone) tablet 10 mg      morphine 4 MG/ML injection 2 mg      Pharmacy Consult Request ...Pain Management Review 1 Each      MD ALERT...DO NOT ADMINISTER NSAIDS or ASPIRIN unless ORDERED By Neurosurgery 1 Each      senna-docusate (Pericolace Or Senokot S) 8.6-50 MG per tablet 1 Tablet 1  Tablet at 07/07/24 2117    senna-docusate (Pericolace Or Senokot S) 8.6-50 MG per tablet 1 Tablet 1 Tablet at 06/28/24 0900    polyethylene glycol/lytes (Miralax) Packet 1 Packet 1 Packet at 07/08/24 0515    magnesium hydroxide (Milk Of Magnesia) suspension 30 mL 30 mL at 07/04/24 0539    bisacodyl (Dulcolax) suppository 10 mg      sodium phosphate (Fleet) enema 133 mL      diphenhydrAMINE (Benadryl) tablet/capsule 25 mg      Or    diphenhydrAMINE (Benadryl) injection 25 mg      ondansetron (Zofran) syringe/vial injection 4 mg      ondansetron (Zofran ODT) dispertab 4 mg      tizanidine (Zanaflex) tablet 2 mg 2 mg at 07/06/24 1033    labetalol (Normodyne/Trandate) injection 10 mg      hydrALAZINE (Apresoline) injection 10 mg      lidocaine (Asperflex) 4 % patch 2 Patch 2 Patch at 07/08/24 1052    labetalol (Normodyne/Trandate) injection 10 mg      amLODIPine (Norvasc) tablet 5 mg 5 mg at 07/08/24 0515    atorvastatin (Lipitor) tablet 40 mg 40 mg at 07/07/24 1820    levothyroxine (Synthroid) tablet 100 mcg 100 mcg at 07/08/24 0515    metoprolol SR (Toprol XL) tablet 25 mg 25 mg at 07/08/24 0515       ASSESSMENT/PLAN:  84 y.o. male admitted for   * Lumbar compression fracture, closed, initial encounter ([facility])- (present on admission)  Assessment & Plan  S/p T12-L4 fusion on 6/27 by neurosurgery. PCA pump Dc'd 6/28, pt not in sig pain.  Plan:   -  Tylenol 1000 mg q6h prn, oxycodone 5-10 mg q4h PRN, IV morphine 2 mg q3 hours prn, Tizanidine 2 mg TID PRN  - PT consulted and cleared for transport to acute [facility], PM&R saw patient and agreed with plan for rehab.         - 7/1-8 transport was delayed due to multiple complication with insurance and avalibility in SNF.   - Neurosurgery recommendations: No DVT PPx, TLSO brace when patient is up and walking, fall risk.  - MiraLAX BID, senna-docusate daily for regality.      Leukocytosis  Assessment & Plan  Developed mild leukocytosis post op, however due to lack of symptoms  and tempeture being within reference range unlikely to be due to infection. Likely due to systemic inflammation post surgery.  - 7/1 WBC trending stopped after showing repeated normal values.     Poor nutrition- (present on admission)  Assessment & Plan  Nursing and Pt's wife report low oral intake. Pt is not worried when asked but does report that his apatite varies based on his mood.   - Ensure shakes added to each meal to increase caloric intake.      Urinary frequency - Resolved  Assessment & Plan  Patient complaining of urinary frequency. Could be UTI, BPH, or overactive bladder (this is per patient).   Plan:   - UA 7/1 shows no signs of nicole infection.    - 7/8 patient reports no urinary symptoms, issues seam to resolve     Normocytic anemia   Assessment & Plan  Hgb 13.9 on admission. Post procedure stable at 11.0. Could be anemia of chronic disease, or impaired hemoglobin production.   - 7/1 Hgb stable at 10.9, ok to stop daily trending  - 7/8 Hgb has increased to 11.2. Continue weekly trending.     HLD (hyperlipidemia)- (present on admission)  Assessment & Plan  - On discharge Continue home atorvastatin 40 mg.      Primary hypertension- (present on admission)  Assessment & Plan  - On discharge Continue home amlodipine 5mg and metoprolol SR 25 mg daily.      Hypothyroidism- (present on admission)  Assessment & Plan  TSH at 2 months ago of 2.480.   -On discharge Continue home levothyroxine 100 mcg.     Core Measures:  Fluids: No IV fluids  Lines: Peripheral IV  Abx: none  Diet: regular  PPX: pharmacologic prophylaxis contraindicated due to surgery    CODE Status: Full Code      Disposition  Patient is medically cleared for discharge.   Anticipate discharge to to skilled nursing facility.  I have placed the appropriate orders for post-discharge needs.      Xavi Hall, Student, [employer]    I have personally seen and examined the patient at bedside. I discussed the plan of care with  Dr. Kline.

## 2024-07-08 NOTE — CARE PLAN
The patient is Stable - Low risk of patient condition declining or worsening    Shift Goals  Clinical Goals: q4 neuro, safety  Patient Goals: rest  Family Goals: MEET    Progress made toward(s) clinical / shift goals:    Problem: Pain - Standard  Goal: Alleviation of pain or a reduction in pain to the patient’s comfort goal  Outcome: Not Progressing     Problem: Knowledge Deficit - Standard  Goal: Patient and family/care givers will demonstrate understanding of plan of care, disease process/condition, diagnostic tests and medications  Outcome: Not Progressing     Problem: Fall Risk  Goal: Patient will remain free from falls  Outcome: Not Progressing     Problem: Skin Integrity  Goal: Skin integrity is maintained or improved  Outcome: Not Progressing     Problem: Mobility  Goal: Patient's capacity to carry out activities will improve  Outcome: Not Progressing     Problem: Infection - Standard  Goal: Patient will remain free from infection  Outcome: Not Progressing     Problem: Wound/ / Incision Healing  Goal: Patient's wound/surgical incision will decrease in size and heals properly  Outcome: Not Progressing

## 2024-07-08 NOTE — DISCHARGE PLANNING
0817  Agency/Facility Name: [facility]  Outcome: DPA called regarding bed availability. Left VM with name and callback number.

## 2024-07-08 NOTE — THERAPY
"Physical Therapy   Daily Treatment     Patient Name: Victoriano Vasquez  Age:  84 y.o., Sex:  male  Medical Record #: [id number]  Today's Date: 7/8/2024     Precautions  Precautions: Fall Risk;Spinal / Back Precautions ;TLSO (Thoracolumbosacral orthosis)    Assessment    Pt received in bed and agreeable to PT session. Pt continues to require intermittent CGA to min A for mobility using the FWW for support. Pt reports needing to navigate stairs in his him to the bedroom and that he is alone for a good part of the day while his wife works. Pt will need to continue to progress to independent with FWW prior to return home, so at this time post-acute is recommended. Will follow for acute PT.    Plan    Treatment Plan Status: Continue Current Treatment Plan  Type of Treatment: Bed Mobility, Equipment, Gait Training, Neuro Re-Education / Balance, Self Care / Home Evaluation, Stair Training, Therapeutic Activities, Therapeutic Exercise  Treatment Frequency: 5 Times per Week  Treatment Duration: Until Therapy Goals Met    DC Equipment Recommendations: Unable to determine at this time  Discharge Recommendations: Recommend post-acute placement for additional physical therapy services prior to discharge home    Subjective    \"My wife, she works a lot\"     Objective       07/08/24 0933   Precautions   Precautions Fall Risk;Spinal / Back Precautions ;TLSO (Thoracolumbosacral orthosis)   Vitals   O2 Delivery Device None - Room Air   Pain 0 - 10 Group   Therapist Pain Assessment Post Activity Pain Same as Prior to Activity;Nurse Notified;1   Cognition    Level of Consciousness Alert   Comments Pleasant and cooperative, continues to improve with safety awreness and recall of precautions.   Balance   Sitting Balance (Static) Fair +   Sitting Balance (Dynamic) Fair +   Standing Balance (Static) Fair   Standing Balance (Dynamic) Fair   Weight Shift Sitting Good   Weight Shift Standing Good   Skilled Intervention Verbal Cuing;Compensatory " Strategies   Comments with FWW, cues for upright posture   Bed Mobility    Supine to Sit Standby Assist   Sit to Supine Minimal Assist   Scooting Standby Assist   Rolling Standby Assist   Skilled Intervention Verbal Cuing   Comments log roll   Gait Analysis   Gait Level Of Assist Standby Assist   Assistive Device Front Wheel Walker   Distance (Feet) 150   # of Times Distance was Traveled 2   Deviation Bradykinetic   # of Stairs Climbed 5   Level of Assist with Stairs Contact Guard Assist   Functional Mobility   Sit to Stand Contact Guard Assist   Bed, Chair, Wheelchair Transfer Standby Assist   Skilled Intervention Verbal Cuing   6 Clicks Assessment - How much HELP from from another person do you currently need... (If the patient hasn't done an activity recently, how much help from another person do you think he/she would need if he/she tried?)   Turning from your back to your side while in a flat bed without using bedrails? 4   Moving from lying on your back to sitting on the side of a flat bed without using bedrails? 3   Moving to and from a bed to a chair (including a wheelchair)? 3   Standing up from a chair using your arms (e.g., wheelchair, or bedside chair)? 3   Walking in hospital room? 3   Climbing 3-5 steps with a railing? 3   6 clicks Mobility Score 19   Short Term Goals    Short Term Goal # 1 Pt will transfer with FWW and supervision to progress function in 6 visits.   Goal Outcome # 1 Progressing as expected   Short Term Goal # 2 Pt will ambulate 200ft with FWW and supervision to progress function in 6 visits.   Goal Outcome # 2 Progressing as expected   Short Term Goal # 3 Pt will negotiate 5+ stairs with min A for home entry in 6 visits.   Goal Outcome # 3 Goal met, new goal added   Short Term Goal # 3 B Pt will negotiate 5+ stairs with SPV for home entry in 6 visits.   Goal Outcome # 3 B Progressing as expected   Physical Therapy Treatment Plan   Physical Therapy Treatment Plan Continue Current  Treatment Plan   Anticipated Discharge Equipment and Recommendations   DC Equipment Recommendations Unable to determine at this time   Discharge Recommendations Recommend post-acute placement for additional physical therapy services prior to discharge home   Interdisciplinary Plan of Care Collaboration   IDT Collaboration with  Nursing;   Patient Position at End of Therapy In Bed;Bed Alarm On;Call Light within Reach;Tray Table within Reach;Phone within Reach   Collaboration Comments RN updated   Session Information   Date / Session Number  7/8-6 (2/5, 7/11)

## 2024-07-08 NOTE — PROGRESS NOTES
Assessment completed.  Pt A&Ox4.  Pt denies any pain at this time. POC discussed: awaiting placement; communication board updated.    Bed in locked, lowest position.  Call light and belongings within reach.  Needs met.

## 2024-07-08 NOTE — PROGRESS NOTES
FAMILY MEDICINE PROGRESS NOTE          PATIENT ID:  NAME:  Victoriano Vasquez  MRN:               [id number]  YOB: 1940    Date of Admission: 6/24/2024     Attending: Ralph Kline M.d.     Resident: Leslee Prado MD (PGY3)     Primary Care Physician:  Susi Gregorio M.D.    ID: Victoriano Vasquez is a 84 y.o. male with past medical history of HLD, HTN, Hypothyroidism, was admitted on 6/24/2024 for L2 compression fracture, sent in by neurosurgery now s/p T12-L4 fusion. On hospital day 14.    Interval Problem Update  6/30: Seen by PM&R, agree with rehab placement. Pt insurance not accepted by [facility]. Recommend referrals to [facility].  7/1: Medically cleared, awaiting placement.   7/2: Pt denied by insurance, reassessing with PT and will resubmit authorization.  7/3: Insurance denied again. Discussed with CM, offered Peer to peer with insurance. Insurance agreed to review again.  7/4: Medically cleared, awaiting placement.    SUBJECTIVE:   No acute events overnight, feeling well except for pain when ambulating. Getting more mobility working with inpatient PT.  Scheduled to see patient today.     OBJECTIVE:  Temp:  [36.5 °C (97.7 °F)-36.7 °C (98.1 °F)] 36.7 °C (98.1 °F)  Pulse:  [73-92] 73  Resp:  [16-17] 16  BP: (104-118)/(53-64) 104/53  SpO2:  [94 %-97 %] 96 %    No intake or output data in the 24 hours ending 07/08/24 0103    Physical Exam  Vitals reviewed.   Constitutional:       General: He is not in acute distress.     Appearance: Normal appearance. He is not toxic-appearing.      Comments: Very pleasant elderly male lying in hospital bed. Answers questions appropriately.   HENT:      Head: Normocephalic and atraumatic.      Right Ear: External ear normal.      Left Ear: External ear normal.      Nose: Nose normal. No congestion.      Mouth/Throat:      Mouth: Mucous membranes are moist.      Pharynx: Oropharynx is clear.   Eyes:      Extraocular Movements: Extraocular movements intact.      Pupils:  "Pupils are equal, round, and reactive to light.   Cardiovascular:      Rate and Rhythm: Normal rate and regular rhythm.      Heart sounds: No murmur heard.  Pulmonary:      Effort: Pulmonary effort is normal. No respiratory distress.      Breath sounds: Normal breath sounds.   Abdominal:      General: Abdomen is flat. Bowel sounds are normal. There is no distension.      Palpations: Abdomen is soft.      Tenderness: There is no abdominal tenderness. There is no guarding or rebound.   Musculoskeletal:         General: No swelling or deformity.      Cervical back: Normal range of motion and neck supple.      Comments: Range of motion limited by pain.  Improved by lying still.   Skin:     General: Skin is warm and dry.      Capillary Refill: Capillary refill takes less than 2 seconds.      Findings: No rash.   Neurological:      General: No focal deficit present.      Mental Status: He is alert and oriented to person, place, and time.      Cranial Nerves: No cranial nerve deficit.   Psychiatric:         Mood and Affect: Mood normal.         Behavior: Behavior normal.       LABS:  No results for input(s): \"WBC\", \"RBC\", \"HEMOGLOBIN\", \"HEMATOCRIT\", \"MCV\", \"MCH\", \"RDW\", \"PLATELETCT\", \"MPV\", \"NEUTSPOLYS\", \"LYMPHOCYTES\", \"MONOCYTES\", \"EOSINOPHILS\", \"BASOPHILS\", \"RBCMORPHOLO\" in the last 72 hours.    No results for input(s): \"SODIUM\", \"POTASSIUM\", \"CHLORIDE\", \"CO2\", \"BUN\", \"CREATININE\", \"CALCIUM\", \"MAGNESIUM\", \"PHOSPHORUS\", \"ALBUMIN\" in the last 72 hours.    Estimated GFR/CRCL = CrCl cannot be calculated (Patient's most recent lab result is older than the maximum 7 days allowed.).  No results for input(s): \"GLUCOSE\", \"POCGLUCOSE\" in the last 72 hours.                  No results for input(s): \"INR\", \"APTT\", \"DDIMER\", \"FIBRINOGEN\" in the last 72 hours.    IMAGING:  DX-PORTABLE FLUORO > 1 HOUR   Final Result      Portable fluoroscopy utilized for 7.4 seconds.         INTERPRETING LOCATION: [address] [place] [address], [address]    "   DX-THORACOLUMBAR SPINE-2 VIEWS   Final Result      Digitized intraoperative radiograph is submitted for review. This examination is not for diagnostic purpose but for guidance during a surgical procedure. Please see the patient's chart for full procedural details.         INTERPRETING LOCATION: [address], [place], [address]      DX-O-ARM   Final Result      Portable O-arm utilized for 3.23 seconds.      INTERPRETING LOCATION: [address], [place], [address]      DX-CHEST-PORTABLE (1 VIEW)   Final Result      1.  Hypoinflation without other evidence for acute cardiopulmonary disease.   2.  Evidence of old granulomatous disease.      CT-LSPINE W/O PLUS RECONS   Final Result         1. Burst fracture of L2 is again noted with severe canal stenosis.          CULTURES:   Results       Procedure Component Value Units Date/Time    URINALYSIS [[id number]]  (Abnormal) Collected: 07/01/24 1055    Order Status: Completed Specimen: Urine, Clean Catch Updated: 07/01/24 1355     Color Yellow     Character Clear     Specific Gravity 1.019     Ph 8.0     Glucose Negative mg/dL      Ketones 40 mg/dL      Protein 30 mg/dL      Bilirubin Negative     Urobilinogen, Urine 0.2     Nitrite Negative     Leukocyte Esterase Trace     Occult Blood Trace     Micro Urine Req Microscopic          MEDS:  Current Facility-Administered Medications   Medication Last Admin    acetaminophen (Tylenol) tablet 1,000 mg 1,000 mg at 07/06/24 1033    polyethylene glycol/lytes (Miralax) Packet 1 Packet 1 Packet at 07/07/24 1819    oxyCODONE immediate-release (Roxicodone) tablet 5 mg 5 mg at 07/05/24 0541    oxyCODONE immediate release (Roxicodone) tablet 10 mg      morphine 4 MG/ML injection 2 mg      Pharmacy Consult Request ...Pain Management Review 1 Each      MD ALERT...DO NOT ADMINISTER NSAIDS or ASPIRIN unless ORDERED By Neurosurgery 1 Each      senna-docusate (Pericolace Or Senokot S) 8.6-50 MG per tablet 1 Tablet 1 Tablet at 07/07/24 6318     senna-docusate (Pericolace Or Senokot S) 8.6-50 MG per tablet 1 Tablet 1 Tablet at 06/28/24 0900    polyethylene glycol/lytes (Miralax) Packet 1 Packet 1 Packet at 06/30/24 1714    magnesium hydroxide (Milk Of Magnesia) suspension 30 mL 30 mL at 07/04/24 0539    bisacodyl (Dulcolax) suppository 10 mg      sodium phosphate (Fleet) enema 133 mL      diphenhydrAMINE (Benadryl) tablet/capsule 25 mg      Or    diphenhydrAMINE (Benadryl) injection 25 mg      ondansetron (Zofran) syringe/vial injection 4 mg      ondansetron (Zofran ODT) dispertab 4 mg      tizanidine (Zanaflex) tablet 2 mg 2 mg at 07/06/24 1033    labetalol (Normodyne/Trandate) injection 10 mg      hydrALAZINE (Apresoline) injection 10 mg      lidocaine (Asperflex) 4 % patch 2 Patch 2 Patch at 07/07/24 1329    labetalol (Normodyne/Trandate) injection 10 mg      amLODIPine (Norvasc) tablet 5 mg 5 mg at 07/07/24 0438    atorvastatin (Lipitor) tablet 40 mg 40 mg at 07/07/24 1820    levothyroxine (Synthroid) tablet 100 mcg 100 mcg at 07/07/24 0437    metoprolol SR (Toprol XL) tablet 25 mg 25 mg at 07/07/24 1820       ASSESSMENT/PLAN:  84 y.o. male admitted for   * Lumbar compression fracture, closed, initial encounter ([facility])- (present on admission)  Assessment & Plan  S/p T12-L4 fusion on 6/27 by neurosurgery. PCA pump Dc'd 6/28, pt not in sig pain. Hemovac removed 6/29.   Plan:   -  Tylenol 1000 mg q6h prn, oxycodone 5-10 mg q4h PRN, IV morphine 2 mg q3 hours prn, Tizanidine 2 mg TID PRN  - PT consulted recommend acute placement, PM&R saw patient and agreed with plan for rehab.  Patient's insurance not excepted by [facility], recommend referral to [facility]  Case management working on placement   -7/2 referral sent to SNF, awaiting insurance Auth   -7/3 SNF authorization denied by insurance.  Discussed with , they state denial was due to patient being able to ambulate 150 feet, noted in PT note from 7/1.  Notes from 7/2 indicate patient is requiring  "increased assistance with ambulation secondary to pain, reiterating need for postacute placement.  Asked case management to resubmit request, offered peer to peer if needed for insurance authorization.   -7/4 No beds at SNF. PT worked with patient, still recommending post-acute placement   -7/5 Accepted by insurance, awaiting bed.   -7/6 no updates from case management regarding bed availability  - Neurosurgery recommendations: No DVT PPx, TLSO brace when patient is up and walking.   - Fall precautions  - Regular diet  - MiraLAX BID, senna-docusate daily    Leukocytosis  Assessment & Plan  Developed a mild leukocytosis post op. Likely related to surgical procedure. Patient is otherwise asymptomatic.   - WBC trending to normal. Ok to stop trending 7/1    Poor nutrition- (present on admission)  Assessment & Plan  Nursing staff and wife report patient has poor PO intake. Patient reports he has \"moods and times when he does not want to eat\". He is amenable to trying nutrition shakes.   Plan:   - Ensure Vanilla shakes with each meal   -7/2 wife reports patient still has decreased p.o. intake.  May need further nutritional support after discharge  -7/4 wife reports PO intake is improving.    Urinary frequency  Assessment & Plan  Patient complaining of urinary frequency. Could be UTI, BPH, or overactive bladder (this is per patient).   Plan:   - UA 7/1 shows no signs of nicole infection.  Patient denying urinary symptoms.  Will hold off on treatment for now, continue to monitor  -No complaints as of 7/6.  Resolved.    Normocytic anemia  Assessment & Plan  Hgb 13.9 on admission. Post procedure stable at 11.0.   - Hgb stable at 10.9 7/1, ok to stop daily trending  - Weekly labs, 7/8 if still inpatient.    HLD (hyperlipidemia)- (present on admission)  Assessment & Plan  Lipid panel with LDL at 138 two months ago.   -Continue home atorvastatin 40 mg.     Primary hypertension- (present on admission)  Assessment & Plan  Continue " home amlodipine 5mg and metoprolol SR 25 mg daily.     Hypothyroidism- (present on admission)  Assessment & Plan  TSH at 2 months ago of 2.480.   -Continue home levothyroxine 100 mcg.          Core Measures:  Fluids: IV push only, no IV fluids   Lines: Peripheral IV for intravenous access  Abx: None  Diet: regular diet   PPX: pharmacologic prophylaxis contraindicated due to recent surgery, per neurosurgery recs    CODE Status: Full Code      Disposition  Patient is medically cleared pending SNF/rehab placement  for discharge.   Anticipate discharge to skilled nursing facility.  I have placed the appropriate orders for post-discharge needs.    I have personally seen and examined the patient at bedside. I discussed the plan of care with patient, , and  Ralph Kline M.d..

## 2024-07-08 NOTE — CARE PLAN
The patient is Stable - Low risk of patient condition declining or worsening    Shift Goals  Clinical Goals: Mobillity, Safety  Patient Goals: Comfort  Family Goals: Not presen t    Progress made toward(s) clinical / shift goals:    Problem: Pain - Standard  Goal: Alleviation of pain or a reduction in pain to the patient’s comfort goal  Outcome: Progressing  Note: Patient educated on pain scale and to notify RN of pain. Medicated per MAR and repositioned        Problem: Knowledge Deficit - Standard  Goal: Patient and family/care givers will demonstrate understanding of plan of care, disease process/condition, diagnostic tests and medications  Outcome: Progressing  Note: Plan of care discussed, verbalized understanding. Questions answered        Problem: Fall Risk  Goal: Patient will remain free from falls  Outcome: Progressing     Problem: Skin Integrity  Goal: Skin integrity is maintained or improved  Outcome: Progressing     Problem: Mobility  Goal: Patient's capacity to carry out activities will improve  Outcome: Progressing     Problem: Infection - Standard  Goal: Patient will remain free from infection  Outcome: Progressing     Problem: Wound/ / Incision Healing  Goal: Patient's wound/surgical incision will decrease in size and heals properly  Outcome: Progressing       Patient is not progressing towards the following goals:

## 2024-07-09 VITALS
HEIGHT: 64 IN | BODY MASS INDEX: 30.22 KG/M2 | DIASTOLIC BLOOD PRESSURE: 58 MMHG | HEART RATE: 74 BPM | SYSTOLIC BLOOD PRESSURE: 109 MMHG | RESPIRATION RATE: 16 BRPM | WEIGHT: 177 LBS | TEMPERATURE: 98.1 F | OXYGEN SATURATION: 97 %

## 2024-07-09 PROBLEM — E87.6 HYPOKALEMIA: Status: RESOLVED | Noted: 2024-04-26 | Resolved: 2024-07-09

## 2024-07-09 LAB
ANION GAP SERPL CALC-SCNC: 11 MMOL/L (ref 7–16)
BUN SERPL-MCNC: 20 MG/DL (ref 8–22)
CALCIUM SERPL-MCNC: 8.3 MG/DL (ref 8.5–10.5)
CHLORIDE SERPL-SCNC: 106 MMOL/L (ref 96–112)
CO2 SERPL-SCNC: 20 MMOL/L (ref 20–33)
CREAT SERPL-MCNC: 0.59 MG/DL (ref 0.5–1.4)
GFR SERPLBLD CREATININE-BSD FMLA CKD-EPI: 94 ML/MIN/1.73 M 2
GLUCOSE SERPL-MCNC: 85 MG/DL (ref 65–99)
POTASSIUM SERPL-SCNC: 4.4 MMOL/L (ref 3.6–5.5)
SODIUM SERPL-SCNC: 137 MMOL/L (ref 135–145)

## 2024-07-09 PROCEDURE — 700102 HCHG RX REV CODE 250 W/ 637 OVERRIDE(OP)

## 2024-07-09 PROCEDURE — 36415 COLL VENOUS BLD VENIPUNCTURE: CPT

## 2024-07-09 PROCEDURE — 80048 BASIC METABOLIC PNL TOTAL CA: CPT

## 2024-07-09 PROCEDURE — A9270 NON-COVERED ITEM OR SERVICE: HCPCS

## 2024-07-09 PROCEDURE — 99238 HOSP IP/OBS DSCHRG MGMT 30/<: CPT | Mod: GC | Performed by: FAMILY MEDICINE

## 2024-07-09 RX ORDER — TIZANIDINE 2 MG/1
2 TABLET ORAL 3 TIMES DAILY PRN
Status: SHIPPED
Start: 2024-07-09

## 2024-07-09 RX ORDER — OXYCODONE HYDROCHLORIDE 5 MG/1
5 TABLET ORAL EVERY 4 HOURS PRN
Status: SHIPPED
Start: 2024-07-09 | End: 2024-07-19

## 2024-07-09 RX ORDER — TIZANIDINE 2 MG/1
2 TABLET ORAL 3 TIMES DAILY PRN
Qty: 30 TABLET | Refills: 3 | Status: SHIPPED | OUTPATIENT
Start: 2024-07-09 | End: 2024-07-09

## 2024-07-09 RX ORDER — ACETAMINOPHEN 500 MG
1000 TABLET ORAL EVERY 6 HOURS PRN
Status: SHIPPED
Start: 2024-07-09

## 2024-07-09 RX ADMIN — METOPROLOL SUCCINATE 25 MG: 25 TABLET, EXTENDED RELEASE ORAL at 04:21

## 2024-07-09 RX ADMIN — AMLODIPINE BESYLATE 5 MG: 5 TABLET ORAL at 04:21

## 2024-07-09 RX ADMIN — LEVOTHYROXINE SODIUM 100 MCG: 0.1 TABLET ORAL at 04:21

## 2024-07-09 ASSESSMENT — PAIN DESCRIPTION - PAIN TYPE
TYPE: ACUTE PAIN

## 2024-07-09 NOTE — PROGRESS NOTES
"Attempted to call report to [facility].  RN \"busy\" at the moment.  Direct phone number left with unit clerk.  "

## 2024-07-09 NOTE — PROGRESS NOTES
"    FAMILY MEDICINE PROGRESS NOTE          PATIENT ID:  NAME:  Victoriano Vasquez  MRN:               [id number]  YOB: 1940    Date of Admission: 6/24/2024     Attending: Ralph Kline M.d.     Resident: Ramez Richardson M.D. (PGY-2)    Primary Care Physician:  Susi Gregorio M.D.    HPI: Victoriano Vasquez is a 84 y.o. male with past medical history of HLD, HTN, Hypothyroidism, was admitted on 6/24/2024 for L2 compression fracture, sent in by neurosurgery now s/p T12-L4 fusion. On hospital day 14.    Interval Problem Update  6/30: Seen by PM&R, agree with rehab placement. Pt insurance not accepted by [facility]. Recommend referrals to [facility]  7/1-7: Medically cleared, awaiting placement.    SUBJECTIVE:   No acute events overnight, no concerns. States his pain is \"perfect.\"    OBJECTIVE:  Temp:  [36.2 °C (97.2 °F)-36.9 °C (98.4 °F)] 36.9 °C (98.4 °F)  Pulse:  [67-78] 78  Resp:  [16-17] 17  BP: ()/(51-70) 95/52  SpO2:  [94 %-97 %] 94 %    No intake or output data in the 24 hours ending 07/08/24 1908      PHYSICAL EXAM:  Physical Exam  Vitals reviewed.   Constitutional:       General: He is not in acute distress.     Appearance: Normal appearance. He is not toxic-appearing.      Comments: Very pleasant elderly male lying in hospital bed. Interacts appropriately   HENT:      Head: Normocephalic and atraumatic.      Right Ear: External ear normal.      Left Ear: External ear normal.      Nose: Nose normal. No congestion.      Mouth/Throat:      Mouth: Mucous membranes are moist.      Pharynx: Oropharynx is clear.   Eyes:      Extraocular Movements: Extraocular movements intact.      Pupils: Pupils are equal, round, and reactive to light.   Cardiovascular:      Rate and Rhythm: Normal rate and regular rhythm.      Heart sounds: No murmur heard.  Pulmonary:      Effort: Pulmonary effort is normal. No respiratory distress.      Breath sounds: Normal breath sounds.   Abdominal:      General: Abdomen is flat. Bowel " "sounds are normal. There is no distension.      Palpations: Abdomen is soft.      Tenderness: There is no abdominal tenderness. There is no guarding or rebound.   Musculoskeletal:         General: No swelling or deformity.      Cervical back: Normal range of motion and neck supple.      Comments: Range of motion limited by pain.  Improved by lying still.   Skin:     General: Skin is warm and dry.      Capillary Refill: Capillary refill takes less than 2 seconds.      Findings: No rash.   Neurological:      General: No focal deficit present.      Mental Status: He is alert and oriented to person, place, and time.      Cranial Nerves: No cranial nerve deficit.   Psychiatric:         Mood and Affect: Mood normal.         Behavior: Behavior normal.           LABS:  Recent Labs     07/08/24  0108   WBC 11.0*   RBC 3.60*   HEMOGLOBIN 11.2*   HEMATOCRIT 32.8*   MCV 91.1   MCH 31.1   RDW 58.9*   PLATELETCT 404   MPV 9.9   NEUTSPOLYS 71.40   LYMPHOCYTES 17.90*   MONOCYTES 8.50   EOSINOPHILS 0.90   BASOPHILS 0.50       Recent Labs     07/08/24  0108   SODIUM 139   POTASSIUM 3.4*   CHLORIDE 105   CO2 22   BUN 21   CREATININE 0.58   CALCIUM 8.5       Estimated GFR/CRCL = Estimated Creatinine Clearance: 87.4 mL/min (by C-G formula based on SCr of 0.58 mg/dL).  Recent Labs     07/08/24  0108   GLUCOSE 91                     No results for input(s): \"INR\", \"APTT\", \"DDIMER\", \"FIBRINOGEN\" in the last 72 hours.      IMAGING:  DX-PORTABLE FLUORO > 1 HOUR   Final Result      Portable fluoroscopy utilized for 7.4 seconds.         INTERPRETING LOCATION: [address] [place] [address]      DX-THORACOLUMBAR SPINE-2 VIEWS   Final Result      Digitized intraoperative radiograph is submitted for review. This examination is not for diagnostic purpose but for guidance during a surgical procedure. Please see the patient's chart for full procedural details.         INTERPRETING LOCATION: [address] [place] [address], [id number]      DX-O-ARM   Final Result    "   Portable O-arm utilized for 3.23 seconds.      INTERPRETING LOCATION: [address] [place] [address], [place], [address]      DX-CHEST-PORTABLE (1 VIEW)   Final Result      1.  Hypoinflation without other evidence for acute cardiopulmonary disease.   2.  Evidence of old granulomatous disease.      CT-LSPINE W/O PLUS RECONS   Final Result         1. Burst fracture of L2 is again noted with severe canal stenosis.          CULTURES:   Results       ** No results found for the last 168 hours. **            MEDS:  Current Facility-Administered Medications   Medication Last Admin    acetaminophen (Tylenol) tablet 1,000 mg 1,000 mg at 07/06/24 1033    polyethylene glycol/lytes (Miralax) Packet 1 Packet 1 Packet at 07/08/24 1657    oxyCODONE immediate-release (Roxicodone) tablet 5 mg 5 mg at 07/05/24 0541    oxyCODONE immediate release (Roxicodone) tablet 10 mg      morphine 4 MG/ML injection 2 mg      Pharmacy Consult Request ...Pain Management Review 1 Each      MD ALERT...DO NOT ADMINISTER NSAIDS or ASPIRIN unless ORDERED By Neurosurgery 1 Each      senna-docusate (Pericolace Or Senokot S) 8.6-50 MG per tablet 1 Tablet 1 Tablet at 07/07/24 2117    senna-docusate (Pericolace Or Senokot S) 8.6-50 MG per tablet 1 Tablet 1 Tablet at 06/28/24 0900    polyethylene glycol/lytes (Miralax) Packet 1 Packet 1 Packet at 07/08/24 0515    magnesium hydroxide (Milk Of Magnesia) suspension 30 mL 30 mL at 07/04/24 0539    bisacodyl (Dulcolax) suppository 10 mg      sodium phosphate (Fleet) enema 133 mL      diphenhydrAMINE (Benadryl) tablet/capsule 25 mg      Or    diphenhydrAMINE (Benadryl) injection 25 mg      ondansetron (Zofran) syringe/vial injection 4 mg      ondansetron (Zofran ODT) dispertab 4 mg      tizanidine (Zanaflex) tablet 2 mg 2 mg at 07/06/24 1033    labetalol (Normodyne/Trandate) injection 10 mg      hydrALAZINE (Apresoline) injection 10 mg      lidocaine (Asperflex) 4 % patch 2 Patch 2 Patch at 07/08/24 1052    labetalol  "(Normodyne/Trandate) injection 10 mg      amLODIPine (Norvasc) tablet 5 mg 5 mg at 07/08/24 0515    atorvastatin (Lipitor) tablet 40 mg 40 mg at 07/08/24 1658    levothyroxine (Synthroid) tablet 100 mcg 100 mcg at 07/08/24 0515    metoprolol SR (Toprol XL) tablet 25 mg 25 mg at 07/08/24 0515       ASSESSMENT/PLAN:  84 y.o. male admitted for   * Lumbar compression fracture, closed, initial encounter ([facility])- (present on admission)  Assessment & Plan  S/p T12-L4 fusion on 6/27 by neurosurgery. PCA pump Dc'd 6/28, pt not in sig pain. Hemovac removed 6/29.   Plan:   -  Tylenol 1000 mg q6h prn, oxycodone 5-10 mg q4h PRN, IV morphine 2 mg q3 hours prn, Tizanidine 2 mg TID PRN  - PT consulted recommend acute placement, PM&R saw patient and agreed with plan for rehab.  Patient's insurance not excepted by [facility], recommend referral to [facility].  Case management working on placement   -7/8 CM reports SNF will have bed available 7/9  - Neurosurgery recommendations: No DVT PPx, TLSO brace when patient is up and walking.   - Fall precautions  - Regular diet  - MiraLAX BID, senna-docusate daily    Leukocytosis  Assessment & Plan  Developed a mild leukocytosis post op. Likely related to surgical procedure. Patient is otherwise asymptomatic.   - WBC trending to normal. Ok to stop trending 7/1  - 7/8 WBC 11.0, no need for further evaluation unless clinically changed    Poor nutrition- (present on admission)  Assessment & Plan  Nursing staff and wife report patient has poor PO intake. Patient reports he has \"moods and times when he does not want to eat\". He is amenable to trying nutrition shakes.   Plan:   - Ensure Vanilla shakes with each meal   -7/2 wife reports patient still has decreased p.o. intake.  May need further nutritional support after discharge  -7/4 wife reports PO intake is improving.    Normocytic anemia  Assessment & Plan  Hgb 13.9 on admission. Post procedure stable at 11.0.   - Hgb stable at 10.9 7/1, ok to stop daily " trending  - 11.2 on repeat 7/8    Hypokalemia- (present on admission)  Assessment & Plan  K 3.4 on 7/8  -Replete with oral K 20 mEq  -Repeat BMP 7/9    HLD (hyperlipidemia)- (present on admission)  Assessment & Plan  Lipid panel with LDL at 138 two months ago.   -Continue home atorvastatin 40 mg.     Primary hypertension- (present on admission)  Assessment & Plan  Continue home amlodipine 5mg and metoprolol SR 25 mg daily.     Hypothyroidism- (present on admission)  Assessment & Plan  TSH at 2 months ago of 2.480.   -Continue home levothyroxine 100 mcg.          Core Measures:  Fluids: IV push only, no IV fluids   Lines: Peripheral IV for intravenous access  Abx: None  Diet: regular diet   PPX: pharmacologic prophylaxis contraindicated due to recent surgery, per neurosurgery recs    CODE Status: Full Code      Disposition  Patient is medically cleared pending SNF/rehab placement  for discharge.   Anticipate discharge to skilled nursing facility on 7/9.  I have placed the appropriate orders for post-discharge needs.    I have personally seen and examined the patient at bedside. I discussed the plan of care with patient and  Ralph Kline M.d..      Ramez Richardson M.D.   PGY-2  [facility]

## 2024-07-09 NOTE — DISCHARGE PLANNING
0809  Agency/Facility Name: [facility]  Outcome: DPA called to confirm 1200 transport via their transportation today. Left VM with name and callback number.     0900  Agency/Facility Name: [facility]  Spoke To: Viry   Outcome: DPA called to confirm 1200 transport via their transportation today. They have confirmed transport.

## 2024-07-09 NOTE — CARE PLAN
The patient is Stable - Low risk of patient condition declining or worsening    Problem: Pain - Standard  Goal: Alleviation of pain or a reduction in pain to the patient’s comfort goal  Outcome: Progressing    Working with patient to manage pain following surgery. Patient verbalized understanding of education and interventions.   Interventions:  - medication   - ambulation   - rest   - repositioning   - distraction   - ice packs     Problem: Fall Risk  Goal: Patient will remain free from falls  Outcome: Progressing    Educated patient on fall precautions while in the hospital. Patient verbalized understanding of education and interventions used.   Interventions:  - bed alarm in use  - bed in lowest and locked position   - non skid socks on   - call light, water and urinal within reach  - use of correct DME and put away after    Shift Goals  Clinical Goals: pain control, safety  Patient Goals: rest, discharge tomorrow  Family Goals: na

## 2024-07-09 NOTE — DISCHARGE PLANNING
Case Management Discharge Planning    Admission Date: 6/24/2024  GMLOS: 2.8  ALOS: 15    6-Clicks ADL Score: 15  6-Clicks Mobility Score: 19  PT and/or OT Eval ordered: Yes  Post-acute Referrals Ordered: Yes  Post-acute Choice Obtained: Yes  Has referral(s) been sent to post-acute provider:  Yes      Anticipated Discharge Dispo: Discharge Disposition: D/T to SNF with Medicare cert in anticipation of skilled care (03)    DME Needed: No    Action(s) Taken: DPA notified LMSW that [facility] can accept pt today at 1200 via their private transportation. LMSW met with pt at bedside to discuss transfer, pt is agreeable and signed transfer form. LMSW provided pt and pt's spouse with 2nd IMM notice, faxed form to DPA. LMSW requested DC order and summary from provider. LMSW completed COBRA packet and handed to charge RN.    Escalations Completed: None    Medically Clear: Yes    Next Steps: LMSW to follow for any additional CM needs.     Barriers to Discharge: None    Is the patient up for discharge tomorrow: No, today.

## 2024-08-06 ENCOUNTER — HOME HEALTH ADMISSION (OUTPATIENT)
Dept: HOME HEALTH SERVICES | Facility: HOME HEALTHCARE | Age: 84
End: 2024-08-06
Payer: MEDICARE

## 2024-10-22 ENCOUNTER — TELEPHONE (OUTPATIENT)
Dept: CARDIOLOGY | Facility: MEDICAL CENTER | Age: 84
End: 2024-10-22
Payer: MEDICARE

## (undated) DEVICE — ELECTRODE DUAL RETURN W/ CORD - (50/PK)

## (undated) DEVICE — GLOVE BIOGEL INDICATOR SZ 7SURGICAL PF LTX - (50/BX 4BX/CA)

## (undated) DEVICE — MIDAS LUBRICATOR DIFFUSER PACK (4EA/CA)

## (undated) DEVICE — DRAPE PATIENT STERILE FOR USE WITH O OR C ARMS (10EA/BX)

## (undated) DEVICE — GLOVE BIOGEL SZ 6.5 SURGICAL PF LTX (50PR/BX 4BX/CA)

## (undated) DEVICE — DRAPE LARGE 3 QUARTER - (20/CA)

## (undated) DEVICE — SENSOR OXIMETER ADULT SPO2 RD SET (20EA/BX)

## (undated) DEVICE — APPLICATOR ENDOSCOPIC SURGICEL (5EA/BX)

## (undated) DEVICE — BONE MILL BM210

## (undated) DEVICE — COVER LIGHT HANDLE ALC PLUS DISP (18EA/BX)

## (undated) DEVICE — SLEEVE, VASO, THIGH, MED

## (undated) DEVICE — CANISTER SUCTION 3000ML MECHANICAL FILTER AUTO SHUTOFF MEDI-VAC NONSTERILE LF DISP  (40EA/CA)

## (undated) DEVICE — RESERVOIR SUCTION 100 CC - SILICONE (20EA/CA)

## (undated) DEVICE — SPONGE GAUZESTER. 2X2 4-PL - (2/PK 50PK/BX 30BX/CS)

## (undated) DEVICE — SUTURE 0 VICRYL PLUS CT-2 - 8 X 18 INCH (12/BX)

## (undated) DEVICE — GLOVE BIOGEL SZ 7.5 SURGICAL PF LTX - (50PR/BX 4BX/CA)

## (undated) DEVICE — HEMOSTAT ABSORBABLE POWDER SURGICEL 3G (5EA/BX)

## (undated) DEVICE — SODIUM CHL IRRIGATION 0.9% 1000ML (12EA/CA)

## (undated) DEVICE — SUCTION INSTRUMENT YANKAUER BULBOUS TIP W/O VENT (50EA/CA)

## (undated) DEVICE — TROCAR Z THREAD 11 X 100 - BLADED (6/BX)

## (undated) DEVICE — INTRAOP NEURO IN OR 1:1 PER 15 MIN

## (undated) DEVICE — SUTURE 0 COATED VICRYL 6-18IN - (12PK/BX)

## (undated) DEVICE — SUTURE 4-0 VICRYL PLUS FS-2 - 27 INCH (36/BX)

## (undated) DEVICE — HEADREST PRONEVIEW LARGE - (10/CA)

## (undated) DEVICE — SODIUM CHL. INJ. 0.9% 500ML (24EA/CA 50CA/PF)

## (undated) DEVICE — SUTURE 1 VICRYL PLUS CTX - 8 X 18 INCH (12/BX)

## (undated) DEVICE — SHEET PEDIATRIC LAPAROTOMY - (10/CA)

## (undated) DEVICE — DRAPE 36X28IN RAD CARM BND BG - (25/CA) O

## (undated) DEVICE — KIT SURGIFLO W/OUT THROMBIN - (6EA/CA)

## (undated) DEVICE — DRAPE LAPAROTOMY T SHEET - (12EA/CA)

## (undated) DEVICE — SET LEADWIRE 5 LEAD BEDSIDE DISPOSABLE ECG (1SET OF 5/EA)

## (undated) DEVICE — GLOVE BIOGEL SZ 8.5 SURGICAL PF LTX - (50PR/BX 4BX/CA)

## (undated) DEVICE — CLIP MED LG INTNL HRZN TI ESCP - (20/BX)

## (undated) DEVICE — SYRINGE NON SAFETY 10 CC 20 GA X 1-1/2 IN (100/BX 4BX/CA)

## (undated) DEVICE — TROCAR 5X100 BLADED Z-THREAD - KII (6/BX)

## (undated) DEVICE — CHLORAPREP 26 ML APPLICATOR - ORANGE TINT(25/CA)

## (undated) DEVICE — SLEEVE VASO CALF MED - (10PR/CA)

## (undated) DEVICE — BAG SPONGE COUNT 10.25 X 32 - BLUE (250/CA)

## (undated) DEVICE — SOD. CHL. INJ. 0.9% 1000 ML - (14EA/CA 60CA/PF)

## (undated) DEVICE — TOOL MR8 14CM MATCH HD SYM-TRI 3MM DIAMETER (1/EA)

## (undated) DEVICE — CANNULA W/SEAL 5X100 Z-THRE - ADED KII (12/BX)

## (undated) DEVICE — BOVIE BLADE COATED &INSULATED - 25/PK

## (undated) DEVICE — CORDS BIPOLAR COAGULATION - 12FT STERILE DISP. (10EA/BX)

## (undated) DEVICE — SET SUCTION/IRRIGATION WITH DISPOSABLE TIP (6/CA )PART #0250-070-520 IS A SUB

## (undated) DEVICE — DRAPE STRLE REG TOWEL 18X24 - (10/BX 4BX/CA)"

## (undated) DEVICE — PACK LAP CHOLE OR - (2EA/CA)

## (undated) DEVICE — TUBING CLEARLINK DUO-VENT - C-FLO (48EA/CA)

## (undated) DEVICE — GOWN SURGICAL XX-LARGE - (28EA/CA) SIRUS NON REINFORCED

## (undated) DEVICE — ARMREST CRADLE FOAM - (2PR/PK 12PR/CA)

## (undated) DEVICE — DRAIN J-VAC 10MM FLAT - (10/CA)

## (undated) DEVICE — PACK JACKSON TABLE KIT W/OUT - HR (6EA/CA)

## (undated) DEVICE — SUTURE GENERAL

## (undated) DEVICE — LACTATED RINGERS INJ 1000 ML - (14EA/CA 60CA/PF)

## (undated) DEVICE — GLOVE SZ 7.5 BIOGEL PI MICRO - PF LF (50PR/BX)

## (undated) DEVICE — SPHERE NAVIGATION STEALTH (5EA/TY 12TY/PK)

## (undated) DEVICE — GLOVE BIOGEL SZ 7 SURGICAL PF LTX - (50PR/BX 4BX/CA)

## (undated) DEVICE — PACK NEURO - (2EA/CA)

## (undated) DEVICE — CLOSURE SKIN STRIP 1/2 X 4 IN - (STERI STRIP) (50/BX 4BX/CA)

## (undated) DEVICE — GLOVE BIOGEL INDICATOR SZ 6.5 SURGICAL PF LTX - (50PR/BX 4BX/CA)

## (undated) DEVICE — SUTURE 3-0 ETHILON PS-1 (36PK/BX)

## (undated) DEVICE — FORCEPS ELECTROSURGICAL DISPOSABLE CODMAN 8IN 1.5MM

## (undated) DEVICE — STOPCOCK MALE 4-WAY - (50/CA)

## (undated) DEVICE — SET EXTENSION WITH 2 PORTS (48EA/CA) ***PART #2C8610 IS A SUBSTITUTE*****

## (undated) DEVICE — KIT EVACUATER 3 SPRING PVC LF 1/8 DRAIN SIZE (10EA/CA)"

## (undated) DEVICE — GOWN WARMING STANDARD FLEX - (30/CA)

## (undated) DEVICE — COVER MAYO STAND X-LG - (22EA/CA)

## (undated) DEVICE — BAG RETRIEVAL 10ML (10EA/BX)

## (undated) DEVICE — TRAY SURESTEP FOLEY TEMP SENSING 16FR SNAP SECURE(10EA/CA) ORDER  #18764 FOR TEMP FOLEY ONLY

## (undated) DEVICE — DRESSING TRANSPARENT FILM TEGADERM 2.375 X 2.75"  (100EA/BX)"

## (undated) DEVICE — SEALER BIPOLAR 2.3 AQUAMANTYS

## (undated) DEVICE — CATHETER REDDICK ----MUST ORDER IN MULITPLES OF 10----

## (undated) DEVICE — ADHESIVE MASTISOL - (48/BX)

## (undated) DEVICE — SUTURE 2-0 VICRYL PLUS CT-2 - 27 INCH (36/BX)